# Patient Record
Sex: MALE | Race: BLACK OR AFRICAN AMERICAN | NOT HISPANIC OR LATINO | URBAN - METROPOLITAN AREA
[De-identification: names, ages, dates, MRNs, and addresses within clinical notes are randomized per-mention and may not be internally consistent; named-entity substitution may affect disease eponyms.]

---

## 2019-04-11 ENCOUNTER — INPATIENT (INPATIENT)
Facility: HOSPITAL | Age: 64
LOS: 11 days | Discharge: HOME | End: 2019-04-23
Attending: INTERNAL MEDICINE | Admitting: INTERNAL MEDICINE
Payer: MEDICARE

## 2019-04-11 VITALS
HEIGHT: 76 IN | HEART RATE: 110 BPM | SYSTOLIC BLOOD PRESSURE: 110 MMHG | WEIGHT: 169.98 LBS | TEMPERATURE: 98 F | OXYGEN SATURATION: 99 % | RESPIRATION RATE: 18 BRPM | DIASTOLIC BLOOD PRESSURE: 76 MMHG

## 2019-04-11 DIAGNOSIS — E11.9 TYPE 2 DIABETES MELLITUS WITHOUT COMPLICATIONS: ICD-10-CM

## 2019-04-11 DIAGNOSIS — M86.172 OTHER ACUTE OSTEOMYELITIS, LEFT ANKLE AND FOOT: ICD-10-CM

## 2019-04-11 LAB
ALBUMIN SERPL ELPH-MCNC: 2.8 G/DL — LOW (ref 3.3–5)
ALLERGY+IMMUNOLOGY DIAG STUDY NOTE: SIGNIFICANT CHANGE UP
ALP SERPL-CCNC: 99 U/L — SIGNIFICANT CHANGE UP (ref 40–120)
ALT FLD-CCNC: 12 U/L — SIGNIFICANT CHANGE UP (ref 12–78)
ANION GAP SERPL CALC-SCNC: 10 MMOL/L — SIGNIFICANT CHANGE UP (ref 5–17)
APTT BLD: 28.9 SEC — SIGNIFICANT CHANGE UP (ref 27.5–36.3)
AST SERPL-CCNC: 9 U/L — LOW (ref 15–37)
BASOPHILS # BLD AUTO: 0.02 K/UL — SIGNIFICANT CHANGE UP (ref 0–0.2)
BASOPHILS NFR BLD AUTO: 0.2 % — SIGNIFICANT CHANGE UP (ref 0–2)
BILIRUB SERPL-MCNC: 0.3 MG/DL — SIGNIFICANT CHANGE UP (ref 0.2–1.2)
BLD GP AB SCN SERPL QL: ABNORMAL
BUN SERPL-MCNC: 18 MG/DL — SIGNIFICANT CHANGE UP (ref 7–23)
CALCIUM SERPL-MCNC: 9.7 MG/DL — SIGNIFICANT CHANGE UP (ref 8.5–10.1)
CHLORIDE SERPL-SCNC: 101 MMOL/L — SIGNIFICANT CHANGE UP (ref 96–108)
CO2 SERPL-SCNC: 26 MMOL/L — SIGNIFICANT CHANGE UP (ref 22–31)
CREAT SERPL-MCNC: 1.05 MG/DL — SIGNIFICANT CHANGE UP (ref 0.5–1.3)
DIR ANTIGLOB POLYSPECIFIC INTERPRETATION: SIGNIFICANT CHANGE UP
EOSINOPHIL # BLD AUTO: 0.18 K/UL — SIGNIFICANT CHANGE UP (ref 0–0.5)
EOSINOPHIL NFR BLD AUTO: 1.8 % — SIGNIFICANT CHANGE UP (ref 0–6)
ERYTHROCYTE [SEDIMENTATION RATE] IN BLOOD: 100 MM/HR — HIGH (ref 0–20)
GLUCOSE SERPL-MCNC: 397 MG/DL — HIGH (ref 70–99)
HCT VFR BLD CALC: 34.6 % — LOW (ref 39–50)
HGB BLD-MCNC: 11.5 G/DL — LOW (ref 13–17)
IMM GRANULOCYTES NFR BLD AUTO: 0.3 % — SIGNIFICANT CHANGE UP (ref 0–1.5)
INR BLD: 0.98 RATIO — SIGNIFICANT CHANGE UP (ref 0.88–1.16)
LACTATE SERPL-SCNC: 1.2 MMOL/L — SIGNIFICANT CHANGE UP (ref 0.7–2)
LYMPHOCYTES # BLD AUTO: 1.81 K/UL — SIGNIFICANT CHANGE UP (ref 1–3.3)
LYMPHOCYTES # BLD AUTO: 18.6 % — SIGNIFICANT CHANGE UP (ref 13–44)
MCHC RBC-ENTMCNC: 31.2 PG — SIGNIFICANT CHANGE UP (ref 27–34)
MCHC RBC-ENTMCNC: 33.2 GM/DL — SIGNIFICANT CHANGE UP (ref 32–36)
MCV RBC AUTO: 93.8 FL — SIGNIFICANT CHANGE UP (ref 80–100)
MONOCYTES # BLD AUTO: 1.03 K/UL — HIGH (ref 0–0.9)
MONOCYTES NFR BLD AUTO: 10.6 % — SIGNIFICANT CHANGE UP (ref 2–14)
NEUTROPHILS # BLD AUTO: 6.67 K/UL — SIGNIFICANT CHANGE UP (ref 1.8–7.4)
NEUTROPHILS NFR BLD AUTO: 68.5 % — SIGNIFICANT CHANGE UP (ref 43–77)
NRBC # BLD: 0 /100 WBCS — SIGNIFICANT CHANGE UP (ref 0–0)
PLATELET # BLD AUTO: 220 K/UL — SIGNIFICANT CHANGE UP (ref 150–400)
POTASSIUM SERPL-MCNC: 4.4 MMOL/L — SIGNIFICANT CHANGE UP (ref 3.5–5.3)
POTASSIUM SERPL-SCNC: 4.4 MMOL/L — SIGNIFICANT CHANGE UP (ref 3.5–5.3)
PROT SERPL-MCNC: 8.3 GM/DL — SIGNIFICANT CHANGE UP (ref 6–8.3)
PROTHROM AB SERPL-ACNC: 11 SEC — SIGNIFICANT CHANGE UP (ref 10–12.9)
RBC # BLD: 3.69 M/UL — LOW (ref 4.2–5.8)
RBC # FLD: 13.2 % — SIGNIFICANT CHANGE UP (ref 10.3–14.5)
SODIUM SERPL-SCNC: 137 MMOL/L — SIGNIFICANT CHANGE UP (ref 135–145)
WBC # BLD: 9.74 K/UL — SIGNIFICANT CHANGE UP (ref 3.8–10.5)
WBC # FLD AUTO: 9.74 K/UL — SIGNIFICANT CHANGE UP (ref 3.8–10.5)

## 2019-04-11 PROCEDURE — 86077 PHYS BLOOD BANK SERV XMATCH: CPT

## 2019-04-11 PROCEDURE — 88304 TISSUE EXAM BY PATHOLOGIST: CPT | Mod: 26

## 2019-04-11 PROCEDURE — 99285 EMERGENCY DEPT VISIT HI MDM: CPT

## 2019-04-11 PROCEDURE — 99223 1ST HOSP IP/OBS HIGH 75: CPT

## 2019-04-11 PROCEDURE — 88309 TISSUE EXAM BY PATHOLOGIST: CPT | Mod: 26

## 2019-04-11 PROCEDURE — 73630 X-RAY EXAM OF FOOT: CPT | Mod: 26,LT

## 2019-04-11 PROCEDURE — 88311 DECALCIFY TISSUE: CPT | Mod: 26

## 2019-04-11 RX ORDER — PIPERACILLIN AND TAZOBACTAM 4; .5 G/20ML; G/20ML
3.38 INJECTION, POWDER, LYOPHILIZED, FOR SOLUTION INTRAVENOUS ONCE
Qty: 0 | Refills: 0 | Status: COMPLETED | OUTPATIENT
Start: 2019-04-11 | End: 2019-04-11

## 2019-04-11 RX ORDER — DEXTROSE 50 % IN WATER 50 %
25 SYRINGE (ML) INTRAVENOUS ONCE
Qty: 0 | Refills: 0 | Status: DISCONTINUED | OUTPATIENT
Start: 2019-04-11 | End: 2019-04-16

## 2019-04-11 RX ORDER — GABAPENTIN 400 MG/1
100 CAPSULE ORAL ONCE
Qty: 0 | Refills: 0 | Status: COMPLETED | OUTPATIENT
Start: 2019-04-11 | End: 2019-04-11

## 2019-04-11 RX ORDER — DEXTROSE 50 % IN WATER 50 %
12.5 SYRINGE (ML) INTRAVENOUS ONCE
Qty: 0 | Refills: 0 | Status: DISCONTINUED | OUTPATIENT
Start: 2019-04-11 | End: 2019-04-16

## 2019-04-11 RX ORDER — ACETAMINOPHEN 500 MG
650 TABLET ORAL EVERY 6 HOURS
Qty: 0 | Refills: 0 | Status: DISCONTINUED | OUTPATIENT
Start: 2019-04-11 | End: 2019-04-16

## 2019-04-11 RX ORDER — INSULIN GLARGINE 100 [IU]/ML
8 INJECTION, SOLUTION SUBCUTANEOUS AT BEDTIME
Qty: 0 | Refills: 0 | Status: DISCONTINUED | OUTPATIENT
Start: 2019-04-11 | End: 2019-04-12

## 2019-04-11 RX ORDER — SODIUM CHLORIDE 9 MG/ML
1000 INJECTION, SOLUTION INTRAVENOUS
Qty: 0 | Refills: 0 | Status: DISCONTINUED | OUTPATIENT
Start: 2019-04-11 | End: 2019-04-16

## 2019-04-11 RX ORDER — INSULIN LISPRO 100/ML
VIAL (ML) SUBCUTANEOUS
Qty: 0 | Refills: 0 | Status: DISCONTINUED | OUTPATIENT
Start: 2019-04-11 | End: 2019-04-16

## 2019-04-11 RX ORDER — GLUCAGON INJECTION, SOLUTION 0.5 MG/.1ML
1 INJECTION, SOLUTION SUBCUTANEOUS ONCE
Qty: 0 | Refills: 0 | Status: DISCONTINUED | OUTPATIENT
Start: 2019-04-11 | End: 2019-04-16

## 2019-04-11 RX ORDER — VANCOMYCIN HCL 1 G
1000 VIAL (EA) INTRAVENOUS ONCE
Qty: 0 | Refills: 0 | Status: COMPLETED | OUTPATIENT
Start: 2019-04-11 | End: 2019-04-11

## 2019-04-11 RX ORDER — INSULIN LISPRO 100/ML
VIAL (ML) SUBCUTANEOUS AT BEDTIME
Qty: 0 | Refills: 0 | Status: DISCONTINUED | OUTPATIENT
Start: 2019-04-11 | End: 2019-04-16

## 2019-04-11 RX ORDER — VANCOMYCIN HCL 1 G
1000 VIAL (EA) INTRAVENOUS EVERY 12 HOURS
Qty: 0 | Refills: 0 | Status: DISCONTINUED | OUTPATIENT
Start: 2019-04-12 | End: 2019-04-13

## 2019-04-11 RX ORDER — INSULIN HUMAN 100 [IU]/ML
8 INJECTION, SOLUTION SUBCUTANEOUS ONCE
Qty: 0 | Refills: 0 | Status: COMPLETED | OUTPATIENT
Start: 2019-04-11 | End: 2019-04-11

## 2019-04-11 RX ORDER — PIPERACILLIN AND TAZOBACTAM 4; .5 G/20ML; G/20ML
3.38 INJECTION, POWDER, LYOPHILIZED, FOR SOLUTION INTRAVENOUS EVERY 8 HOURS
Qty: 0 | Refills: 0 | Status: DISCONTINUED | OUTPATIENT
Start: 2019-04-11 | End: 2019-04-16

## 2019-04-11 RX ORDER — DEXTROSE 50 % IN WATER 50 %
15 SYRINGE (ML) INTRAVENOUS ONCE
Qty: 0 | Refills: 0 | Status: DISCONTINUED | OUTPATIENT
Start: 2019-04-11 | End: 2019-04-16

## 2019-04-11 RX ADMIN — PIPERACILLIN AND TAZOBACTAM 200 GRAM(S): 4; .5 INJECTION, POWDER, LYOPHILIZED, FOR SOLUTION INTRAVENOUS at 16:50

## 2019-04-11 RX ADMIN — INSULIN HUMAN 8 UNIT(S): 100 INJECTION, SOLUTION SUBCUTANEOUS at 23:05

## 2019-04-11 RX ADMIN — INSULIN HUMAN 8 UNIT(S): 100 INJECTION, SOLUTION SUBCUTANEOUS at 16:50

## 2019-04-11 RX ADMIN — Medication 8: at 23:04

## 2019-04-11 RX ADMIN — INSULIN GLARGINE 8 UNIT(S): 100 INJECTION, SOLUTION SUBCUTANEOUS at 23:04

## 2019-04-11 RX ADMIN — GABAPENTIN 100 MILLIGRAM(S): 400 CAPSULE ORAL at 23:04

## 2019-04-11 RX ADMIN — Medication 250 MILLIGRAM(S): at 17:48

## 2019-04-11 RX ADMIN — PIPERACILLIN AND TAZOBACTAM 3.38 GRAM(S): 4; .5 INJECTION, POWDER, LYOPHILIZED, FOR SOLUTION INTRAVENOUS at 17:50

## 2019-04-11 NOTE — ED PROVIDER NOTE - OBJECTIVE STATEMENT
64yoM; with pmh signif for DM, HTN, chronic left foot wound; now p/w worsening left foot wound with increasing drainage. sent to ED by podiatry for admission for osteomyelitis.  c/o chills. denies fever. denies n/v/d.   PMH; DM, HTN, chronic foot wound

## 2019-04-11 NOTE — ED PROVIDER NOTE - PHYSICAL EXAMINATION
General:     NAD, well-nourished, well-appearing  Head:     NC/AT, EOMI, oral mucosa moist  Neck:     trachea midline  Lungs:     CTA b/l, no w/r/r  CVS:     S1S2, RRR, no m/g/r  Abd:     +BS, s/nt/nd, no organomegaly  Ext:    2+ radial and pedal pulses, L FOOT:  per podiatry exam:  Wound #1: plantar 5th met head  Size: 2.3 x 3.2 cm   Neuro: AAOx3, no sensory/motor deficits

## 2019-04-11 NOTE — H&P ADULT - ASSESSMENT
64m with foot infection       IMPROVE VTE Individual Risk Assessment    RISK                                                          Points  [] Previous VTE                                           3  [] Thrombophilia                                        2  [] Lower limb paralysis                              2   [] Current Cancer                                       2   [] Immobilization > 24 hrs                        1  [] ICU/CCU stay > 24 hours                       1  [] Age > 60                                                   1    IMPROVE VTE Score:2

## 2019-04-11 NOTE — ED PROVIDER NOTE - CLINICAL SUMMARY MEDICAL DECISION MAKING FREE TEXT BOX
patient arrived c/o left foot wound, worsening, podiatry recommended admission for iv abx and OR tomorrow.

## 2019-04-11 NOTE — H&P ADULT - NSHPREVIEWOFSYSTEMS_GEN_ALL_CORE

## 2019-04-11 NOTE — ED PROVIDER NOTE - NS ED ROS FT
Constitutional: (-) fever  (-)chills  (-)sweats  Eyes/ENT: (-) blurry vision, (-) epistaxis  (-)rhinorrhea   (-) sore throat    Cardiovascular: (-) chest pain, (-) palpitations (-) edema   Respiratory: (-) cough, (-) shortness of breath   Gastrointestinal: (-)nausea  (-)vomiting, (-) diarrhea  (-) abdominal pain   :  (-)dysuria, (-)frequency, (-)urgency, (-)hematuria  Musculoskeletal: (-) neck pain, (-) back pain, (-) joint pain  Integumentary: (+)foot wound  Neurological: (-) headache, (-) altered mental status  (-)LOC

## 2019-04-11 NOTE — ED ADULT NURSE NOTE - OBJECTIVE STATEMENT
pt came in for foot pain. pt states he  came to ED after being seen by his podiatrist  pt came in for foot pain. left foot with diabetic ulcer and some gangrene.  pt states he  came to ED after being seen by his podiatrist

## 2019-04-11 NOTE — ED ADULT TRIAGE NOTE - HEIGHT IN CM
For information on Fall & Injury Prevention, visit [email or web address] [phone]

## 2019-04-11 NOTE — H&P ADULT - HISTORY OF PRESENT ILLNESS
HPI: 63 yo M w/ PMHx  of diabetes presents to the ED for multiple issues.  Describes pain in his left foot and leg which caused him to fall.  Now having pain in his lower back, fell 3 times since yesterday.  Has swelling, blistering and sores of the left foot which he says has been progressing over the last 8 days.  Pt states he saw a podiatrist today who sent him to the hospital.  Pt denies NVFC or SOB. 65 yo M w/ PMHx  of diabetes presents to the ED for multiple issues.  Describes pain in his left foot and leg which caused him to fall.  Now having pain in his lower back, fell 3 times since yesterday.  Has swelling, blistering and sores of the left foot which he says has been progressing over the last 8 days.  Pt states he saw a podiatrist today who sent him to the hospital.  Pt denies NVFC or SOB.

## 2019-04-11 NOTE — H&P ADULT - NSHPPHYSICALEXAM_GEN_ALL_CORE
ICU Vital Signs Last 24 Hrs  T(C): 36.7 (11 Apr 2019 11:30), Max: 36.7 (11 Apr 2019 11:30)  T(F): 98 (11 Apr 2019 11:30), Max: 98 (11 Apr 2019 11:30)  HR: 87 (11 Apr 2019 15:05) (87 - 110)  BP: 116/65 (11 Apr 2019 15:05) (110/76 - 116/65)  BP(mean): --  ABP: --  ABP(mean): --  RR: 18 (11 Apr 2019 15:05) (18 - 18)  SpO2: 100% (11 Apr 2019 15:05) (99% - 100%)  GENERAL: NAD well-developed  HEAD:  Atraumatic, Normocephalic  EYES: EOMI, PERRLA, conjunctiva and sclera clear  ENMT: No tonsillar erythema, exudates, or enlargement; Moist mucous membranes, Good dentition, No lesions  NECK: Supple, No JVD, Normal thyroid  NERVOUS SYSTEM:  Alert & Oriented X3, Good concentration; Motor Strength 5/5 B/L upper and lower extremities; DTRs 2+ intact and symmetric  CHEST/LUNG: Clear to percussion bilaterally; No rales, rhonchi, wheezing, or rubs  HEART: Regular rate and rhythm; No murmurs, rubs, or gallops  ABDOMEN: Soft, Nontender, Nondistended; Bowel sounds present  EXTREMITIES:  2+ Peripheral Pulses, No clubbing, cyanosis, or edema  LYMPH: No lymphadenopathy   SKIN: No rashes or lesions ICU Vital Signs Last 24 Hrs  T(C): 36.7 (11 Apr 2019 11:30), Max: 36.7 (11 Apr 2019 11:30)  T(F): 98 (11 Apr 2019 11:30), Max: 98 (11 Apr 2019 11:30)  HR: 87 (11 Apr 2019 15:05) (87 - 110)  BP: 116/65 (11 Apr 2019 15:05) (110/76 - 116/65)  BP(mean): --  ABP: --  ABP(mean): --  RR: 18 (11 Apr 2019 15:05) (18 - 18)  SpO2: 100% (11 Apr 2019 15:05) (99% - 100%)  GENERAL: NAD well-developed  HEAD:  Atraumatic, Normocephalic  EYES: EOMI, PERRLA, conjunctiva and sclera clear  ENMT: No tonsillar erythema, exudates, or enlargement; Moist mucous membranes, Good dentition, No lesions  NECK: Supple, No JVD, Normal thyroid  NERVOUS SYSTEM:  Alert & Oriented X3, Good concentration; Motor Strength 5/5 B/L upper and lower extremities; DTRs 2+ intact and symmetric  CHEST/LUNG: Clear to percussion bilaterally; No rales, rhonchi, wheezing, or rubs  HEART: Regular rate and rhythm; No murmurs, rubs, or gallops  ABDOMEN: Soft, Nontender, Nondistended; Bowel sounds present  EXTREMITIES:  2+ Peripheral Pulses, No clubbing, cyanosis, or edema POSITIVE left foot baldomero left boot   LYMPH: No lymphadenopathy   SKIN: No rashes or lesions

## 2019-04-12 DIAGNOSIS — E11.628 TYPE 2 DIABETES MELLITUS WITH OTHER SKIN COMPLICATIONS: ICD-10-CM

## 2019-04-12 LAB
ANION GAP SERPL CALC-SCNC: 8 MMOL/L — SIGNIFICANT CHANGE UP (ref 5–17)
BUN SERPL-MCNC: 17 MG/DL — SIGNIFICANT CHANGE UP (ref 7–23)
CALCIUM SERPL-MCNC: 8.9 MG/DL — SIGNIFICANT CHANGE UP (ref 8.5–10.1)
CHLORIDE SERPL-SCNC: 103 MMOL/L — SIGNIFICANT CHANGE UP (ref 96–108)
CO2 SERPL-SCNC: 26 MMOL/L — SIGNIFICANT CHANGE UP (ref 22–31)
CREAT SERPL-MCNC: 0.86 MG/DL — SIGNIFICANT CHANGE UP (ref 0.5–1.3)
CRP SERPL-MCNC: 14.28 MG/DL — HIGH (ref 0–0.4)
GLUCOSE SERPL-MCNC: 308 MG/DL — HIGH (ref 70–99)
HBA1C BLD-MCNC: >15.5 % — HIGH (ref 4–5.6)
HCT VFR BLD CALC: 32.5 % — LOW (ref 39–50)
HCV AB S/CO SERPL IA: 0.07 S/CO — SIGNIFICANT CHANGE UP (ref 0–0.99)
HCV AB SERPL-IMP: SIGNIFICANT CHANGE UP
HGB BLD-MCNC: 10.4 G/DL — LOW (ref 13–17)
MCHC RBC-ENTMCNC: 30.2 PG — SIGNIFICANT CHANGE UP (ref 27–34)
MCHC RBC-ENTMCNC: 32 GM/DL — SIGNIFICANT CHANGE UP (ref 32–36)
MCV RBC AUTO: 94.5 FL — SIGNIFICANT CHANGE UP (ref 80–100)
NRBC # BLD: 0 /100 WBCS — SIGNIFICANT CHANGE UP (ref 0–0)
PLATELET # BLD AUTO: 205 K/UL — SIGNIFICANT CHANGE UP (ref 150–400)
POTASSIUM SERPL-MCNC: 3.8 MMOL/L — SIGNIFICANT CHANGE UP (ref 3.5–5.3)
POTASSIUM SERPL-SCNC: 3.8 MMOL/L — SIGNIFICANT CHANGE UP (ref 3.5–5.3)
RBC # BLD: 3.44 M/UL — LOW (ref 4.2–5.8)
RBC # FLD: 13.2 % — SIGNIFICANT CHANGE UP (ref 10.3–14.5)
SODIUM SERPL-SCNC: 137 MMOL/L — SIGNIFICANT CHANGE UP (ref 135–145)
WBC # BLD: 6.68 K/UL — SIGNIFICANT CHANGE UP (ref 3.8–10.5)
WBC # FLD AUTO: 6.68 K/UL — SIGNIFICANT CHANGE UP (ref 3.8–10.5)

## 2019-04-12 PROCEDURE — 99223 1ST HOSP IP/OBS HIGH 75: CPT

## 2019-04-12 PROCEDURE — 99232 SBSQ HOSP IP/OBS MODERATE 35: CPT

## 2019-04-12 RX ORDER — INSULIN GLARGINE 100 [IU]/ML
10 INJECTION, SOLUTION SUBCUTANEOUS
Qty: 0 | Refills: 0 | Status: DISCONTINUED | OUTPATIENT
Start: 2019-04-12 | End: 2019-04-12

## 2019-04-12 RX ORDER — INSULIN GLARGINE 100 [IU]/ML
20 INJECTION, SOLUTION SUBCUTANEOUS
Qty: 0 | Refills: 0 | Status: DISCONTINUED | OUTPATIENT
Start: 2019-04-12 | End: 2019-04-13

## 2019-04-12 RX ADMIN — Medication 6: at 22:35

## 2019-04-12 RX ADMIN — PIPERACILLIN AND TAZOBACTAM 25 GRAM(S): 4; .5 INJECTION, POWDER, LYOPHILIZED, FOR SOLUTION INTRAVENOUS at 16:18

## 2019-04-12 RX ADMIN — INSULIN GLARGINE 20 UNIT(S): 100 INJECTION, SOLUTION SUBCUTANEOUS at 22:34

## 2019-04-12 RX ADMIN — PIPERACILLIN AND TAZOBACTAM 25 GRAM(S): 4; .5 INJECTION, POWDER, LYOPHILIZED, FOR SOLUTION INTRAVENOUS at 08:38

## 2019-04-12 RX ADMIN — Medication 6: at 16:18

## 2019-04-12 RX ADMIN — Medication 250 MILLIGRAM(S): at 06:05

## 2019-04-12 RX ADMIN — Medication 250 MILLIGRAM(S): at 18:56

## 2019-04-12 RX ADMIN — PIPERACILLIN AND TAZOBACTAM 25 GRAM(S): 4; .5 INJECTION, POWDER, LYOPHILIZED, FOR SOLUTION INTRAVENOUS at 01:14

## 2019-04-12 RX ADMIN — Medication 4: at 13:00

## 2019-04-12 RX ADMIN — Medication 8: at 07:51

## 2019-04-12 NOTE — PROGRESS NOTE ADULT - ASSESSMENT
65 yo M w/ PMHx of diabetes presents to the ED complaint of left foot pain.  Describes pain in his left foot and leg which caused him to fall.  Initially complained of back pain in his lower back, but now feels better.  Has swelling, blistering and sores on the left foot which he says has been progressing over the last few months.  Pt states he saw a podiatrist who sent him to the hospital.  Denies fever or chills.  He does admit to stopping his insulin a few months ago and has not been on any diabetic medications since.    Left foot pain, likely Osteomyelitis  New 4 mm linear radiopaque foreign body in the soft tissue between the   second and third proximal phalanges in the plantar aspect  Podiatry following  ID consult pending  On IV Vanco and Zosyn  follow culture results  local wound care    IDDM  Increase Lantus to 20 units BID (patient reports taking Solostar 30 units BID at home)  SSI coverage  diabetic diet  Check HgbA1C

## 2019-04-12 NOTE — PROGRESS NOTE ADULT - ASSESSMENT
65 yo DM w/ left foot abscess, and high suspicion for OM  - 3 cc of purulent drainage expressed  - Wound flushed and cultured  - Flushed again with copious saline  - Wound plantarly and dorsally w/ aquacel Ag  - MRI ordered to r/o OM  - added on for partial 5th ray amp on monday after 2 pm  - f/u ID recs  - D/w attending

## 2019-04-12 NOTE — CONSULT NOTE ADULT - SUBJECTIVE AND OBJECTIVE BOX
Patient is a 64y old  Male who presents with a chief complaint of left foot infection    HPI: 65 yo M w/ PMHx  of diabetes presents to the ED for multiple issues.  Describes pain in his left foot and leg which caused him to fall.  Now having pain in his lower back, fell 3 times since yesterday.  Has swelling, blistering and sores of the left foot which he says has been progressing over the last 8 days.  Pt states he saw a podiatrist today who sent him to the hospital.  Pt denies NVFC or SOB.      PAST MEDICAL & SURGICAL HISTORY:  Diabetes  No significant past surgical history      MEDICATIONS  (STANDING):  insulin regular  human recombinant. 8 Unit(s) SubCutaneous once  piperacillin/tazobactam IVPB. 3.375 Gram(s) IV Intermittent once  vancomycin  IVPB 1000 milliGRAM(s) IV Intermittent once    MEDICATIONS  (PRN):      Allergies    bananas (Rash)  Cauliflower (Other)  No Known Drug Allergies    Intolerances        VITALS:    Vital Signs Last 24 Hrs  T(C): 36.7 (11 Apr 2019 11:30), Max: 36.7 (11 Apr 2019 11:30)  T(F): 98 (11 Apr 2019 11:30), Max: 98 (11 Apr 2019 11:30)  HR: 87 (11 Apr 2019 15:05) (87 - 110)  BP: 116/65 (11 Apr 2019 15:05) (110/76 - 116/65)  BP(mean): --  RR: 18 (11 Apr 2019 15:05) (18 - 18)  SpO2: 100% (11 Apr 2019 15:05) (99% - 100%)    LABS:                          11.5   9.74  )-----------( 220      ( 11 Apr 2019 15:40 )             34.6       04-11    137  |  101  |  18  ----------------------------<  397<H>  4.4   |  26  |  1.05    Ca    9.7      11 Apr 2019 15:40    TPro  8.3  /  Alb  2.8<L>  /  TBili  0.3  /  DBili  x   /  AST  9<L>  /  ALT  12  /  AlkPhos  99  04-11      CAPILLARY BLOOD GLUCOSE      POCT Blood Glucose.: 371 mg/dL (11 Apr 2019 16:24)  POCT Blood Glucose.: 357 mg/dL (11 Apr 2019 16:22)      PT/INR - ( 11 Apr 2019 15:40 )   PT: 11.0 sec;   INR: 0.98 ratio         PTT - ( 11 Apr 2019 15:40 )  PTT:28.9 sec    LOWER EXTREMITY PHYSICAL EXAM:    Vascular: DP/PT 2/4, B/L, CFT <3 seconds B/L, Temperature gradient warm,, B/L. (L dorsolateral foot warmer)  Neuro: Epicritic sensation diminished to b/l feet  Musculoskeletal/Ortho: no gross deformtiies  Skin:  Wound #1: plantar 5th met head  Size: 2.3 x 3.2 cm   Depth: 1.8 cm (to the dorsal foot) also probes to bone  Wound bed: fibrotic and liquefactive  Drainage: purulent  Odor: yes  Periwound: hyperkeratotic  Etiology: pressure    RADIOLOGY & ADDITIONAL STUDIES:  < from: Xray Foot AP + Lateral + Oblique, Left (04.11.19 @ 14:51) >  EXAM:  FOOT COMPLETE  3 VWS   LT                            PROCEDURE DATE:  04/11/2019          INTERPRETATION:  Left foot x-rays    Indication: Left foot wound.    3 views of the left foot are compared to previous admission dated   7/6/2016.    Impression: No evidence for an acute fracture or dislocation.    The joint spaces are preserved.    The osseous mineralization is within normal limits.    Stable calcaneal spur.    New 4 mm linear radiopaque foreign body in the soft tissue between the   second and third proximal phalanges in the plantar aspect. This finding   is communicated with the emergency department via the PACS communication   system.    < end of copied text >
Infectious Diseases - Attending at Dr. Chan    HPI:  63 yo M w/ PMHx  of diabetes presents to the ED for multiple issues.  Describes pain in his left foot and leg which caused him to fall.  Now having pain in his lower back, fell 3 times since yesterday.  Has swelling, blistering and sores of the left foot which he says has been progressing over the last 8 days.  Pt states he saw a podiatrist today who sent him to the hospital.  Pt denies NVFC or SOB. (11 Apr 2019 17:44)      PAST MEDICAL & SURGICAL HISTORY:  Diabetes  No significant past surgical history      Allergies    bananas (Rash)  Cauliflower (Other)  No Known Drug Allergies    Intolerances        FAMILY HISTORY:  No pertinent family history in first degree relatives      SOCIAL HISTORY: smoker    REVIEW OF SYSTEMS:    Constitutional: No fever, weight loss or fatigue  Eyes: No eye pain, visual disturbances, or discharge  ENT:  No difficulty hearing, tinnitus, vertigo; No sinus or throat pain  Neck: No pain or stiffness  Respiratory: No cough, wheezing, chills or hemoptysis  Cardiovascular: No chest pain, palpitations, shortness of breath, dizziness or leg swelling  Gastrointestinal: No abdominal or epigastric pain. No nausea, vomiting or hematemesis; No diarrhea or constipation. No melena or hematochezia.  Genitourinary: No dysuria, frequency, hematuria or incontinence  Neurological: No headaches, memory loss, loss of strength, numbness or tremors  Skin: No itching, burning, rashes or lesions       MEDICATIONS  (STANDING):  dextrose 5%. 1000 milliLiter(s) (50 mL/Hr) IV Continuous <Continuous>  dextrose 50% Injectable 12.5 Gram(s) IV Push once  dextrose 50% Injectable 25 Gram(s) IV Push once  dextrose 50% Injectable 25 Gram(s) IV Push once  insulin glargine Injectable (LANTUS) 20 Unit(s) SubCutaneous two times a day  insulin lispro (HumaLOG) corrective regimen sliding scale   SubCutaneous three times a day before meals  insulin lispro (HumaLOG) corrective regimen sliding scale   SubCutaneous at bedtime  piperacillin/tazobactam IVPB. 3.375 Gram(s) IV Intermittent every 8 hours  vancomycin  IVPB 1000 milliGRAM(s) IV Intermittent every 12 hours    MEDICATIONS  (PRN):  acetaminophen   Tablet .. 650 milliGRAM(s) Oral every 6 hours PRN Mild Pain (1 - 3)  dextrose 40% Gel 15 Gram(s) Oral once PRN Blood Glucose LESS THAN 70 milliGRAM(s)/deciliter  glucagon  Injectable 1 milliGRAM(s) IntraMuscular once PRN Glucose LESS THAN 70 milligrams/deciliter      Vital Signs Last 24 Hrs  T(C): 36.7 (12 Apr 2019 12:09), Max: 36.7 (12 Apr 2019 00:06)  T(F): 98 (12 Apr 2019 12:09), Max: 98.1 (12 Apr 2019 00:06)  HR: 65 (12 Apr 2019 12:09) (65 - 94)  BP: 101/53 (12 Apr 2019 12:09) (97/52 - 108/64)  BP(mean): --  RR: 16 (12 Apr 2019 12:09) (16 - 17)  SpO2: 100% (12 Apr 2019 12:09) (98% - 100%)    PHYSICAL EXAM:    Constitutional: NAD, thin built  HEENT: PERRLA, EOMI, Normal Hearing, MMM  Neck: No LAD, No JVD  Back: Normal spine flexure, No CVA tenderness  Respiratory: CTAB/L  Cardiovascular: S1 and S2, RRR, no M/G/R  Gastrointestinal: BS+, soft, NT/ND  Extremities: No peripheral edema  Vascular: 2+ peripheral pulses  Neurological: A/O x 3, no focal deficits  Skin: left foot wound near fifth metatarsal   foot swelling      LABS:                        10.4   6.68  )-----------( 205      ( 12 Apr 2019 07:58 )             32.5     04-12    137  |  103  |  17  ----------------------------<  308<H>  3.8   |  26  |  0.86    Ca    8.9      12 Apr 2019 07:58    TPro  8.3  /  Alb  2.8<L>  /  TBili  0.3  /  DBili  x   /  AST  9<L>  /  ALT  12  /  AlkPhos  99  04-11    PT/INR - ( 11 Apr 2019 15:40 )   PT: 11.0 sec;   INR: 0.98 ratio         PTT - ( 11 Apr 2019 15:40 )  PTT:28.9 sec      MICROBIOLOGY:  RECENT CULTURES:        RADIOLOGY & ADDITIONAL STUDIES:    INTERPRETATION:  Left foot x-rays    Indication: Left foot wound.    3 views of the left foot are compared to previous admission dated   7/6/2016.    Impression: No evidence for an acute fracture or dislocation.    The joint spaces are preserved.    The osseous mineralization is within normal limits.    Stable calcaneal spur.    New 4 mm linear radiopaque foreign body in the soft tissue between the   second and third proximal phalanges in the plantar aspect. This finding   is communicated with the emergency department via the PACS communication   system.

## 2019-04-12 NOTE — CONSULT NOTE ADULT - ASSESSMENT
63 yo DM w/ left foot abscess, and high suspicion for OM  - #15 blade used to make a dorsal incision over 5th metatarsal shaft, incision communicates to the plantar wound  - 5 cc of purulent drainage expressed  - Wound flushed and cultured  - Flushed again with copious saline  - Wound packed dorsally  - XR finding of new foreign body does not correlate clinically  - Rec IV vanco, zosyn  - Rec admit for IV abx and workup for osteomyelitis  - ESR and CRP ordered  - D/w attending
64 yr old male seen with

## 2019-04-13 LAB
ANION GAP SERPL CALC-SCNC: 8 MMOL/L — SIGNIFICANT CHANGE UP (ref 5–17)
BUN SERPL-MCNC: 25 MG/DL — HIGH (ref 7–23)
CALCIUM SERPL-MCNC: 9.4 MG/DL — SIGNIFICANT CHANGE UP (ref 8.5–10.1)
CHLORIDE SERPL-SCNC: 104 MMOL/L — SIGNIFICANT CHANGE UP (ref 96–108)
CO2 SERPL-SCNC: 26 MMOL/L — SIGNIFICANT CHANGE UP (ref 22–31)
CREAT SERPL-MCNC: 1.01 MG/DL — SIGNIFICANT CHANGE UP (ref 0.5–1.3)
GLUCOSE BLDC GLUCOMTR-MCNC: 215 MG/DL — HIGH (ref 70–99)
GLUCOSE BLDC GLUCOMTR-MCNC: 245 MG/DL — HIGH (ref 70–99)
GLUCOSE BLDC GLUCOMTR-MCNC: 316 MG/DL — HIGH (ref 70–99)
GLUCOSE SERPL-MCNC: 328 MG/DL — HIGH (ref 70–99)
HCT VFR BLD CALC: 35.4 % — LOW (ref 39–50)
HGB BLD-MCNC: 11.2 G/DL — LOW (ref 13–17)
MCHC RBC-ENTMCNC: 30.2 PG — SIGNIFICANT CHANGE UP (ref 27–34)
MCHC RBC-ENTMCNC: 31.6 GM/DL — LOW (ref 32–36)
MCV RBC AUTO: 95.4 FL — SIGNIFICANT CHANGE UP (ref 80–100)
NRBC # BLD: 0 /100 WBCS — SIGNIFICANT CHANGE UP (ref 0–0)
PLATELET # BLD AUTO: 232 K/UL — SIGNIFICANT CHANGE UP (ref 150–400)
POTASSIUM SERPL-MCNC: 4.2 MMOL/L — SIGNIFICANT CHANGE UP (ref 3.5–5.3)
POTASSIUM SERPL-SCNC: 4.2 MMOL/L — SIGNIFICANT CHANGE UP (ref 3.5–5.3)
RBC # BLD: 3.71 M/UL — LOW (ref 4.2–5.8)
RBC # FLD: 13.2 % — SIGNIFICANT CHANGE UP (ref 10.3–14.5)
SODIUM SERPL-SCNC: 138 MMOL/L — SIGNIFICANT CHANGE UP (ref 135–145)
VANCOMYCIN TROUGH SERPL-MCNC: 8 UG/ML — LOW (ref 10–20)
WBC # BLD: 6.1 K/UL — SIGNIFICANT CHANGE UP (ref 3.8–10.5)
WBC # FLD AUTO: 6.1 K/UL — SIGNIFICANT CHANGE UP (ref 3.8–10.5)

## 2019-04-13 PROCEDURE — 93923 UPR/LXTR ART STDY 3+ LVLS: CPT | Mod: 26

## 2019-04-13 PROCEDURE — 99233 SBSQ HOSP IP/OBS HIGH 50: CPT

## 2019-04-13 RX ORDER — INSULIN GLARGINE 100 [IU]/ML
30 INJECTION, SOLUTION SUBCUTANEOUS
Qty: 0 | Refills: 0 | Status: DISCONTINUED | OUTPATIENT
Start: 2019-04-13 | End: 2019-04-16

## 2019-04-13 RX ORDER — VANCOMYCIN HCL 1 G
1000 VIAL (EA) INTRAVENOUS EVERY 8 HOURS
Qty: 0 | Refills: 0 | Status: DISCONTINUED | OUTPATIENT
Start: 2019-04-13 | End: 2019-04-16

## 2019-04-13 RX ORDER — INSULIN LISPRO 100/ML
5 VIAL (ML) SUBCUTANEOUS
Qty: 0 | Refills: 0 | Status: DISCONTINUED | OUTPATIENT
Start: 2019-04-13 | End: 2019-04-16

## 2019-04-13 RX ADMIN — PIPERACILLIN AND TAZOBACTAM 25 GRAM(S): 4; .5 INJECTION, POWDER, LYOPHILIZED, FOR SOLUTION INTRAVENOUS at 09:00

## 2019-04-13 RX ADMIN — Medication 5 UNIT(S): at 12:25

## 2019-04-13 RX ADMIN — INSULIN GLARGINE 20 UNIT(S): 100 INJECTION, SOLUTION SUBCUTANEOUS at 07:48

## 2019-04-13 RX ADMIN — Medication 4: at 21:37

## 2019-04-13 RX ADMIN — Medication 4: at 16:24

## 2019-04-13 RX ADMIN — Medication 250 MILLIGRAM(S): at 21:36

## 2019-04-13 RX ADMIN — Medication 250 MILLIGRAM(S): at 07:10

## 2019-04-13 RX ADMIN — PIPERACILLIN AND TAZOBACTAM 25 GRAM(S): 4; .5 INJECTION, POWDER, LYOPHILIZED, FOR SOLUTION INTRAVENOUS at 18:04

## 2019-04-13 RX ADMIN — Medication 4: at 12:24

## 2019-04-13 RX ADMIN — Medication 10: at 07:45

## 2019-04-13 RX ADMIN — PIPERACILLIN AND TAZOBACTAM 25 GRAM(S): 4; .5 INJECTION, POWDER, LYOPHILIZED, FOR SOLUTION INTRAVENOUS at 01:43

## 2019-04-13 RX ADMIN — INSULIN GLARGINE 30 UNIT(S): 100 INJECTION, SOLUTION SUBCUTANEOUS at 21:37

## 2019-04-13 RX ADMIN — Medication 5 UNIT(S): at 16:24

## 2019-04-13 NOTE — PROGRESS NOTE ADULT - ASSESSMENT
63 yo DM w/ left foot abscess, and high suspicion for OM  - No purulence drained today  - Flushed again with copious saline  - Wound packed plantarly  - added on for partial 5th ray amp on monday after 2 pm  - Awaiting MRI final read for final pod plan  - f/u ID recs  - seen with attending

## 2019-04-13 NOTE — PROGRESS NOTE ADULT - ASSESSMENT
65 yo M w/ PMHx of diabetes presents to the ED complaint of left foot pain.  Describes pain in his left foot and leg which caused him to fall.  Initially complained of back pain in his lower back, but now feels better.  Has swelling, blistering and sores on the left foot which he says has been progressing over the last few months.  Pt states he saw a podiatrist who sent him to the hospital.  Denies fever or chills.  He does admit to stopping his insulin a few months ago and has not been on any diabetic medications since.    Left foot pain, likely Osteomyelitis  New 4 mm linear radiopaque foreign body in the soft tissue between the   second and third proximal phalanges in the plantar aspect  Podiatry following. plans for partial 5 ray amputation on monday   ID consult appreciated   On IV Vanco and Zosyn  vanco trough low and will change to 1g q8  follow culture results  local wound care    IDDM  Increase Lantus to 30 units BID (patient reports taking Solostar 30 units BID at home) also take 15 units of premeal   add 5 u premeal covergae   SSI coverage  diabetic diet   HgbA1C >15

## 2019-04-14 LAB
ANION GAP SERPL CALC-SCNC: 8 MMOL/L — SIGNIFICANT CHANGE UP (ref 5–17)
BUN SERPL-MCNC: 25 MG/DL — HIGH (ref 7–23)
CALCIUM SERPL-MCNC: 9.2 MG/DL — SIGNIFICANT CHANGE UP (ref 8.5–10.1)
CHLORIDE SERPL-SCNC: 107 MMOL/L — SIGNIFICANT CHANGE UP (ref 96–108)
CO2 SERPL-SCNC: 25 MMOL/L — SIGNIFICANT CHANGE UP (ref 22–31)
CREAT SERPL-MCNC: 0.89 MG/DL — SIGNIFICANT CHANGE UP (ref 0.5–1.3)
GLUCOSE BLDC GLUCOMTR-MCNC: 141 MG/DL — HIGH (ref 70–99)
GLUCOSE BLDC GLUCOMTR-MCNC: 212 MG/DL — HIGH (ref 70–99)
GLUCOSE BLDC GLUCOMTR-MCNC: 263 MG/DL — HIGH (ref 70–99)
GLUCOSE BLDC GLUCOMTR-MCNC: 274 MG/DL — HIGH (ref 70–99)
GLUCOSE SERPL-MCNC: 215 MG/DL — HIGH (ref 70–99)
HCT VFR BLD CALC: 34.3 % — LOW (ref 39–50)
HGB BLD-MCNC: 10.8 G/DL — LOW (ref 13–17)
MCHC RBC-ENTMCNC: 30.1 PG — SIGNIFICANT CHANGE UP (ref 27–34)
MCHC RBC-ENTMCNC: 31.5 GM/DL — LOW (ref 32–36)
MCV RBC AUTO: 95.5 FL — SIGNIFICANT CHANGE UP (ref 80–100)
NRBC # BLD: 0 /100 WBCS — SIGNIFICANT CHANGE UP (ref 0–0)
PLATELET # BLD AUTO: 246 K/UL — SIGNIFICANT CHANGE UP (ref 150–400)
POTASSIUM SERPL-MCNC: 4.1 MMOL/L — SIGNIFICANT CHANGE UP (ref 3.5–5.3)
POTASSIUM SERPL-SCNC: 4.1 MMOL/L — SIGNIFICANT CHANGE UP (ref 3.5–5.3)
RBC # BLD: 3.59 M/UL — LOW (ref 4.2–5.8)
RBC # FLD: 13.2 % — SIGNIFICANT CHANGE UP (ref 10.3–14.5)
SODIUM SERPL-SCNC: 140 MMOL/L — SIGNIFICANT CHANGE UP (ref 135–145)
VANCOMYCIN TROUGH SERPL-MCNC: 15.6 UG/ML — SIGNIFICANT CHANGE UP (ref 10–20)
WBC # BLD: 5.39 K/UL — SIGNIFICANT CHANGE UP (ref 3.8–10.5)
WBC # FLD AUTO: 5.39 K/UL — SIGNIFICANT CHANGE UP (ref 3.8–10.5)

## 2019-04-14 PROCEDURE — 99233 SBSQ HOSP IP/OBS HIGH 50: CPT

## 2019-04-14 RX ADMIN — PIPERACILLIN AND TAZOBACTAM 25 GRAM(S): 4; .5 INJECTION, POWDER, LYOPHILIZED, FOR SOLUTION INTRAVENOUS at 17:28

## 2019-04-14 RX ADMIN — Medication 6: at 07:47

## 2019-04-14 RX ADMIN — PIPERACILLIN AND TAZOBACTAM 25 GRAM(S): 4; .5 INJECTION, POWDER, LYOPHILIZED, FOR SOLUTION INTRAVENOUS at 02:28

## 2019-04-14 RX ADMIN — Medication 250 MILLIGRAM(S): at 22:41

## 2019-04-14 RX ADMIN — Medication 6: at 16:11

## 2019-04-14 RX ADMIN — Medication 5 UNIT(S): at 16:12

## 2019-04-14 RX ADMIN — INSULIN GLARGINE 30 UNIT(S): 100 INJECTION, SOLUTION SUBCUTANEOUS at 23:00

## 2019-04-14 RX ADMIN — Medication 250 MILLIGRAM(S): at 06:22

## 2019-04-14 RX ADMIN — Medication 5 UNIT(S): at 11:14

## 2019-04-14 RX ADMIN — Medication 5 UNIT(S): at 07:47

## 2019-04-14 RX ADMIN — Medication 250 MILLIGRAM(S): at 13:49

## 2019-04-14 RX ADMIN — PIPERACILLIN AND TAZOBACTAM 25 GRAM(S): 4; .5 INJECTION, POWDER, LYOPHILIZED, FOR SOLUTION INTRAVENOUS at 09:59

## 2019-04-14 RX ADMIN — INSULIN GLARGINE 30 UNIT(S): 100 INJECTION, SOLUTION SUBCUTANEOUS at 07:49

## 2019-04-14 NOTE — PROGRESS NOTE ADULT - ASSESSMENT
63 yo M w/ PMHx of diabetes presents to the ED complaint of left foot pain.  Describes pain in his left foot and leg which caused him to fall.  Initially complained of back pain in his lower back, but now feels better.  Has swelling, blistering and sores on the left foot which he says has been progressing over the last few months.  Pt states he saw a podiatrist who sent him to the hospital.  Denies fever or chills.  He does admit to stopping his insulin a few months ago and has not been on any diabetic medications since.    Left foot pain, likely Osteomyelitis  New 4 mm linear radiopaque foreign body in the soft tissue between the   second and third proximal phalanges in the plantar aspect  Could not get MRI d/t foreign body   Podiatry following. plans for partial 5 ray amputation on monday   ID consult appreciated   On IV Vanco and Zosyn  vanco trough low and changed to 1g q8  follow culture results  local wound care    IDDM  Increase Lantus to 30 units BID (patient reports taking Solostar 30 units BID at home) also take 15 units of premeal   add 5 u premeal covergae   slowly improving   SSI coverage  needs follow up with endo on dc   diabetic diet   HgbA1C >15

## 2019-04-14 NOTE — PROGRESS NOTE ADULT - ASSESSMENT
63 yo DM w/ left foot abscess, and high suspicion for OM  - No purulence drained today  - Flushed again with copious saline  - Wound packed plantarly  - Pt unable to get MRI 2/2 foreign body in 2nd toe  - Clinical and lab results all indicative of osteomyelitis of 5th ray  - Pt scheduled for 8:15 am tomorrow morning, partial 5th ray resection  - NPO after midnight

## 2019-04-15 ENCOUNTER — TRANSCRIPTION ENCOUNTER (OUTPATIENT)
Age: 64
End: 2019-04-15

## 2019-04-15 LAB — GLUCOSE BLDC GLUCOMTR-MCNC: 199 MG/DL — HIGH (ref 70–99)

## 2019-04-15 PROCEDURE — 99232 SBSQ HOSP IP/OBS MODERATE 35: CPT

## 2019-04-15 PROCEDURE — 99233 SBSQ HOSP IP/OBS HIGH 50: CPT

## 2019-04-15 RX ORDER — SODIUM CHLORIDE 9 MG/ML
1000 INJECTION, SOLUTION INTRAVENOUS
Qty: 0 | Refills: 0 | Status: DISCONTINUED | OUTPATIENT
Start: 2019-04-15 | End: 2019-04-15

## 2019-04-15 RX ORDER — LACTOBACILLUS ACIDOPHILUS 100MM CELL
1 CAPSULE ORAL THREE TIMES A DAY
Qty: 0 | Refills: 0 | Status: DISCONTINUED | OUTPATIENT
Start: 2019-04-15 | End: 2019-04-16

## 2019-04-15 RX ORDER — SODIUM CHLORIDE 9 MG/ML
1000 INJECTION INTRAMUSCULAR; INTRAVENOUS; SUBCUTANEOUS
Qty: 0 | Refills: 0 | Status: DISCONTINUED | OUTPATIENT
Start: 2019-04-15 | End: 2019-04-16

## 2019-04-15 RX ORDER — INSULIN GLARGINE 100 [IU]/ML
15 INJECTION, SOLUTION SUBCUTANEOUS ONCE
Qty: 0 | Refills: 0 | Status: COMPLETED | OUTPATIENT
Start: 2019-04-15 | End: 2019-04-15

## 2019-04-15 RX ORDER — SODIUM CHLORIDE 9 MG/ML
1000 INJECTION, SOLUTION INTRAVENOUS
Qty: 0 | Refills: 0 | Status: DISCONTINUED | OUTPATIENT
Start: 2019-04-16 | End: 2019-04-16

## 2019-04-15 RX ADMIN — Medication 1 TABLET(S): at 21:50

## 2019-04-15 RX ADMIN — INSULIN GLARGINE 30 UNIT(S): 100 INJECTION, SOLUTION SUBCUTANEOUS at 21:51

## 2019-04-15 RX ADMIN — Medication 2: at 21:49

## 2019-04-15 RX ADMIN — PIPERACILLIN AND TAZOBACTAM 25 GRAM(S): 4; .5 INJECTION, POWDER, LYOPHILIZED, FOR SOLUTION INTRAVENOUS at 08:21

## 2019-04-15 RX ADMIN — Medication 5 UNIT(S): at 15:49

## 2019-04-15 RX ADMIN — Medication 2: at 08:19

## 2019-04-15 RX ADMIN — PIPERACILLIN AND TAZOBACTAM 25 GRAM(S): 4; .5 INJECTION, POWDER, LYOPHILIZED, FOR SOLUTION INTRAVENOUS at 01:38

## 2019-04-15 RX ADMIN — PIPERACILLIN AND TAZOBACTAM 25 GRAM(S): 4; .5 INJECTION, POWDER, LYOPHILIZED, FOR SOLUTION INTRAVENOUS at 18:09

## 2019-04-15 RX ADMIN — Medication 2: at 15:48

## 2019-04-15 RX ADMIN — Medication 250 MILLIGRAM(S): at 05:56

## 2019-04-15 RX ADMIN — Medication 250 MILLIGRAM(S): at 21:50

## 2019-04-15 RX ADMIN — Medication 5 UNIT(S): at 11:05

## 2019-04-15 RX ADMIN — INSULIN GLARGINE 15 UNIT(S): 100 INJECTION, SOLUTION SUBCUTANEOUS at 08:58

## 2019-04-15 RX ADMIN — Medication 250 MILLIGRAM(S): at 14:33

## 2019-04-15 NOTE — PROGRESS NOTE ADULT - ASSESSMENT
63 yo DM w/ left foot abscess, and high suspicion for OM  - Pt seen and evaluated  - No purulence drained today  - Flushed again with copious saline  - Wound packed plantarly  - Pt unable to get MRI 2/2 foreign body in 2nd toe  - Clinical and lab results all indicative of osteomyelitis of 5th ray  - Pt rescheduled for Thursday at 12:15 pm for partial 5th ray resection   - d/w attending

## 2019-04-15 NOTE — DIETITIAN INITIAL EVALUATION ADULT. - NS AS NUTRI INTERV ED CONTENT
Survival information/Recommended modifications/Purpose of the nutrition education/Nutrition relationship to health/disease/Provided pt with DM MNT literature/education

## 2019-04-15 NOTE — DIETITIAN INITIAL EVALUATION ADULT. - PHYSICAL APPEARANCE
BMI: 17.7; Edema 1+ R foot, edema 2+ L ankle and L foot; Nutrition focused physical exam conducted; Subcutaneous fat loss: [severe] Orbital fat pads region, [severe]Buccal fat region, [moderate]Triceps region,  [unable]Ribs region.  Muscle wasting: [severe]Temples region, [severe]Clavicle region, [moderate]Shoulder region, [unable]Scapula region, [severe]Interosseous region,  [moderate]thigh region, [moderate]Calf region/underweight

## 2019-04-15 NOTE — DIETITIAN INITIAL EVALUATION ADULT. - OTHER INFO
Pt seen for BMI < 18 and A1c > 15.5%. Pt reported good appetite, po intake 100% during hospitalization. Pt reported he did not follow nutrition-related recommendations for DM pta but intends to implement dietary changes once he is discharged to prevent amputations in the future. Pt reported he likes rice crispies with skim milk, barbecue chicken with potatoes, asparagus, green beans. Pt reported he and his brother do food shopping/cooking. Pt denied difficulty chewing/swallowing, denied n/v/d/c. Pt reported last BM yesterday (4/14). Pt confirmed allergy to bananas and cauliflower.

## 2019-04-15 NOTE — CHART NOTE - NSCHARTNOTEFT_GEN_A_CORE
Upon Nutritional Assessment by the Registered Dietitian your patient was determined to meet criteria / has evidence of the following diagnosis/diagnoses:          [ ]  Mild Protein Calorie Malnutrition        [ ]  Moderate Protein Calorie Malnutrition        [x] Severe Protein Calorie Malnutrition        [ ] Unspecified Protein Calorie Malnutrition        [x] Underweight / BMI <19        [ ] Morbid Obesity / BMI > 40      Findings as based on:  •  Comprehensive nutrition assessment and consultation  •  Calorie counts (nutrient intake analysis)  •  Food acceptance and intake status from observations by staff  •  Follow up  •  Patient education  •  Intervention secondary to interdisciplinary rounds  •   concerns      Treatment:    The following diet has been recommended:  Consistent Carbohydrate (no snacks) + Glucerna 3x daily (660 kcals & 30 gm protein)    PROVIDER Section:     By signing this assessment you are acknowledging and agree with the diagnosis/diagnoses assigned by the Registered Dietitian    Comments:

## 2019-04-15 NOTE — PROGRESS NOTE ADULT - ASSESSMENT
65 yo M w/ PMHx of diabetes presents to the ED complaint of left foot pain.  Describes pain in his left foot and leg which caused him to fall.  Initially complained of back pain in his lower back, but now feels better.  Has swelling, blistering and sores on the left foot which he says has been progressing over the last few months.  Pt states he saw a podiatrist who sent him to the hospital.  Denies fever or chills.  He does admit to stopping his insulin a few months ago and has not been on any diabetic medications since.    Left foot pain, likely Osteomyelitis  New 4 mm linear radiopaque foreign body in the soft tissue between the   second and third proximal phalanges in the plantar aspect  Could not get MRI d/t foreign body   Podiatry following - plan for partial toe amputation later today  ID following  On IV Vanco and Zosyn  vanco trough low and changed to 1g q8  local wound care    IDDM  Increase Lantus to 30 units BID (patient reports taking Solostar 30 units BID at home) also take 15 units of premeal   add 5 u premeal covergae   slowly improving   SSI coverage  needs follow up with endo on dc   diabetic diet  HgbA1C >15 65 yo M w/ PMHx of diabetes presents to the ED complaint of left foot pain.  Describes pain in his left foot and leg which caused him to fall.  Initially complained of back pain in his lower back, but now feels better.  Has swelling, blistering and sores on the left foot which he says has been progressing over the last few months.  Pt states he saw a podiatrist who sent him to the hospital.  Denies fever or chills.  He does admit to stopping his insulin a few months ago and has not been on any diabetic medications since.    Left foot pain, likely Osteomyelitis  New 4 mm linear radiopaque foreign body in the soft tissue between the   second and third proximal phalanges in the plantar aspect  Could not get MRI d/t foreign body   Podiatry following - plan for partial toe amputation tomorrow, NPO after MN  ID following  On IV Vanco and Zosyn  vanco trough low and changed to 1g q8  local wound care    IDDM  Increase Lantus to 30 units BID (patient reports taking Solostar 30 units BID at home) also take 15 units of premeal   add 5 u premeal covergae   slowly improving   SSI coverage  needs follow up with endo on dc   diabetic diet  HgbA1C >15

## 2019-04-15 NOTE — DIETITIAN INITIAL EVALUATION ADULT. - PERTINENT LABORATORY DATA
04-15 Na141 mmol/L Glu 258 mg/dL<H> K+ 3.9 mmol/L Cr  1.01 mg/dL BUN 32 mg/dL<H> WBC 5.97 K/uL 04-11 Alb 2.8 g/dL<L> 04-12 [id number] >15.5 %<H>

## 2019-04-15 NOTE — PROGRESS NOTE ADULT - ASSESSMENT
Plan:   To OR today for Left foot partial 5th ray resection at 8:15 AM  Medical clearance since 4/14 and documented in chart.  Consent signed and in chart.  Procedure was explained to patient in detail. All alternatives, risks and complications were discussed. All questions answered.

## 2019-04-15 NOTE — DIETITIAN INITIAL EVALUATION ADULT. - PERTINENT MEDS FT
MEDICATIONS  (STANDING):  dextrose 5%. 1000 milliLiter(s) (50 mL/Hr) IV Continuous <Continuous>  dextrose 50% Injectable 12.5 Gram(s) IV Push once  dextrose 50% Injectable 25 Gram(s) IV Push once  dextrose 50% Injectable 25 Gram(s) IV Push once  insulin glargine Injectable (LANTUS) 30 Unit(s) SubCutaneous two times a day  insulin lispro (HumaLOG) corrective regimen sliding scale   SubCutaneous three times a day before meals  insulin lispro (HumaLOG) corrective regimen sliding scale   SubCutaneous at bedtime  insulin lispro Injectable (HumaLOG) 5 Unit(s) SubCutaneous three times a day before meals  piperacillin/tazobactam IVPB. 3.375 Gram(s) IV Intermittent every 8 hours  vancomycin  IVPB 1000 milliGRAM(s) IV Intermittent every 8 hours    MEDICATIONS  (PRN):  acetaminophen   Tablet .. 650 milliGRAM(s) Oral every 6 hours PRN Mild Pain (1 - 3)  dextrose 40% Gel 15 Gram(s) Oral once PRN Blood Glucose LESS THAN 70 milliGRAM(s)/deciliter  glucagon  Injectable 1 milliGRAM(s) IntraMuscular once PRN Glucose LESS THAN 70 milligrams/deciliter

## 2019-04-15 NOTE — PHARMACY COMMUNICATION NOTE - COMMENTS
4/15/19 Teom w nurse. 15units lantus 1x was ordered (in place of the 30unit am dose) Told nurse to document that 30 unit was not being given this Am & 15units were being given in its placeI

## 2019-04-16 ENCOUNTER — RESULT REVIEW (OUTPATIENT)
Age: 64
End: 2019-04-16

## 2019-04-16 LAB
ALBUMIN SERPL ELPH-MCNC: 2.6 G/DL — LOW (ref 3.3–5)
ALP SERPL-CCNC: 74 U/L — SIGNIFICANT CHANGE UP (ref 40–120)
ALT FLD-CCNC: 14 U/L — SIGNIFICANT CHANGE UP (ref 12–78)
ANION GAP SERPL CALC-SCNC: 6 MMOL/L — SIGNIFICANT CHANGE UP (ref 5–17)
APTT BLD: 30 SEC — SIGNIFICANT CHANGE UP (ref 28.5–37)
AST SERPL-CCNC: 12 U/L — LOW (ref 15–37)
BILIRUB SERPL-MCNC: 0.1 MG/DL — LOW (ref 0.2–1.2)
BUN SERPL-MCNC: 25 MG/DL — HIGH (ref 7–23)
CALCIUM SERPL-MCNC: 9.2 MG/DL — SIGNIFICANT CHANGE UP (ref 8.5–10.1)
CHLORIDE SERPL-SCNC: 110 MMOL/L — HIGH (ref 96–108)
CO2 SERPL-SCNC: 27 MMOL/L — SIGNIFICANT CHANGE UP (ref 22–31)
CREAT SERPL-MCNC: 0.8 MG/DL — SIGNIFICANT CHANGE UP (ref 0.5–1.3)
CULTURE RESULTS: SIGNIFICANT CHANGE UP
CULTURE RESULTS: SIGNIFICANT CHANGE UP
GLUCOSE SERPL-MCNC: 124 MG/DL — HIGH (ref 70–99)
HCT VFR BLD CALC: 35.5 % — LOW (ref 39–50)
HGB BLD-MCNC: 11.3 G/DL — LOW (ref 13–17)
INR BLD: 0.98 RATIO — SIGNIFICANT CHANGE UP (ref 0.88–1.16)
MCHC RBC-ENTMCNC: 30.5 PG — SIGNIFICANT CHANGE UP (ref 27–34)
MCHC RBC-ENTMCNC: 31.8 GM/DL — LOW (ref 32–36)
MCV RBC AUTO: 95.9 FL — SIGNIFICANT CHANGE UP (ref 80–100)
NRBC # BLD: 0 /100 WBCS — SIGNIFICANT CHANGE UP (ref 0–0)
PLATELET # BLD AUTO: 278 K/UL — SIGNIFICANT CHANGE UP (ref 150–400)
POTASSIUM SERPL-MCNC: 3.9 MMOL/L — SIGNIFICANT CHANGE UP (ref 3.5–5.3)
POTASSIUM SERPL-SCNC: 3.9 MMOL/L — SIGNIFICANT CHANGE UP (ref 3.5–5.3)
PROT SERPL-MCNC: 7.5 GM/DL — SIGNIFICANT CHANGE UP (ref 6–8.3)
PROTHROM AB SERPL-ACNC: 11 SEC — SIGNIFICANT CHANGE UP (ref 10–12.9)
RBC # BLD: 3.7 M/UL — LOW (ref 4.2–5.8)
RBC # FLD: 13.3 % — SIGNIFICANT CHANGE UP (ref 10.3–14.5)
SODIUM SERPL-SCNC: 143 MMOL/L — SIGNIFICANT CHANGE UP (ref 135–145)
SPECIMEN SOURCE: SIGNIFICANT CHANGE UP
SPECIMEN SOURCE: SIGNIFICANT CHANGE UP
VANCOMYCIN TROUGH SERPL-MCNC: 18.8 UG/ML — SIGNIFICANT CHANGE UP (ref 10–20)
WBC # BLD: 5.67 K/UL — SIGNIFICANT CHANGE UP (ref 3.8–10.5)
WBC # FLD AUTO: 5.67 K/UL — SIGNIFICANT CHANGE UP (ref 3.8–10.5)

## 2019-04-16 PROCEDURE — 99232 SBSQ HOSP IP/OBS MODERATE 35: CPT

## 2019-04-16 PROCEDURE — 73620 X-RAY EXAM OF FOOT: CPT | Mod: 26,LT

## 2019-04-16 RX ORDER — GLUCAGON INJECTION, SOLUTION 0.5 MG/.1ML
1 INJECTION, SOLUTION SUBCUTANEOUS ONCE
Qty: 0 | Refills: 0 | Status: DISCONTINUED | OUTPATIENT
Start: 2019-04-16 | End: 2019-04-23

## 2019-04-16 RX ORDER — SODIUM CHLORIDE 9 MG/ML
1000 INJECTION INTRAMUSCULAR; INTRAVENOUS; SUBCUTANEOUS
Qty: 0 | Refills: 0 | Status: DISCONTINUED | OUTPATIENT
Start: 2019-04-16 | End: 2019-04-21

## 2019-04-16 RX ORDER — SODIUM CHLORIDE 9 MG/ML
1000 INJECTION, SOLUTION INTRAVENOUS
Qty: 0 | Refills: 0 | Status: DISCONTINUED | OUTPATIENT
Start: 2019-04-16 | End: 2019-04-16

## 2019-04-16 RX ORDER — INSULIN GLARGINE 100 [IU]/ML
30 INJECTION, SOLUTION SUBCUTANEOUS
Qty: 0 | Refills: 0 | Status: DISCONTINUED | OUTPATIENT
Start: 2019-04-16 | End: 2019-04-23

## 2019-04-16 RX ORDER — INSULIN GLARGINE 100 [IU]/ML
15 INJECTION, SOLUTION SUBCUTANEOUS ONCE
Qty: 0 | Refills: 0 | Status: COMPLETED | OUTPATIENT
Start: 2019-04-16 | End: 2019-04-16

## 2019-04-16 RX ORDER — INSULIN LISPRO 100/ML
5 VIAL (ML) SUBCUTANEOUS
Qty: 0 | Refills: 0 | Status: DISCONTINUED | OUTPATIENT
Start: 2019-04-16 | End: 2019-04-23

## 2019-04-16 RX ORDER — HYDROMORPHONE HYDROCHLORIDE 2 MG/ML
0.5 INJECTION INTRAMUSCULAR; INTRAVENOUS; SUBCUTANEOUS
Qty: 0 | Refills: 0 | Status: DISCONTINUED | OUTPATIENT
Start: 2019-04-16 | End: 2019-04-16

## 2019-04-16 RX ORDER — ACETAMINOPHEN 500 MG
1000 TABLET ORAL ONCE
Qty: 0 | Refills: 0 | Status: COMPLETED | OUTPATIENT
Start: 2019-04-16 | End: 2019-04-16

## 2019-04-16 RX ORDER — DEXTROSE 50 % IN WATER 50 %
25 SYRINGE (ML) INTRAVENOUS ONCE
Qty: 0 | Refills: 0 | Status: DISCONTINUED | OUTPATIENT
Start: 2019-04-16 | End: 2019-04-23

## 2019-04-16 RX ORDER — ACETAMINOPHEN 500 MG
650 TABLET ORAL EVERY 6 HOURS
Qty: 0 | Refills: 0 | Status: DISCONTINUED | OUTPATIENT
Start: 2019-04-16 | End: 2019-04-23

## 2019-04-16 RX ORDER — LACTOBACILLUS ACIDOPHILUS 100MM CELL
1 CAPSULE ORAL THREE TIMES A DAY
Qty: 0 | Refills: 0 | Status: DISCONTINUED | OUTPATIENT
Start: 2019-04-16 | End: 2019-04-23

## 2019-04-16 RX ORDER — PIPERACILLIN AND TAZOBACTAM 4; .5 G/20ML; G/20ML
3.38 INJECTION, POWDER, LYOPHILIZED, FOR SOLUTION INTRAVENOUS EVERY 8 HOURS
Qty: 0 | Refills: 0 | Status: DISCONTINUED | OUTPATIENT
Start: 2019-04-16 | End: 2019-04-22

## 2019-04-16 RX ORDER — SODIUM CHLORIDE 9 MG/ML
1000 INJECTION, SOLUTION INTRAVENOUS
Qty: 0 | Refills: 0 | Status: DISCONTINUED | OUTPATIENT
Start: 2019-04-16 | End: 2019-04-23

## 2019-04-16 RX ORDER — INSULIN LISPRO 100/ML
VIAL (ML) SUBCUTANEOUS AT BEDTIME
Qty: 0 | Refills: 0 | Status: DISCONTINUED | OUTPATIENT
Start: 2019-04-16 | End: 2019-04-23

## 2019-04-16 RX ORDER — DEXTROSE 50 % IN WATER 50 %
12.5 SYRINGE (ML) INTRAVENOUS ONCE
Qty: 0 | Refills: 0 | Status: DISCONTINUED | OUTPATIENT
Start: 2019-04-16 | End: 2019-04-23

## 2019-04-16 RX ORDER — METOCLOPRAMIDE HCL 10 MG
10 TABLET ORAL ONCE
Qty: 0 | Refills: 0 | Status: DISCONTINUED | OUTPATIENT
Start: 2019-04-16 | End: 2019-04-16

## 2019-04-16 RX ORDER — DEXTROSE 50 % IN WATER 50 %
15 SYRINGE (ML) INTRAVENOUS ONCE
Qty: 0 | Refills: 0 | Status: DISCONTINUED | OUTPATIENT
Start: 2019-04-16 | End: 2019-04-23

## 2019-04-16 RX ORDER — OXYCODONE AND ACETAMINOPHEN 5; 325 MG/1; MG/1
1 TABLET ORAL EVERY 4 HOURS
Qty: 0 | Refills: 0 | Status: DISCONTINUED | OUTPATIENT
Start: 2019-04-16 | End: 2019-04-23

## 2019-04-16 RX ORDER — INSULIN LISPRO 100/ML
VIAL (ML) SUBCUTANEOUS
Qty: 0 | Refills: 0 | Status: DISCONTINUED | OUTPATIENT
Start: 2019-04-16 | End: 2019-04-23

## 2019-04-16 RX ORDER — VANCOMYCIN HCL 1 G
1000 VIAL (EA) INTRAVENOUS EVERY 8 HOURS
Qty: 0 | Refills: 0 | Status: DISCONTINUED | OUTPATIENT
Start: 2019-04-16 | End: 2019-04-22

## 2019-04-16 RX ADMIN — PIPERACILLIN AND TAZOBACTAM 25 GRAM(S): 4; .5 INJECTION, POWDER, LYOPHILIZED, FOR SOLUTION INTRAVENOUS at 21:01

## 2019-04-16 RX ADMIN — INSULIN GLARGINE 15 UNIT(S): 100 INJECTION, SOLUTION SUBCUTANEOUS at 08:49

## 2019-04-16 RX ADMIN — Medication 400 MILLIGRAM(S): at 18:01

## 2019-04-16 RX ADMIN — Medication 1 TABLET(S): at 21:01

## 2019-04-16 RX ADMIN — PIPERACILLIN AND TAZOBACTAM 25 GRAM(S): 4; .5 INJECTION, POWDER, LYOPHILIZED, FOR SOLUTION INTRAVENOUS at 08:23

## 2019-04-16 RX ADMIN — Medication 250 MILLIGRAM(S): at 05:59

## 2019-04-16 RX ADMIN — SODIUM CHLORIDE 50 MILLILITER(S): 9 INJECTION, SOLUTION INTRAVENOUS at 01:05

## 2019-04-16 RX ADMIN — Medication 1 TABLET(S): at 05:12

## 2019-04-16 RX ADMIN — Medication 1 TABLET(S): at 13:15

## 2019-04-16 RX ADMIN — PIPERACILLIN AND TAZOBACTAM 25 GRAM(S): 4; .5 INJECTION, POWDER, LYOPHILIZED, FOR SOLUTION INTRAVENOUS at 00:27

## 2019-04-16 RX ADMIN — Medication 250 MILLIGRAM(S): at 21:01

## 2019-04-16 RX ADMIN — INSULIN GLARGINE 30 UNIT(S): 100 INJECTION, SOLUTION SUBCUTANEOUS at 21:07

## 2019-04-16 RX ADMIN — Medication 1000 MILLIGRAM(S): at 18:14

## 2019-04-16 RX ADMIN — Medication 250 MILLIGRAM(S): at 15:32

## 2019-04-16 NOTE — PROGRESS NOTE ADULT - ASSESSMENT
Plan:   To OR today at 3:30pm with Dr. Barrios for left foot partial 5th ray resection.   CXR on sunrise.  EKG on sunrise.  Medical/Cardiac clearance since 4/14 and documented in chart.  Consent signed and in chart.  Procedure was explained to patient in detail. All alternatives, risks and complications were discussed. All questions answered.

## 2019-04-16 NOTE — PROGRESS NOTE ADULT - ASSESSMENT
63 yo M w/ PMHx of diabetes presents to the ED complaint of left foot pain.  Describes pain in his left foot and leg which caused him to fall.  Initially complained of back pain in his lower back, but now feels better.  Has swelling, blistering and sores on the left foot which he says has been progressing over the last few months.  Pt states he saw a podiatrist who sent him to the hospital.  Denies fever or chills.  He does admit to stopping his insulin a few months ago and has not been on any diabetic medications since.    Left foot pain, likely Osteomyelitis  New 4 mm linear radiopaque foreign body in the soft tissue between the   second and third proximal phalanges in the plantar aspect  Could not get MRI d/t foreign body   Podiatry following - plan for partial toe amputation today, keep NPO with low dose intravenous fluids  ID following  On IV Vanco and Zosyn  local wound care    IDDM  Increase Lantus to 30 units BID (patient reports taking Solostar 30 units BID at home) also take 15 units of premeal   add 5 u premeal coverage   slowly improving   SSI coverage  needs follow up with endo on dc   diabetic diet  HgbA1C >15

## 2019-04-17 LAB
ANION GAP SERPL CALC-SCNC: 6 MMOL/L — SIGNIFICANT CHANGE UP (ref 5–17)
BUN SERPL-MCNC: 22 MG/DL — SIGNIFICANT CHANGE UP (ref 7–23)
CALCIUM SERPL-MCNC: 8.9 MG/DL — SIGNIFICANT CHANGE UP (ref 8.5–10.1)
CHLORIDE SERPL-SCNC: 111 MMOL/L — HIGH (ref 96–108)
CO2 SERPL-SCNC: 27 MMOL/L — SIGNIFICANT CHANGE UP (ref 22–31)
CREAT SERPL-MCNC: 0.85 MG/DL — SIGNIFICANT CHANGE UP (ref 0.5–1.3)
GLUCOSE SERPL-MCNC: 131 MG/DL — HIGH (ref 70–99)
GRAM STN FLD: SIGNIFICANT CHANGE UP
HCT VFR BLD CALC: 34.9 % — LOW (ref 39–50)
HGB BLD-MCNC: 10.8 G/DL — LOW (ref 13–17)
MCHC RBC-ENTMCNC: 29.9 PG — SIGNIFICANT CHANGE UP (ref 27–34)
MCHC RBC-ENTMCNC: 30.9 GM/DL — LOW (ref 32–36)
MCV RBC AUTO: 96.7 FL — SIGNIFICANT CHANGE UP (ref 80–100)
NRBC # BLD: 0 /100 WBCS — SIGNIFICANT CHANGE UP (ref 0–0)
PLATELET # BLD AUTO: 283 K/UL — SIGNIFICANT CHANGE UP (ref 150–400)
POTASSIUM SERPL-MCNC: 4 MMOL/L — SIGNIFICANT CHANGE UP (ref 3.5–5.3)
POTASSIUM SERPL-SCNC: 4 MMOL/L — SIGNIFICANT CHANGE UP (ref 3.5–5.3)
RBC # BLD: 3.61 M/UL — LOW (ref 4.2–5.8)
RBC # FLD: 13.6 % — SIGNIFICANT CHANGE UP (ref 10.3–14.5)
SODIUM SERPL-SCNC: 144 MMOL/L — SIGNIFICANT CHANGE UP (ref 135–145)
SPECIMEN SOURCE: SIGNIFICANT CHANGE UP
VANCOMYCIN TROUGH SERPL-MCNC: 14.7 UG/ML — SIGNIFICANT CHANGE UP (ref 10–20)
WBC # BLD: 5.82 K/UL — SIGNIFICANT CHANGE UP (ref 3.8–10.5)
WBC # FLD AUTO: 5.82 K/UL — SIGNIFICANT CHANGE UP (ref 3.8–10.5)

## 2019-04-17 PROCEDURE — 99232 SBSQ HOSP IP/OBS MODERATE 35: CPT

## 2019-04-17 PROCEDURE — 73630 X-RAY EXAM OF FOOT: CPT | Mod: 26,LT

## 2019-04-17 PROCEDURE — 93971 EXTREMITY STUDY: CPT | Mod: 26,RT

## 2019-04-17 PROCEDURE — 99233 SBSQ HOSP IP/OBS HIGH 50: CPT

## 2019-04-17 RX ADMIN — Medication 1 TABLET(S): at 05:13

## 2019-04-17 RX ADMIN — Medication 1 TABLET(S): at 22:07

## 2019-04-17 RX ADMIN — OXYCODONE AND ACETAMINOPHEN 1 TABLET(S): 5; 325 TABLET ORAL at 04:14

## 2019-04-17 RX ADMIN — PIPERACILLIN AND TAZOBACTAM 25 GRAM(S): 4; .5 INJECTION, POWDER, LYOPHILIZED, FOR SOLUTION INTRAVENOUS at 22:07

## 2019-04-17 RX ADMIN — Medication 1 TABLET(S): at 13:58

## 2019-04-17 RX ADMIN — OXYCODONE AND ACETAMINOPHEN 1 TABLET(S): 5; 325 TABLET ORAL at 05:25

## 2019-04-17 RX ADMIN — PIPERACILLIN AND TAZOBACTAM 25 GRAM(S): 4; .5 INJECTION, POWDER, LYOPHILIZED, FOR SOLUTION INTRAVENOUS at 05:14

## 2019-04-17 RX ADMIN — Medication 5 UNIT(S): at 11:37

## 2019-04-17 RX ADMIN — Medication 250 MILLIGRAM(S): at 05:13

## 2019-04-17 RX ADMIN — OXYCODONE AND ACETAMINOPHEN 1 TABLET(S): 5; 325 TABLET ORAL at 15:55

## 2019-04-17 RX ADMIN — INSULIN GLARGINE 30 UNIT(S): 100 INJECTION, SOLUTION SUBCUTANEOUS at 08:51

## 2019-04-17 RX ADMIN — Medication 5 UNIT(S): at 07:53

## 2019-04-17 RX ADMIN — OXYCODONE AND ACETAMINOPHEN 1 TABLET(S): 5; 325 TABLET ORAL at 15:22

## 2019-04-17 RX ADMIN — PIPERACILLIN AND TAZOBACTAM 25 GRAM(S): 4; .5 INJECTION, POWDER, LYOPHILIZED, FOR SOLUTION INTRAVENOUS at 13:58

## 2019-04-17 RX ADMIN — INSULIN GLARGINE 30 UNIT(S): 100 INJECTION, SOLUTION SUBCUTANEOUS at 22:07

## 2019-04-17 NOTE — PHYSICAL THERAPY INITIAL EVALUATION ADULT - TINETTI GAIT TEST, REHAB EVAL
Indication of gait -1/1,   Step Length and height -2/2,   Foot Clearance -2/2,   Step Symmetry - 1/1,  Step Continuity -1/1,   Path -1/ 2,   Trunk -2/2,   Walking Time -0/1,   Total Score - 10/12

## 2019-04-17 NOTE — PHYSICAL THERAPY INITIAL EVALUATION ADULT - CRITERIA FOR SKILLED THERAPEUTIC INTERVENTIONS
therapy frequency/anticipated discharge recommendation/impairments found/risk reduction/prevention/rehab potential/predicted duration of therapy intervention/anticipated equipment needs at discharge/functional limitations in following categories

## 2019-04-17 NOTE — PHYSICAL THERAPY INITIAL EVALUATION ADULT - GENERAL OBSERVATIONS, REHAB EVAL
Pt was found supine in bed, alert and oriented x4. Pt is very motivated and cooperative during treatment session. Pt denied pain/discomfort prior to PT session.

## 2019-04-17 NOTE — PHYSICAL THERAPY INITIAL EVALUATION ADULT - TRANSFER TRAINING, PT EVAL
Pt will perform sit to stand transfer independently c Rolling Walker,  WBAT LLE and darco shoe by discharge . Pt will perform sit to stand transfer independently c Rolling Walker,  WBAT L heel c darco shoe by discharge .

## 2019-04-17 NOTE — PROGRESS NOTE ADULT - ASSESSMENT
65 yo M s/p partial 5th ray resection (DOS 4/16)  - POD #1  - Dressing left intact there is a stravix graft on, do not change the post-op dressings, reinforce if strikethrough  - Still a high concern for residual osteomyelitis, pt will likely need a PICC  - Awaiting OR data for abx plan   - Will defer to ID for abx chey

## 2019-04-17 NOTE — PHYSICAL THERAPY INITIAL EVALUATION ADULT - GAIT TRAINING, PT EVAL
Pt will ambulate 150ft independently c Rolling Walker, WBAT LLE and darco shoe within 4 weeks. Pt will ambulate 150ft independently c Rolling Walker, WBAT L heel c darco shoe within 4 weeks.

## 2019-04-17 NOTE — PHYSICAL THERAPY INITIAL EVALUATION ADULT - ADDITIONAL COMMENTS
Pt lives in a condo with brother and cousin c 1 step to enter and uses an elevator to get to room. Pt states he was independent c all ADLs prior to admission. Pt uses a straight cane for home and community ambulation.

## 2019-04-17 NOTE — PHYSICAL THERAPY INITIAL EVALUATION ADULT - TINETTI BALANCE TEST, REHAB EVAL
Sitting Balance - 1/1 , Rises From Chair - 1/2, Attempts to rise - 2/2 , Immediate Standing Balance - 2/2,  Standing Balance - 1/2, Nudged -  1/2, Eyes Closed - 1/1,  Turning 360 Deg -  1/2, Sitting down - 1/2, Balance Score - 9/16

## 2019-04-17 NOTE — CHART NOTE - NSCHARTNOTEFT_GEN_A_CORE
Medicine Hospitalist PA    Called about right upper extremity doppler, +for right cephalic thrombus. Notified Dr Paul. Warm compress and monitor.

## 2019-04-17 NOTE — PHYSICAL THERAPY INITIAL EVALUATION ADULT - IMPAIRMENTS FOUND, PT EVAL
ergonomics and body mechanics/muscle strength/aerobic capacity/endurance/gait, locomotion, and balance

## 2019-04-17 NOTE — PROGRESS NOTE ADULT - ASSESSMENT
65 yo M w/ PMHx of diabetes presents to the ED complaint of left foot pain.  Describes pain in his left foot and leg which caused him to fall.  Initially complained of back pain in his lower back, but now feels better.  Has swelling, blistering and sores on the left foot which he says has been progressing over the last few months.  Pt states he saw a podiatrist who sent him to the hospital.  Denies fever or chills.  He does admit to stopping his insulin a few months ago and has not been on any diabetic medications since.    Left foot pain, likely Osteomyelitis  New 4 mm linear radiopaque foreign body in the soft tissue between the second and third proximal phalanges in the plantar aspect  s/p partial 5th ray resection (DOS 4/16) - POD #1  On IV Vanco and Zosyn  ID following  local wound care    IDDM  Increase Lantus to 30 units BID (patient reports taking Solostar 30 units BID at home) also take 15 units of premeal   add 5 u premeal coverage   slowly improving   SSI coverage  needs follow up with endo on dc   diabetic diet  HgbA1C >15

## 2019-04-17 NOTE — CHART NOTE - NSCHARTNOTEFT_GEN_A_CORE
Hospitalist Medicine DNP    CC: right arm swelling    HPI: 63 yo M w/ PMHx  of diabetes a/w left foot OM    Vital Signs Last 24 Hrs  T(C): 36.4 (17 Apr 2019 09:35), Max: 36.6 (17 Apr 2019 01:30)  T(F): 97.6 (17 Apr 2019 09:35), Max: 97.8 (17 Apr 2019 01:30)  HR: 79 (17 Apr 2019 14:07) (59 - 80)  BP: 102/62 (17 Apr 2019 14:07) (102/52 - 129/64)  BP(mean): --  RR: 18 (17 Apr 2019 14:07) (14 - 18)  SpO2: 100% (17 Apr 2019 14:07) (96% - 100%)    REVIEW OF SYSTEMS:  RESPIRATORY: No cough, wheezing, chills or hemoptysis; No shortness of breath  CARDIOVASCULAR: No chest pain, palpitations, dizziness, or leg swelling  Extremities: Right upper arm swelling    PHYSICAL EXAM:  GENERAL: NAD, well-groomed, well-developed  NERVOUS SYSTEM:  Alert & Oriented X3  CHEST/LUNG: Clear to percussion bilaterally; No rales, rhonchi, wheezing, or rubs  HEART: Regular rate and rhythm; No murmurs, rubs, or gallops  EXTREMITIES: right upper arm swollen non pitting edema;  2+ Peripheral Pulses, No clubbing, cyanosis,    Assessment: Patient 64y with PMhx DM a/w OSTEOMYELITIS OF FOOT LEFT ACUTE  seen for right arm swelling infiltration vs dvt    Plan:  - US right arm   - d/c IV heplock  - elevate the arm.   - Continue current treatment  - D/w Dr. Paul aware and agree with the plan  - will continue to follow up  Time Spent: 30 mins

## 2019-04-17 NOTE — PHYSICAL THERAPY INITIAL EVALUATION ADULT - GAIT DEVIATIONS NOTED, PT EVAL
decreased terese/decreased step length/decreased stride length/decreased weight-shifting ability/increased time in double stance

## 2019-04-18 DIAGNOSIS — M79.89 OTHER SPECIFIED SOFT TISSUE DISORDERS: ICD-10-CM

## 2019-04-18 PROCEDURE — 99233 SBSQ HOSP IP/OBS HIGH 50: CPT

## 2019-04-18 PROCEDURE — 99232 SBSQ HOSP IP/OBS MODERATE 35: CPT

## 2019-04-18 RX ADMIN — Medication 1 TABLET(S): at 13:45

## 2019-04-18 RX ADMIN — OXYCODONE AND ACETAMINOPHEN 1 TABLET(S): 5; 325 TABLET ORAL at 23:09

## 2019-04-18 RX ADMIN — PIPERACILLIN AND TAZOBACTAM 25 GRAM(S): 4; .5 INJECTION, POWDER, LYOPHILIZED, FOR SOLUTION INTRAVENOUS at 23:08

## 2019-04-18 RX ADMIN — Medication 250 MILLIGRAM(S): at 18:46

## 2019-04-18 RX ADMIN — Medication 250 MILLIGRAM(S): at 11:50

## 2019-04-18 RX ADMIN — PIPERACILLIN AND TAZOBACTAM 25 GRAM(S): 4; .5 INJECTION, POWDER, LYOPHILIZED, FOR SOLUTION INTRAVENOUS at 06:15

## 2019-04-18 RX ADMIN — Medication 250 MILLIGRAM(S): at 23:09

## 2019-04-18 RX ADMIN — INSULIN GLARGINE 30 UNIT(S): 100 INJECTION, SOLUTION SUBCUTANEOUS at 08:03

## 2019-04-18 RX ADMIN — Medication 5 UNIT(S): at 17:16

## 2019-04-18 RX ADMIN — INSULIN GLARGINE 30 UNIT(S): 100 INJECTION, SOLUTION SUBCUTANEOUS at 23:33

## 2019-04-18 RX ADMIN — Medication 250 MILLIGRAM(S): at 02:39

## 2019-04-18 RX ADMIN — Medication 2: at 08:01

## 2019-04-18 RX ADMIN — Medication 5 UNIT(S): at 08:02

## 2019-04-18 RX ADMIN — Medication 1 TABLET(S): at 23:09

## 2019-04-18 RX ADMIN — Medication 1 TABLET(S): at 06:15

## 2019-04-18 RX ADMIN — Medication 5 UNIT(S): at 12:26

## 2019-04-18 RX ADMIN — Medication 2: at 12:26

## 2019-04-18 RX ADMIN — OXYCODONE AND ACETAMINOPHEN 1 TABLET(S): 5; 325 TABLET ORAL at 06:13

## 2019-04-18 RX ADMIN — OXYCODONE AND ACETAMINOPHEN 1 TABLET(S): 5; 325 TABLET ORAL at 07:15

## 2019-04-18 RX ADMIN — PIPERACILLIN AND TAZOBACTAM 25 GRAM(S): 4; .5 INJECTION, POWDER, LYOPHILIZED, FOR SOLUTION INTRAVENOUS at 13:45

## 2019-04-18 NOTE — PROGRESS NOTE ADULT - ASSESSMENT
65 yo M w/ PMHx of diabetes presents to the ED complaint of left foot pain.  Describes pain in his left foot and leg which caused him to fall.  Initially complained of back pain in his lower back, but now feels better.  Has swelling, blistering and sores on the left foot which he says has been progressing over the last few months.  Pt states he saw a podiatrist who sent him to the hospital.  Denies fever or chills.  He does admit to stopping his insulin a few months ago and has not been on any diabetic medications since.    Left foot pain, likely Osteomyelitis  New 4 mm linear radiopaque foreign body in the soft tissue between the second and third proximal phalanges in the plantar aspect  s/p partial 5th ray resection (DOS 4/16) - POD #2  On IV Vanco and Zosyn  ID following  local wound care, awaiting OR cultured for further management     IDDM  Increase Lantus to 30 units BID (patient reports taking Solostar 30 units BID at home)  15 units of premeal     u premeal coverage   slowly improving   SSI coverage  needs follow up with endo on dc   diabetic diet  HgbA1C >15

## 2019-04-18 NOTE — PROGRESS NOTE ADULT - ASSESSMENT
63 yo M s/p partial 5th ray resection (DOS 4/16)  - POD #2  - skin incision dusky, concern for viability of flap, stravix grafts intact at this time  - Still a high concern for residual osteomyelitis, pt will likely need a PICC, plan discussed with ID  - Awaiting OR data for abx plan   - Will defer to ID for abx plan once pathology and cultures from OR completed  - seen w/ attending

## 2019-04-19 LAB
ANION GAP SERPL CALC-SCNC: 6 MMOL/L — SIGNIFICANT CHANGE UP (ref 5–17)
BUN SERPL-MCNC: 27 MG/DL — HIGH (ref 7–23)
CALCIUM SERPL-MCNC: 8.8 MG/DL — SIGNIFICANT CHANGE UP (ref 8.5–10.1)
CHLORIDE SERPL-SCNC: 110 MMOL/L — HIGH (ref 96–108)
CO2 SERPL-SCNC: 28 MMOL/L — SIGNIFICANT CHANGE UP (ref 22–31)
CREAT SERPL-MCNC: 0.82 MG/DL — SIGNIFICANT CHANGE UP (ref 0.5–1.3)
ERYTHROCYTE [SEDIMENTATION RATE] IN BLOOD: 92 MM/HR — HIGH (ref 0–20)
GLUCOSE SERPL-MCNC: 155 MG/DL — HIGH (ref 70–99)
HCT VFR BLD CALC: 33.4 % — LOW (ref 39–50)
HGB BLD-MCNC: 10.6 G/DL — LOW (ref 13–17)
MCHC RBC-ENTMCNC: 30.5 PG — SIGNIFICANT CHANGE UP (ref 27–34)
MCHC RBC-ENTMCNC: 31.7 GM/DL — LOW (ref 32–36)
MCV RBC AUTO: 96.3 FL — SIGNIFICANT CHANGE UP (ref 80–100)
NRBC # BLD: 0 /100 WBCS — SIGNIFICANT CHANGE UP (ref 0–0)
PLATELET # BLD AUTO: 278 K/UL — SIGNIFICANT CHANGE UP (ref 150–400)
POTASSIUM SERPL-MCNC: 3.9 MMOL/L — SIGNIFICANT CHANGE UP (ref 3.5–5.3)
POTASSIUM SERPL-SCNC: 3.9 MMOL/L — SIGNIFICANT CHANGE UP (ref 3.5–5.3)
RBC # BLD: 3.47 M/UL — LOW (ref 4.2–5.8)
RBC # FLD: 13.6 % — SIGNIFICANT CHANGE UP (ref 10.3–14.5)
SODIUM SERPL-SCNC: 144 MMOL/L — SIGNIFICANT CHANGE UP (ref 135–145)
WBC # BLD: 4.31 K/UL — SIGNIFICANT CHANGE UP (ref 3.8–10.5)
WBC # FLD AUTO: 4.31 K/UL — SIGNIFICANT CHANGE UP (ref 3.8–10.5)

## 2019-04-19 PROCEDURE — 99232 SBSQ HOSP IP/OBS MODERATE 35: CPT

## 2019-04-19 RX ORDER — PETROLATUM,WHITE
1 JELLY (GRAM) TOPICAL
Qty: 0 | Refills: 0 | Status: DISCONTINUED | OUTPATIENT
Start: 2019-04-19 | End: 2019-04-23

## 2019-04-19 RX ADMIN — Medication 1 TABLET(S): at 05:56

## 2019-04-19 RX ADMIN — OXYCODONE AND ACETAMINOPHEN 1 TABLET(S): 5; 325 TABLET ORAL at 05:55

## 2019-04-19 RX ADMIN — Medication 1 TABLET(S): at 22:20

## 2019-04-19 RX ADMIN — INSULIN GLARGINE 30 UNIT(S): 100 INJECTION, SOLUTION SUBCUTANEOUS at 22:20

## 2019-04-19 RX ADMIN — OXYCODONE AND ACETAMINOPHEN 1 TABLET(S): 5; 325 TABLET ORAL at 00:48

## 2019-04-19 RX ADMIN — Medication 2: at 16:29

## 2019-04-19 RX ADMIN — PIPERACILLIN AND TAZOBACTAM 25 GRAM(S): 4; .5 INJECTION, POWDER, LYOPHILIZED, FOR SOLUTION INTRAVENOUS at 22:20

## 2019-04-19 RX ADMIN — Medication 5 UNIT(S): at 11:01

## 2019-04-19 RX ADMIN — Medication 5 UNIT(S): at 07:49

## 2019-04-19 RX ADMIN — Medication 5 UNIT(S): at 16:29

## 2019-04-19 RX ADMIN — INSULIN GLARGINE 30 UNIT(S): 100 INJECTION, SOLUTION SUBCUTANEOUS at 08:42

## 2019-04-19 RX ADMIN — Medication 250 MILLIGRAM(S): at 01:48

## 2019-04-19 RX ADMIN — OXYCODONE AND ACETAMINOPHEN 1 TABLET(S): 5; 325 TABLET ORAL at 19:01

## 2019-04-19 RX ADMIN — PIPERACILLIN AND TAZOBACTAM 25 GRAM(S): 4; .5 INJECTION, POWDER, LYOPHILIZED, FOR SOLUTION INTRAVENOUS at 05:56

## 2019-04-19 RX ADMIN — Medication 250 MILLIGRAM(S): at 17:41

## 2019-04-19 RX ADMIN — Medication 1 APPLICATION(S): at 17:42

## 2019-04-19 RX ADMIN — PIPERACILLIN AND TAZOBACTAM 25 GRAM(S): 4; .5 INJECTION, POWDER, LYOPHILIZED, FOR SOLUTION INTRAVENOUS at 14:50

## 2019-04-19 RX ADMIN — Medication 250 MILLIGRAM(S): at 11:01

## 2019-04-19 RX ADMIN — Medication 2: at 07:48

## 2019-04-19 RX ADMIN — Medication 5 UNIT(S): at 07:53

## 2019-04-19 RX ADMIN — OXYCODONE AND ACETAMINOPHEN 1 TABLET(S): 5; 325 TABLET ORAL at 19:30

## 2019-04-19 RX ADMIN — Medication 1 TABLET(S): at 14:50

## 2019-04-19 NOTE — PROGRESS NOTE ADULT - ASSESSMENT
diabetic foot infection   daily cigar smoker   will get MRI foot my baseline but has still substantial issues on the foot   esr crp   covering dr andrade till next sunday

## 2019-04-19 NOTE — PROGRESS NOTE ADULT - ASSESSMENT
63 yo M w/ PMHx of diabetes presents to the ED complaint of left foot pain.  Describes pain in his left foot and leg which caused him to fall.  Initially complained of back pain in his lower back, but now feels better.  Has swelling, blistering and sores on the left foot which he says has been progressing over the last few months.  Pt states he saw a podiatrist who sent him to the hospital.  Denies fever or chills.  He does admit to stopping his insulin a few months ago and has not been on any diabetic medications since.    Left foot pain, likely Osteomyelitis  New 4 mm linear radiopaque foreign body in the soft tissue between the second and third proximal phalanges in the plantar aspect  s/p partial 5th ray resection (DOS 4/16) - POD #3  On IV Vanco and Zosyn  ID following  local wound care, awaiting OR cultured for further management     IDDM  Increase Lantus to 30 units BID (patient reports taking Solostar 30 units BID at home)  15 units of premeal     u premeal coverage   slowly improving   SSI coverage  needs follow up with endo on dc   diabetic diet  HgbA1C >15

## 2019-04-19 NOTE — PROGRESS NOTE ADULT - ATTENDING COMMENTS
Dr Peñaloza covering service till 4/27.
Patient has been tentatively added on to the OR schedule for Monday, pending MRI results. Will need medical clearance
Dr Peñaloza covering service till 4/27.

## 2019-04-20 ENCOUNTER — TRANSCRIPTION ENCOUNTER (OUTPATIENT)
Age: 64
End: 2019-04-20

## 2019-04-20 LAB
CRP SERPL-MCNC: 0.78 MG/DL — HIGH (ref 0–0.4)
VANCOMYCIN TROUGH SERPL-MCNC: 24 UG/ML — HIGH (ref 10–20)

## 2019-04-20 PROCEDURE — 99232 SBSQ HOSP IP/OBS MODERATE 35: CPT

## 2019-04-20 RX ADMIN — PIPERACILLIN AND TAZOBACTAM 25 GRAM(S): 4; .5 INJECTION, POWDER, LYOPHILIZED, FOR SOLUTION INTRAVENOUS at 05:35

## 2019-04-20 RX ADMIN — Medication 1 TABLET(S): at 05:35

## 2019-04-20 RX ADMIN — PIPERACILLIN AND TAZOBACTAM 25 GRAM(S): 4; .5 INJECTION, POWDER, LYOPHILIZED, FOR SOLUTION INTRAVENOUS at 13:46

## 2019-04-20 RX ADMIN — PIPERACILLIN AND TAZOBACTAM 25 GRAM(S): 4; .5 INJECTION, POWDER, LYOPHILIZED, FOR SOLUTION INTRAVENOUS at 21:25

## 2019-04-20 RX ADMIN — INSULIN GLARGINE 30 UNIT(S): 100 INJECTION, SOLUTION SUBCUTANEOUS at 07:40

## 2019-04-20 RX ADMIN — OXYCODONE AND ACETAMINOPHEN 1 TABLET(S): 5; 325 TABLET ORAL at 06:30

## 2019-04-20 RX ADMIN — Medication 5 UNIT(S): at 11:05

## 2019-04-20 RX ADMIN — Medication 4: at 15:59

## 2019-04-20 RX ADMIN — Medication 250 MILLIGRAM(S): at 02:12

## 2019-04-20 RX ADMIN — Medication 5 UNIT(S): at 07:39

## 2019-04-20 RX ADMIN — OXYCODONE AND ACETAMINOPHEN 1 TABLET(S): 5; 325 TABLET ORAL at 18:29

## 2019-04-20 RX ADMIN — Medication 1 TABLET(S): at 14:21

## 2019-04-20 RX ADMIN — Medication 1 TABLET(S): at 21:25

## 2019-04-20 RX ADMIN — Medication 250 MILLIGRAM(S): at 09:20

## 2019-04-20 RX ADMIN — Medication 5 UNIT(S): at 15:59

## 2019-04-20 RX ADMIN — Medication 2: at 07:39

## 2019-04-20 RX ADMIN — INSULIN GLARGINE 30 UNIT(S): 100 INJECTION, SOLUTION SUBCUTANEOUS at 21:24

## 2019-04-20 RX ADMIN — Medication 1 APPLICATION(S): at 05:35

## 2019-04-20 RX ADMIN — OXYCODONE AND ACETAMINOPHEN 1 TABLET(S): 5; 325 TABLET ORAL at 05:35

## 2019-04-20 RX ADMIN — Medication 1 APPLICATION(S): at 17:17

## 2019-04-20 RX ADMIN — OXYCODONE AND ACETAMINOPHEN 1 TABLET(S): 5; 325 TABLET ORAL at 17:57

## 2019-04-20 NOTE — DISCHARGE NOTE PROVIDER - HOSPITAL COURSE
65 yo M w/ PMHx  of diabetes presents to the ED for multiple issues.  Describes pain in his left foot and leg which caused him to fall.  Now having pain in his lower back, fell 3 times since yesterday.  Has swelling, blistering and sores of the left foot which he says has been progressing over the last 8 days.  Pt states he saw a podiatrist today who sent him to the hospital.      Pt. admitted with hyperosmolar non ketonic hyperglycemia and foot infection/acute osteomyelitis.  s/p 5th ray toe amputation, margins clear for acute OM, unable to obtain mri due to staple in foot , presumably left in a previous procedure many years ago, pt. is unsure.    wound culture came back coag janet arevalo. As per ID pt. will be sent home on po abx, bactrim DS and cipro for two weeks, but needs close follow-up. Pt. is to follow-up with podiatry in one wek for next dressing change.    Pt. acknowledges that he was non-compliant with insulin. Hyperglycemia in control at discharge and pt. will be sent home with levemir rx.    Pt. to follow-up with his PMD

## 2019-04-20 NOTE — PROGRESS NOTE ADULT - ASSESSMENT
65 yo M w/ PMHx of diabetes presents to the ED complaint of left foot pain.  Describes pain in his left foot and leg which caused him to fall.  Initially complained of back pain in his lower back, but now feels better.  Has swelling, blistering and sores on the left foot which he says has been progressing over the last few months.  Pt states he saw a podiatrist who sent him to the hospital.  Denies fever or chills.  He does admit to stopping his insulin a few months ago and has not been on any diabetic medications since.    Left foot pain, likely Osteomyelitis  New 4 mm linear radiopaque foreign body in the soft tissue between the second and third proximal phalanges in the plantar aspect  s/p partial 5th ray resection (DOS 4/16) - POD #4  On IV Vanco and Zosyn  ID following, REPEAT MRI ORDEREDm   local wound care, awaiting OR cultured for further management     IDDM  Increase Lantus to 30 units BID (patient reports taking Solostar 30 units BID at home)  15 units of premeal     u premeal coverage   slowly improving   SSI coverage  needs follow up with endo on dc   diabetic diet  HgbA1C >15

## 2019-04-20 NOTE — DISCHARGE NOTE PROVIDER - NSDCFUADDAPPT_GEN_ALL_CORE_FT
Podiatry Discharge Instructions:  Follow up: Please follow up with Dr. Barrios within 1 week of discharge from the hospital, please call [phone] for appointment and discuss that you recently were seen in the hospital.  Wound Care: Please leave your dressing clean dry intact until your follow up appointment   Weight bearing: Please weight bear as tolerated in a surgical shoe.  Antibiotics: Please continue as instructed. Podiatry Discharge Instructions:  Follow up: Please follow up with Dr. Barrios within 1 week of discharge from the hospital, please call [phone] for appointment and discuss that you recently were seen in the hospital.  Wound Care: Please leave your dressing clean dry intact until your follow up appointment   Weight bearing: Please weight bear as tolerated in a surgical shoe.  Antibiotics: Please continue as instructed.    follow-up with your primary care doctor within one week

## 2019-04-20 NOTE — DISCHARGE NOTE PROVIDER - CARE PROVIDER_API CALL
Vandana Barrios ([email or web address])  Foot and Ankle Surgery; Podiatric Medicine  [address] [facility] [address], [address]  [place] [address]  Phone: [phone]  Fax: [phone]  Follow Up Time:

## 2019-04-20 NOTE — DISCHARGE NOTE PROVIDER - NSDCCPCAREPLAN_GEN_ALL_CORE_FT
PRINCIPAL DISCHARGE DIAGNOSIS  Diagnosis: Osteomyelitis of foot, left, acute  Assessment and Plan of Treatment: complete course of antibioitcs and follow-up with podiatry      SECONDARY DISCHARGE DIAGNOSES  Diagnosis: Type 2 diabetes mellitus with hyperglycemia, with long-term current use of insulin  Assessment and Plan of Treatment: Type 2 diabetes mellitus with hyperglycemia, with long-term current use of insulin

## 2019-04-21 LAB
ANION GAP SERPL CALC-SCNC: 6 MMOL/L — SIGNIFICANT CHANGE UP (ref 5–17)
ANION GAP SERPL CALC-SCNC: 6 MMOL/L — SIGNIFICANT CHANGE UP (ref 5–17)
BUN SERPL-MCNC: 26 MG/DL — HIGH (ref 7–23)
BUN SERPL-MCNC: 33 MG/DL — HIGH (ref 7–23)
CALCIUM SERPL-MCNC: 9.3 MG/DL — SIGNIFICANT CHANGE UP (ref 8.5–10.1)
CALCIUM SERPL-MCNC: 9.5 MG/DL — SIGNIFICANT CHANGE UP (ref 8.5–10.1)
CHLORIDE SERPL-SCNC: 106 MMOL/L — SIGNIFICANT CHANGE UP (ref 96–108)
CHLORIDE SERPL-SCNC: 107 MMOL/L — SIGNIFICANT CHANGE UP (ref 96–108)
CO2 SERPL-SCNC: 29 MMOL/L — SIGNIFICANT CHANGE UP (ref 22–31)
CO2 SERPL-SCNC: 29 MMOL/L — SIGNIFICANT CHANGE UP (ref 22–31)
CREAT SERPL-MCNC: 0.88 MG/DL — SIGNIFICANT CHANGE UP (ref 0.5–1.3)
CREAT SERPL-MCNC: 0.95 MG/DL — SIGNIFICANT CHANGE UP (ref 0.5–1.3)
GLUCOSE BLDC GLUCOMTR-MCNC: 135 MG/DL — HIGH (ref 70–99)
GLUCOSE BLDC GLUCOMTR-MCNC: 145 MG/DL — HIGH (ref 70–99)
GLUCOSE SERPL-MCNC: 140 MG/DL — HIGH (ref 70–99)
GLUCOSE SERPL-MCNC: 80 MG/DL — SIGNIFICANT CHANGE UP (ref 70–99)
HCT VFR BLD CALC: 34.2 % — LOW (ref 39–50)
HGB BLD-MCNC: 10.8 G/DL — LOW (ref 13–17)
MAGNESIUM SERPL-MCNC: 2.2 MG/DL — SIGNIFICANT CHANGE UP (ref 1.6–2.6)
MCHC RBC-ENTMCNC: 30.7 PG — SIGNIFICANT CHANGE UP (ref 27–34)
MCHC RBC-ENTMCNC: 31.6 GM/DL — LOW (ref 32–36)
MCV RBC AUTO: 97.2 FL — SIGNIFICANT CHANGE UP (ref 80–100)
NRBC # BLD: 0 /100 WBCS — SIGNIFICANT CHANGE UP (ref 0–0)
PHOSPHATE SERPL-MCNC: 3.4 MG/DL — SIGNIFICANT CHANGE UP (ref 2.5–4.5)
PLATELET # BLD AUTO: 314 K/UL — SIGNIFICANT CHANGE UP (ref 150–400)
POTASSIUM SERPL-MCNC: 3.9 MMOL/L — SIGNIFICANT CHANGE UP (ref 3.5–5.3)
POTASSIUM SERPL-MCNC: 4.1 MMOL/L — SIGNIFICANT CHANGE UP (ref 3.5–5.3)
POTASSIUM SERPL-SCNC: 3.9 MMOL/L — SIGNIFICANT CHANGE UP (ref 3.5–5.3)
POTASSIUM SERPL-SCNC: 4.1 MMOL/L — SIGNIFICANT CHANGE UP (ref 3.5–5.3)
RBC # BLD: 3.52 M/UL — LOW (ref 4.2–5.8)
RBC # FLD: 13.8 % — SIGNIFICANT CHANGE UP (ref 10.3–14.5)
SODIUM SERPL-SCNC: 141 MMOL/L — SIGNIFICANT CHANGE UP (ref 135–145)
SODIUM SERPL-SCNC: 142 MMOL/L — SIGNIFICANT CHANGE UP (ref 135–145)
VANCOMYCIN TROUGH SERPL-MCNC: 8.5 UG/ML — LOW (ref 10–20)
WBC # BLD: 4.87 K/UL — SIGNIFICANT CHANGE UP (ref 3.8–10.5)
WBC # FLD AUTO: 4.87 K/UL — SIGNIFICANT CHANGE UP (ref 3.8–10.5)

## 2019-04-21 PROCEDURE — 99233 SBSQ HOSP IP/OBS HIGH 50: CPT

## 2019-04-21 RX ADMIN — Medication 5 UNIT(S): at 08:43

## 2019-04-21 RX ADMIN — Medication 5 UNIT(S): at 17:45

## 2019-04-21 RX ADMIN — Medication 250 MILLIGRAM(S): at 21:17

## 2019-04-21 RX ADMIN — OXYCODONE AND ACETAMINOPHEN 1 TABLET(S): 5; 325 TABLET ORAL at 06:25

## 2019-04-21 RX ADMIN — OXYCODONE AND ACETAMINOPHEN 1 TABLET(S): 5; 325 TABLET ORAL at 05:23

## 2019-04-21 RX ADMIN — Medication 1 APPLICATION(S): at 17:46

## 2019-04-21 RX ADMIN — OXYCODONE AND ACETAMINOPHEN 1 TABLET(S): 5; 325 TABLET ORAL at 14:02

## 2019-04-21 RX ADMIN — Medication 1 TABLET(S): at 13:13

## 2019-04-21 RX ADMIN — PIPERACILLIN AND TAZOBACTAM 25 GRAM(S): 4; .5 INJECTION, POWDER, LYOPHILIZED, FOR SOLUTION INTRAVENOUS at 21:17

## 2019-04-21 RX ADMIN — INSULIN GLARGINE 30 UNIT(S): 100 INJECTION, SOLUTION SUBCUTANEOUS at 08:47

## 2019-04-21 RX ADMIN — INSULIN GLARGINE 30 UNIT(S): 100 INJECTION, SOLUTION SUBCUTANEOUS at 21:16

## 2019-04-21 RX ADMIN — OXYCODONE AND ACETAMINOPHEN 1 TABLET(S): 5; 325 TABLET ORAL at 22:22

## 2019-04-21 RX ADMIN — Medication 2: at 08:43

## 2019-04-21 RX ADMIN — Medication 1 APPLICATION(S): at 05:24

## 2019-04-21 RX ADMIN — Medication 5 UNIT(S): at 12:36

## 2019-04-21 RX ADMIN — OXYCODONE AND ACETAMINOPHEN 1 TABLET(S): 5; 325 TABLET ORAL at 21:22

## 2019-04-21 RX ADMIN — Medication 1 TABLET(S): at 05:23

## 2019-04-21 RX ADMIN — OXYCODONE AND ACETAMINOPHEN 1 TABLET(S): 5; 325 TABLET ORAL at 13:13

## 2019-04-21 RX ADMIN — Medication 250 MILLIGRAM(S): at 12:36

## 2019-04-21 RX ADMIN — Medication 1 TABLET(S): at 21:17

## 2019-04-21 RX ADMIN — PIPERACILLIN AND TAZOBACTAM 25 GRAM(S): 4; .5 INJECTION, POWDER, LYOPHILIZED, FOR SOLUTION INTRAVENOUS at 13:13

## 2019-04-21 RX ADMIN — PIPERACILLIN AND TAZOBACTAM 25 GRAM(S): 4; .5 INJECTION, POWDER, LYOPHILIZED, FOR SOLUTION INTRAVENOUS at 05:23

## 2019-04-21 NOTE — PROGRESS NOTE ADULT - ASSESSMENT
63 yo M w/ PMHx of diabetes presents to the ED complaint of left foot pain.  Describes pain in his left foot and leg which caused him to fall.  Initially complained of back pain in his lower back, but now feels better.  Has swelling, blistering and sores on the left foot which he says has been progressing over the last few months.  Pt states he saw a podiatrist who sent him to the hospital.  Denies fever or chills.  He does admit to stopping his insulin a few months ago and has not been on any diabetic medications since.    Left foot pain, likely Osteomyelitis  New 4 mm linear radiopaque foreign body in the soft tissue between the second and third proximal phalanges in the plantar aspect  s/p partial 5th ray resection (DOS 4/16) - POD #5  On IV Vanco and Zosyn  ID following, REPEAT MRI ORDERED   local wound care, awaiting OR cultured for further management   Vanco level low  Contineu Vanco and repeat trough in AM    IDDM  Contineu Lantus  30 units BID (patient reports taking Solostar 30 units BID at home)  15 units of premeal     u premeal coverage   slowly improving   SSI coverage  needs follow up with endo on dc   diabetic diet  HgbA1C >15

## 2019-04-22 DIAGNOSIS — E55.9 VITAMIN D DEFICIENCY, UNSPECIFIED: ICD-10-CM

## 2019-04-22 DIAGNOSIS — D64.9 ANEMIA, UNSPECIFIED: ICD-10-CM

## 2019-04-22 DIAGNOSIS — E11.65 TYPE 2 DIABETES MELLITUS WITH HYPERGLYCEMIA: ICD-10-CM

## 2019-04-22 LAB
ANION GAP SERPL CALC-SCNC: 5 MMOL/L — SIGNIFICANT CHANGE UP (ref 5–17)
BUN SERPL-MCNC: 30 MG/DL — HIGH (ref 7–23)
CALCIUM SERPL-MCNC: 9.5 MG/DL — SIGNIFICANT CHANGE UP (ref 8.5–10.1)
CHLORIDE SERPL-SCNC: 110 MMOL/L — HIGH (ref 96–108)
CO2 SERPL-SCNC: 27 MMOL/L — SIGNIFICANT CHANGE UP (ref 22–31)
CREAT SERPL-MCNC: 0.75 MG/DL — SIGNIFICANT CHANGE UP (ref 0.5–1.3)
GLUCOSE BLDC GLUCOMTR-MCNC: 116 MG/DL — HIGH (ref 70–99)
GLUCOSE BLDC GLUCOMTR-MCNC: 131 MG/DL — HIGH (ref 70–99)
GLUCOSE BLDC GLUCOMTR-MCNC: 146 MG/DL — HIGH (ref 70–99)
GLUCOSE BLDC GLUCOMTR-MCNC: 216 MG/DL — HIGH (ref 70–99)
GLUCOSE SERPL-MCNC: 70 MG/DL — SIGNIFICANT CHANGE UP (ref 70–99)
HCT VFR BLD CALC: 34.8 % — LOW (ref 39–50)
HGB BLD-MCNC: 10.8 G/DL — LOW (ref 13–17)
MCHC RBC-ENTMCNC: 29.8 PG — SIGNIFICANT CHANGE UP (ref 27–34)
MCHC RBC-ENTMCNC: 31 GM/DL — LOW (ref 32–36)
MCV RBC AUTO: 96.1 FL — SIGNIFICANT CHANGE UP (ref 80–100)
NRBC # BLD: 0 /100 WBCS — SIGNIFICANT CHANGE UP (ref 0–0)
PLATELET # BLD AUTO: 343 K/UL — SIGNIFICANT CHANGE UP (ref 150–400)
POTASSIUM SERPL-MCNC: 3.8 MMOL/L — SIGNIFICANT CHANGE UP (ref 3.5–5.3)
POTASSIUM SERPL-SCNC: 3.8 MMOL/L — SIGNIFICANT CHANGE UP (ref 3.5–5.3)
RBC # BLD: 3.62 M/UL — LOW (ref 4.2–5.8)
RBC # FLD: 13.6 % — SIGNIFICANT CHANGE UP (ref 10.3–14.5)
SODIUM SERPL-SCNC: 142 MMOL/L — SIGNIFICANT CHANGE UP (ref 135–145)
VANCOMYCIN TROUGH SERPL-MCNC: 14.5 UG/ML — SIGNIFICANT CHANGE UP (ref 10–20)
WBC # BLD: 5.56 K/UL — SIGNIFICANT CHANGE UP (ref 3.8–10.5)
WBC # FLD AUTO: 5.56 K/UL — SIGNIFICANT CHANGE UP (ref 3.8–10.5)

## 2019-04-22 PROCEDURE — 99233 SBSQ HOSP IP/OBS HIGH 50: CPT

## 2019-04-22 RX ORDER — CIPROFLOXACIN LACTATE 400MG/40ML
500 VIAL (ML) INTRAVENOUS EVERY 12 HOURS
Qty: 0 | Refills: 0 | Status: DISCONTINUED | OUTPATIENT
Start: 2019-04-22 | End: 2019-04-22

## 2019-04-22 RX ORDER — PIPERACILLIN AND TAZOBACTAM 4; .5 G/20ML; G/20ML
3.38 INJECTION, POWDER, LYOPHILIZED, FOR SOLUTION INTRAVENOUS EVERY 8 HOURS
Qty: 0 | Refills: 0 | Status: DISCONTINUED | OUTPATIENT
Start: 2019-04-22 | End: 2019-04-23

## 2019-04-22 RX ORDER — VANCOMYCIN HCL 1 G
1000 VIAL (EA) INTRAVENOUS EVERY 8 HOURS
Qty: 0 | Refills: 0 | Status: DISCONTINUED | OUTPATIENT
Start: 2019-04-22 | End: 2019-04-23

## 2019-04-22 RX ORDER — CHOLECALCIFEROL (VITAMIN D3) 125 MCG
1000 CAPSULE ORAL DAILY
Qty: 0 | Refills: 0 | Status: DISCONTINUED | OUTPATIENT
Start: 2019-04-22 | End: 2019-04-23

## 2019-04-22 RX ADMIN — PIPERACILLIN AND TAZOBACTAM 25 GRAM(S): 4; .5 INJECTION, POWDER, LYOPHILIZED, FOR SOLUTION INTRAVENOUS at 05:43

## 2019-04-22 RX ADMIN — OXYCODONE AND ACETAMINOPHEN 1 TABLET(S): 5; 325 TABLET ORAL at 23:45

## 2019-04-22 RX ADMIN — Medication 1 APPLICATION(S): at 16:59

## 2019-04-22 RX ADMIN — Medication 250 MILLIGRAM(S): at 22:36

## 2019-04-22 RX ADMIN — PIPERACILLIN AND TAZOBACTAM 25 GRAM(S): 4; .5 INJECTION, POWDER, LYOPHILIZED, FOR SOLUTION INTRAVENOUS at 22:35

## 2019-04-22 RX ADMIN — Medication 1 TABLET(S): at 22:35

## 2019-04-22 RX ADMIN — PIPERACILLIN AND TAZOBACTAM 25 GRAM(S): 4; .5 INJECTION, POWDER, LYOPHILIZED, FOR SOLUTION INTRAVENOUS at 13:28

## 2019-04-22 RX ADMIN — Medication 250 MILLIGRAM(S): at 05:43

## 2019-04-22 RX ADMIN — Medication 5 UNIT(S): at 12:02

## 2019-04-22 RX ADMIN — OXYCODONE AND ACETAMINOPHEN 1 TABLET(S): 5; 325 TABLET ORAL at 14:00

## 2019-04-22 RX ADMIN — Medication 1 APPLICATION(S): at 05:43

## 2019-04-22 RX ADMIN — INSULIN GLARGINE 30 UNIT(S): 100 INJECTION, SOLUTION SUBCUTANEOUS at 22:36

## 2019-04-22 RX ADMIN — Medication 250 MILLIGRAM(S): at 12:07

## 2019-04-22 RX ADMIN — OXYCODONE AND ACETAMINOPHEN 1 TABLET(S): 5; 325 TABLET ORAL at 22:46

## 2019-04-22 RX ADMIN — INSULIN GLARGINE 30 UNIT(S): 100 INJECTION, SOLUTION SUBCUTANEOUS at 09:12

## 2019-04-22 RX ADMIN — OXYCODONE AND ACETAMINOPHEN 1 TABLET(S): 5; 325 TABLET ORAL at 13:34

## 2019-04-22 RX ADMIN — Medication 1 TABLET(S): at 13:28

## 2019-04-22 RX ADMIN — Medication 5 UNIT(S): at 09:12

## 2019-04-22 RX ADMIN — Medication 1 TABLET(S): at 05:43

## 2019-04-22 RX ADMIN — Medication 1000 UNIT(S): at 17:26

## 2019-04-22 RX ADMIN — Medication 5 UNIT(S): at 16:59

## 2019-04-22 NOTE — PROGRESS NOTE ADULT - REASON FOR ADMISSION
left foot pain
foot wound
left foot infection
left foot wound
foot infevtion
left foot pain
left foot pain
foot infection
left foot pain
foot osteomyelitis
left foot infection
diabetic foot infection

## 2019-04-22 NOTE — PROGRESS NOTE ADULT - PROBLEM SELECTOR PROBLEM 2
Diabetes
Type 2 diabetes mellitus with other skin complication
Type 2 diabetes mellitus with hyperglycemia, with long-term current use of insulin

## 2019-04-22 NOTE — PROGRESS NOTE ADULT - ASSESSMENT
65 yo M w/ PMHx of diabetes presents to the ED complaint of left foot pain.  Describes pain in his left foot and leg which caused him to fall.  Initially complained of back pain in his lower back, but now feels better.  Has swelling, blistering and sores on the left foot which he says has been progressing over the last few months.  Pt states he saw a podiatrist who sent him to the hospital.  Denies fever or chills.  He does admit to stopping his insulin a few months ago and has not been on any diabetic medications since.

## 2019-04-22 NOTE — PROGRESS NOTE ADULT - PROVIDER SPECIALTY LIST ADULT
Hospitalist
Infectious Disease
Podiatry
Hospitalist

## 2019-04-22 NOTE — PROGRESS NOTE ADULT - SUBJECTIVE AND OBJECTIVE BOX
Patient is a 64y old  Male who presents with a chief complaint of left foot pain    INTERVAL HPI/OVERNIGHT EVENTS: Reports no complaints.    MEDICATIONS  (STANDING):  dextrose 5%. 1000 milliLiter(s) (50 mL/Hr) IV Continuous <Continuous>  dextrose 50% Injectable 12.5 Gram(s) IV Push once  dextrose 50% Injectable 25 Gram(s) IV Push once  dextrose 50% Injectable 25 Gram(s) IV Push once  insulin glargine Injectable (LANTUS) 10 Unit(s) SubCutaneous two times a day  insulin lispro (HumaLOG) corrective regimen sliding scale   SubCutaneous three times a day before meals  insulin lispro (HumaLOG) corrective regimen sliding scale   SubCutaneous at bedtime  piperacillin/tazobactam IVPB. 3.375 Gram(s) IV Intermittent every 8 hours  vancomycin  IVPB 1000 milliGRAM(s) IV Intermittent every 12 hours    MEDICATIONS  (PRN):  acetaminophen   Tablet .. 650 milliGRAM(s) Oral every 6 hours PRN Mild Pain (1 - 3)  dextrose 40% Gel 15 Gram(s) Oral once PRN Blood Glucose LESS THAN 70 milliGRAM(s)/deciliter  glucagon  Injectable 1 milliGRAM(s) IntraMuscular once PRN Glucose LESS THAN 70 milligrams/deciliter    Allergies:  bananas (Rash)  Cauliflower (Other)  No Known Drug Allergies    REVIEW OF SYSTEMS:  All other systems reviewed and are negative    Vital Signs Last 24 Hrs  T(C): 36.2 (2019 04:33), Max: 36.7 (2019 00:06)  T(F): 97.2 (2019 04:33), Max: 98.1 (2019 00:06)  HR: 74 (2019 04:33) (74 - 94)  BP: 97/52 (2019 04:33) (97/52 - 116/65)  BP(mean): --  RR: 16 (2019 04:33) (16 - 18)  SpO2: 98% (2019 04:33) (98% - 100%)  Daily Height in cm: 193.04 (2019 20:15)    Daily Weight in k.8 (2019 04:33)  I&O's Summary    CAPILLARY BLOOD GLUCOSE      POCT Blood Glucose.: 303 mg/dL (2019 07:33)  POCT Blood Glucose.: 311 mg/dL (2019 02:14)  POCT Blood Glucose.: 401 mg/dL (2019 22:58)  POCT Blood Glucose.: 401 mg/dL (2019 21:35)  POCT Blood Glucose.: 371 mg/dL (2019 16:24)  POCT Blood Glucose.: 357 mg/dL (2019 16:22)    PHYSICAL EXAM:  GENERAL: NAD, well-groomed, well-developed  HEAD:  Atraumatic, Normocephalic  EYES: EOMI, PERRLA, conjunctiva and sclera clear  ENMT: No tonsillar erythema, exudates, or enlargement; Moist mucous membranes, Good dentition, No lesions  NECK: Supple, No JVD, Normal thyroid  NERVOUS SYSTEM:  Alert & Oriented X3, Good concentration; Motor Strength 5/5 B/L upper and lower extremities; DTRs 2+ intact and symmetric  CHEST/LUNG: Clear to percussion bilaterally; No rales, rhonchi, wheezing, or rubs  HEART: Regular rate and rhythm; No murmurs, rubs, or gallops  ABDOMEN: Soft, Nontender, Nondistended; Bowel sounds present  EXTREMITIES:  2+ Peripheral Pulses, No clubbing, cyanosis, or edema, left plantar 5th met head with 2.3 x 3.2 cm wound, Depth: 1.8 cm (to the dorsal foot) also probes to bone  LYMPH: No lymphadenopathy noted  SKIN: No rashes or lesions    Labs                      10.4   6.68  )-----------( 205      ( 2019 07:58 )             32.5     04-12    137  |  103  |  17  ----------------------------<  308<H>  3.8   |  26  |  0.86    Ca    8.9      2019 07:58    TPro  8.3  /  Alb  2.8<L>  /  TBili  0.3  /  DBili  x   /  AST  9<L>  /  ALT  12  /  AlkPhos  99  04-11    PT/INR - ( 2019 15:40 )   PT: 11.0 sec;   INR: 0.98 ratio    PTT - ( 2019 15:40 )  PTT:28.9 sec    < from: Xray Foot AP + Lateral + Oblique, Left (19 @ 14:51) >  INTERPRETATION:  Left foot x-rays    Indication: Left foot wound.    3 views of the left foot are compared to previous admission dated   2016.    Impression: No evidence for an acute fracture or dislocation.    The joint spaces are preserved.    The osseous mineralization is within normal limits.    Stable calcaneal spur.    New 4 mm linear radiopaque foreign body in the soft tissue between the   second and third proximal phalanges in the plantar aspect. This finding   is communicated with the emergency department via the PACS communication   system.      DVT prophylaxis:  low risk
Patient is a 64y old  Male who presents with a chief complaint of left foot wound (12 Apr 2019 16:54)      INTERVAL HPI/OVERNIGHT EVENTS:  none. pain controlled     MEDICATIONS  (STANDING):  dextrose 5%. 1000 milliLiter(s) (50 mL/Hr) IV Continuous <Continuous>  dextrose 50% Injectable 12.5 Gram(s) IV Push once  dextrose 50% Injectable 25 Gram(s) IV Push once  dextrose 50% Injectable 25 Gram(s) IV Push once  insulin glargine Injectable (LANTUS) 30 Unit(s) SubCutaneous two times a day  insulin lispro (HumaLOG) corrective regimen sliding scale   SubCutaneous three times a day before meals  insulin lispro (HumaLOG) corrective regimen sliding scale   SubCutaneous at bedtime  insulin lispro Injectable (HumaLOG) 5 Unit(s) SubCutaneous three times a day before meals  piperacillin/tazobactam IVPB. 3.375 Gram(s) IV Intermittent every 8 hours  vancomycin  IVPB 1000 milliGRAM(s) IV Intermittent every 12 hours    MEDICATIONS  (PRN):  acetaminophen   Tablet .. 650 milliGRAM(s) Oral every 6 hours PRN Mild Pain (1 - 3)  dextrose 40% Gel 15 Gram(s) Oral once PRN Blood Glucose LESS THAN 70 milliGRAM(s)/deciliter  glucagon  Injectable 1 milliGRAM(s) IntraMuscular once PRN Glucose LESS THAN 70 milligrams/deciliter      Allergies    bananas (Rash)  Cauliflower (Other)  No Known Drug Allergies    Intolerances      Vital Signs Last 24 Hrs  T(C): 36.3 (13 Apr 2019 05:34), Max: 36.9 (12 Apr 2019 18:15)  T(F): 97.3 (13 Apr 2019 05:34), Max: 98.4 (12 Apr 2019 18:15)  HR: 70 (13 Apr 2019 05:34) (56 - 85)  BP: 103/49 (13 Apr 2019 05:34) (103/49 - 119/63)  BP(mean): --  RR: 16 (13 Apr 2019 05:34) (16 - 17)  SpO2: 97% (13 Apr 2019 05:34) (97% - 99%)    PHYSICAL EXAM:  GENERAL: NAD, well-groomed, well-developed  HEAD:  Atraumatic, Normocephalic  EYES: EOMI, PERRLA, conjunctiva and sclera clear  ENMT: No tonsillar erythema, exudates, or enlargement; Moist mucous membranes, Good dentition, No lesions  NECK: Supple, No JVD, Normal thyroid  NERVOUS SYSTEM:  Alert & Oriented X3, Good concentration; Motor Strength 5/5 B/L upper and lower extremities; DTRs 2+ intact and symmetric  CHEST/LUNG: Clear to percussion bilaterally; No rales, rhonchi, wheezing, or rubs  HEART: Regular rate and rhythm; No murmurs, rubs, or gallops  ABDOMEN: Soft, Nontender, Nondistended; Bowel sounds present  EXTREMITIES:  2+ Peripheral Pulses, No clubbing, cyanosis, or edema. foot bandaged- no surrounding cellulites   LYMPH: No lymphadenopathy noted  SKIN: No rashes or lesions    LABS:                        11.2   6.10  )-----------( 232      ( 13 Apr 2019 07:04 )             35.4     04-13    138  |  104  |  25<H>  ----------------------------<  328<H>  4.2   |  26  |  1.01    Ca    9.4      13 Apr 2019 07:04    TPro  8.3  /  Alb  2.8<L>  /  TBili  0.3  /  DBili  x   /  AST  9<L>  /  ALT  12  /  AlkPhos  99  04-11    PT/INR - ( 11 Apr 2019 15:40 )   PT: 11.0 sec;   INR: 0.98 ratio         PTT - ( 11 Apr 2019 15:40 )  PTT:28.9 sec    CAPILLARY BLOOD GLUCOSE      POCT Blood Glucose.: 215 mg/dL (13 Apr 2019 12:23)  POCT Blood Glucose.: 363 mg/dL (13 Apr 2019 07:24)  POCT Blood Glucose.: 372 mg/dL (12 Apr 2019 22:07)  POCT Blood Glucose.: 288 mg/dL (12 Apr 2019 15:44)      RADIOLOGY & ADDITIONAL TESTS:    Imaging Personally Reviewed:  [ ] YES  [ ] NO    Consultant(s) Notes Reviewed:  [ ] YES  [ ] NO    Care Discussed with Consultants/Other Providers [ ] YES  [ ] NO
CHIEF COMPLAINT/INTERVAL HISTORY:    Patient is a 64y old  Male who presents with a chief complaint of foot infevtion (19 Apr 2019 15:57)      HPI:  63 yo M w/ PMHx  of diabetes presents to the ED for multiple issues.  Describes pain in his left foot and leg which caused him to fall.  Now having pain in his lower back, fell 3 times since yesterday.  Has swelling, blistering and sores of the left foot which he says has been progressing over the last 8 days.  Pt states he saw a podiatrist today who sent him to the hospital.  Pt denies NVFC or SOB. (11 Apr 2019 17:44)    Overnight issues  No fever Chills  no chest pain, SOB   No headache   No dizziness        SUBJECTIVE & OBJECTIVE: Pt seen and examined at bedside.   ROS:  CONSTITUTIONAL: No fever, weight loss, or fatigue  EYES: No eye pain, visual disturbances, or discharge  ENMT:  No difficulty hearing, tinnitus, vertigo; No sinus or throat pain  NECK: No pain or stiffness  RESPIRATORY: No cough, wheezing, chills or hemoptysis; No shortness of breath  CARDIOVASCULAR: No chest pain, palpitations, dizziness, or leg swelling  GASTROINTESTINAL: No abdominal or epigastric pain. No nausea, vomiting, or hematemesis; No diarrhea or constipation. No melena or hematochezia.  GENITOURINARY: No dysuria, frequency, hematuria, or incontinence  NEUROLOGICAL: No headaches, memory loss, loss of strength, numbness, or tremors  SKIN: No itching, burning, rashes, or lesions   LYMPH NODES: No enlarged glands  ENDOCRINE: No heat or cold intolerance; No hair loss  MUSCULOSKELETAL: No joint pain or swelling; No muscle, back, or extremity pain  PSYCHIATRIC: No depression, anxiety, mood swings, or difficulty sleeping  HEME/LYMPH: No easy bruising, or bleeding gums  ALLERGY AND IMMUNOLOGIC: No hives or eczema  ICU Vital Signs Last 24 Hrs  T(C): 36.4 (21 Apr 2019 11:41), Max: 36.6 (20 Apr 2019 16:55)  T(F): 97.6 (21 Apr 2019 11:41), Max: 97.9 (20 Apr 2019 16:55)  HR: 69 (21 Apr 2019 11:41) (69 - 74)  BP: 90/40 (21 Apr 2019 11:41) (90/40 - 114/61)  BP(mean): --  ABP: --  ABP(mean): --  RR: 16 (21 Apr 2019 11:41) (16 - 19)  SpO2: 99% (21 Apr 2019 11:41) (98% - 99%)        MEDICATIONS  (STANDING):  AQUAPHOR (petrolatum Ointment) 1 Application(s) Topical two times a day  dextrose 5% + sodium chloride 0.9%. 1000 milliLiter(s) (50 mL/Hr) IV Continuous <Continuous>  dextrose 5%. 1000 milliLiter(s) (50 mL/Hr) IV Continuous <Continuous>  dextrose 50% Injectable 12.5 Gram(s) IV Push once  dextrose 50% Injectable 25 Gram(s) IV Push once  dextrose 50% Injectable 25 Gram(s) IV Push once  insulin glargine Injectable (LANTUS) 30 Unit(s) SubCutaneous two times a day  insulin lispro (HumaLOG) corrective regimen sliding scale   SubCutaneous three times a day before meals  insulin lispro (HumaLOG) corrective regimen sliding scale   SubCutaneous at bedtime  insulin lispro Injectable (HumaLOG) 5 Unit(s) SubCutaneous three times a day before meals  lactobacillus acidophilus 1 Tablet(s) Oral three times a day  piperacillin/tazobactam IVPB. 3.375 Gram(s) IV Intermittent every 8 hours  sodium chloride 0.9%. 1000 milliLiter(s) (40 mL/Hr) IV Continuous <Continuous>  vancomycin  IVPB 1000 milliGRAM(s) IV Intermittent every 8 hours    MEDICATIONS  (PRN):  acetaminophen   Tablet .. 650 milliGRAM(s) Oral every 6 hours PRN Mild Pain (1 - 3)  dextrose 40% Gel 15 Gram(s) Oral once PRN Blood Glucose LESS THAN 70 milliGRAM(s)/deciliter  glucagon  Injectable 1 milliGRAM(s) IntraMuscular once PRN Glucose LESS THAN 70 milligrams/deciliter  oxyCODONE    5 mG/acetaminophen 325 mG 1 Tablet(s) Oral every 4 hours PRN Moderate Pain (4 - 6)        PHYSICAL EXAM:    GENERAL: NAD, well-groomed, well-developed  HEAD:  Atraumatic, Normocephalic  EYES: EOMI, PERRLA, conjunctiva and sclera clear  ENMT: Moist mucous membranes  NECK: Supple, No JVD  NERVOUS SYSTEM:  Alert & Oriented X3, Moving all 4 limbs  CHEST/LUNG: Clear to auscultation bilaterally; No rales, rhonchi, wheezing, or rubs  HEART: Regular rate and rhythm; No murmurs, rubs, or gallops  ABDOMEN: Soft, Nontender, Nondistended; Bowel sounds present  EXTREMITIES:  2+ Peripheral Pulses, No clubbing, cyanosis, or edema, left LE covered with dressing    LABS:                        10.8   4.87  )-----------( 314      ( 21 Apr 2019 10:32 )             34.2     04-21    142  |  107  |  26<H>  ----------------------------<  80  3.9   |  29  |  0.88    Ca    9.5      21 Apr 2019 10:32  Phos  3.4     04-21  Mg     2.2     04-21            CAPILLARY BLOOD GLUCOSE      POCT Blood Glucose.: 130 mg/dL (21 Apr 2019 11:40)  POCT Blood Glucose.: 196 mg/dL (21 Apr 2019 08:14)  POCT Blood Glucose.: 157 mg/dL (20 Apr 2019 21:23)  POCT Blood Glucose.: 204 mg/dL (20 Apr 2019 15:41)      RECENT CULTURES:      RADIOLOGY & ADDITIONAL TESTS:  Imaging Personally Reviewed:  [ ] YES      Consultant(s) Notes Reviewed:  [ ] YES     Care Discussed with [ ] Consultants [X ] Patient [ ] Family  [x ]    [x ]  Other; RN  HEALTH ISSUES - PROBLEM Dx:  Arm swelling: Arm swelling  Type 2 diabetes mellitus with other skin complication: Type 2 diabetes mellitus with other skin complication  Diabetes: Diabetes  Osteomyelitis of foot, left, acute: Osteomyelitis of foot, left, acute        DVT/GI ppx  Discussed with pt @ bedside
HPI:  63 yo M w/ PMHx  of diabetes presents to the ED for multiple issues.  Describes pain in his left foot and leg which caused him to fall.  Now having pain in his lower back, fell 3 times since yesterday.  Has swelling, blistering and sores of the left foot which he says has been progressing over the last 8 days.  Pt states he saw a podiatrist april 11  who sent him to the hospital.   all events noted     Allergies    bananas (Rash)  Cauliflower (Other)  No Known Drug Allergies    Intolerances        MEDICATIONS  (STANDING):  dextrose 5% + sodium chloride 0.9%. 1000 milliLiter(s) (50 mL/Hr) IV Continuous <Continuous>  dextrose 5%. 1000 milliLiter(s) (50 mL/Hr) IV Continuous <Continuous>  dextrose 50% Injectable 12.5 Gram(s) IV Push once  dextrose 50% Injectable 25 Gram(s) IV Push once  dextrose 50% Injectable 25 Gram(s) IV Push once  insulin glargine Injectable (LANTUS) 30 Unit(s) SubCutaneous two times a day  insulin lispro (HumaLOG) corrective regimen sliding scale   SubCutaneous three times a day before meals  insulin lispro (HumaLOG) corrective regimen sliding scale   SubCutaneous at bedtime  insulin lispro Injectable (HumaLOG) 5 Unit(s) SubCutaneous three times a day before meals  lactobacillus acidophilus 1 Tablet(s) Oral three times a day  piperacillin/tazobactam IVPB. 3.375 Gram(s) IV Intermittent every 8 hours  sodium chloride 0.9%. 1000 milliLiter(s) (40 mL/Hr) IV Continuous <Continuous>  vancomycin  IVPB 1000 milliGRAM(s) IV Intermittent every 8 hours    MEDICATIONS  (PRN):  acetaminophen   Tablet .. 650 milliGRAM(s) Oral every 6 hours PRN Mild Pain (1 - 3)  dextrose 40% Gel 15 Gram(s) Oral once PRN Blood Glucose LESS THAN 70 milliGRAM(s)/deciliter  glucagon  Injectable 1 milliGRAM(s) IntraMuscular once PRN Glucose LESS THAN 70 milligrams/deciliter  oxyCODONE    5 mG/acetaminophen 325 mG 1 Tablet(s) Oral every 4 hours PRN Moderate Pain (4 - 6)      REVIEW OF SYSTEMS:    CONSTITUTIONAL: No fever, chills, weight loss, or fatigue  HEENT: No sore throat, runny nose, ear ache  RESPIRATORY: No cough, wheezing, No shortness of breath  CARDIOVASCULAR: No chest pain, palpitations, dizziness  GASTROINTESTINAL: No abdominal pain. No nausea, vomiting, diarrhea  GENITOURINARY: No dysuria, increase frequency, hematuria, or incontinence  NEUROLOGICAL: No headaches, memory loss, loss of strength, numbness, or tremors, no weakness  EXTREMITY: No pedal edema BLE  SKIN: No itching, burning, rashes, or lesions     VITAL SIGNS:  T(C): 36.5 (04-19-19 @ 11:00), Max: 36.5 (04-19-19 @ 11:00)  T(F): 97.7 (04-19-19 @ 11:00), Max: 97.7 (04-19-19 @ 11:00)  HR: 70 (04-19-19 @ 11:00) (64 - 91)  BP: 99/55 (04-19-19 @ 11:00) (99/55 - 113/63)  RR: 16 (04-19-19 @ 11:00) (16 - 18)  SpO2: 99% (04-19-19 @ 11:00) (96% - 100%)  Wt(kg): --    PHYSICAL EXAM:    GENERAL: not in any distress  HEENT: Neck is supple, normocephalic, atraumatic   CHEST/LUNG: Clear to auscultation bilaterally; No rales, rhonchi, wheezing  HEART: Regular rate and rhythm; No murmurs, rubs, or gallops  ABDOMEN: Soft, Nontender, Nondistended; Bowel sounds present, no rebound   EXTREMITIES:  2+ Peripheral Pulses, No clubbing, cyanosis, or edema  foot as below   BACK: no pressor sore   NERVOUS SYSTEM:  Alert & Oriented X3, Good concentration  PSYCH: normal affect   Vascular: DP/PT 2/4, B/L, CFT <3 seconds B/L, Temperature gradient warm,, B/L. (L dorsolateral foot warmer)  Neuro:  sensation diminished to b/l feet  Musculoskeletal/Ortho: no gross deformtiies  Skin:  Wound #1: plantar 5th met head  Size: 2.3 x 3.2 cm   Depth: 1.8 cm (to the dorsal foot) also probes to bone  Wound bed: fibrotic and liquefactive  Drainage: purulent  Odor: no  Periwound: hyperkeratotic  Etiology: pressure    LABS:                         10.6   4.31  )-----------( 278      ( 19 Apr 2019 07:17 )             33.4     04-19    144  |  110<H>  |  27<H>  ----------------------------<  155<H>  3.9   |  28  |  0.82    Ca    8.8      19 Apr 2019 07:17                          Vancomycin Level, Trough: 14.7 ug/mL (04-17 @ 15:43)        Culture Results:   No growth (04-17 @ 12:13)      admission culture   providencia   group B strep ;;cns ;; b fragilis           Radiology:  < from: Xray Foot AP + Lateral + Oblique, Left (04.17.19 @ 10:45) >  FINDINGS:  There has been partial resection of the 5th toe beyond the neck of the   fifth metatarsal. There is a sharp resected bony margin. The amputation   bed is well-maintained.  No new osteomyelitic erosions.  Overlying bandagingin situ.  No tracking of soft tissue air.    IMPRESSION:  S/P partial resection of the left th toe, as above.                BERTA PURCELL M.D., ATTENDING RADIOLOGIST  This document has been electronically signed. Apr 17 2019  1:23PM                < end of copied text >
HPI:  63 yo M w/ PMHx  of diabetes presents to the ED for multiple issues.  Describes pain in his left foot and leg which caused him to fall.  Now having pain in his lower back, fell 3 times since yesterday.  Has swelling, blistering and sores of the left foot which he says has been progressing over the last 8 days.  Pt states he saw a podiatrist today who sent him to the hospital.  Pt denies NVFC or SOB. (11 Apr 2019 17:44)  sp sx   couldnt do mri? metal    Allergies    bananas (Rash)  Cauliflower (Other)  No Known Drug Allergies    Intolerances        MEDICATIONS  (STANDING):  AQUAPHOR (petrolatum Ointment) 1 Application(s) Topical two times a day  cholecalciferol 1000 Unit(s) Oral daily  dextrose 5% + sodium chloride 0.9%. 1000 milliLiter(s) (50 mL/Hr) IV Continuous <Continuous>  dextrose 5%. 1000 milliLiter(s) (50 mL/Hr) IV Continuous <Continuous>  dextrose 50% Injectable 12.5 Gram(s) IV Push once  dextrose 50% Injectable 25 Gram(s) IV Push once  dextrose 50% Injectable 25 Gram(s) IV Push once  insulin glargine Injectable (LANTUS) 30 Unit(s) SubCutaneous two times a day  insulin lispro (HumaLOG) corrective regimen sliding scale   SubCutaneous three times a day before meals  insulin lispro (HumaLOG) corrective regimen sliding scale   SubCutaneous at bedtime  insulin lispro Injectable (HumaLOG) 5 Unit(s) SubCutaneous three times a day before meals  lactobacillus acidophilus 1 Tablet(s) Oral three times a day  piperacillin/tazobactam IVPB. 3.375 Gram(s) IV Intermittent every 8 hours  vancomycin  IVPB 1000 milliGRAM(s) IV Intermittent every 8 hours    MEDICATIONS  (PRN):  acetaminophen   Tablet .. 650 milliGRAM(s) Oral every 6 hours PRN Mild Pain (1 - 3)  dextrose 40% Gel 15 Gram(s) Oral once PRN Blood Glucose LESS THAN 70 milliGRAM(s)/deciliter  glucagon  Injectable 1 milliGRAM(s) IntraMuscular once PRN Glucose LESS THAN 70 milligrams/deciliter  oxyCODONE    5 mG/acetaminophen 325 mG 1 Tablet(s) Oral every 4 hours PRN Moderate Pain (4 - 6)      REVIEW OF SYSTEMS:  feels fine   wants to go home   his primary care physician will take care of this  feels fine walking around     VITAL SIGNS:  T(C): 36.5 (04-22-19 @ 11:31), Max: 36.5 (04-22-19 @ 11:31)  T(F): 97.7 (04-22-19 @ 11:31), Max: 97.7 (04-22-19 @ 11:31)  HR: 84 (04-22-19 @ 14:40) (52 - 94)  BP: 120/71 (04-22-19 @ 14:40) (99/66 - 120/71)  RR: 18 (04-22-19 @ 14:40) (16 - 18)  SpO2: 100% (04-22-19 @ 14:40) (97% - 100%)  Wt(kg): --    PHYSICAL EXAM:    GENERAL: not in any distress  HEENT: Neck is supple, normocephalic, atraumatic   CHEST/LUNG: Clear to auscultation bilaterally; No rales, rhonchi, wheezing  HEART: Regular rate and rhythm; No murmurs, rubs, or gallops  ABDOMEN: Soft, Nontender, Nondistended; Bowel sounds present, no rebound   EXTREMITIES:  2+ Peripheral Pulses, No clubbing, cyanosis,   foot appears better some maceration at the site  SKIN: No rashes or lesions  BACK: no pressor sore   NERVOUS SYSTEM:  Alert & Oriented X3,  LABS:                         10.8   5.56  )-----------( 343      ( 22 Apr 2019 06:26 )             34.8     04-22    142  |  110<H>  |  30<H>  ----------------------------<  70  3.8   |  27  |  0.75    Ca    9.5      22 Apr 2019 06:26  Phos  3.4     04-21  Mg     2.2     04-21                      Sedimentation Rate, Erythrocyte: 92 mm/hr (04-19 @ 18:38)      Vancomycin Level, Trough: 14.5 ug/mL (04-22 @ 06:26)        Culture Results:   Growth in fluid media only Staphylococcus species Identification and  susceptibility to follow. (04-17 @ 12:13)                Radiology:      < from: Xray Foot AP + Lateral + Oblique, Left (04.17.19 @ 10:45) >  EXAM:  FOOT COMPLETE  3 VWS   LT                            PROCEDURE DATE:  04/17/2019          INTERPRETATION:  DATE OF STUDY: 4/17/19.    COMPARISON: 4/16/19.    CLINICAL HISTORY:  Status post partial 5th ray resection.    Technique: 3 left footfilms taken.    FINDINGS:  There has been partial resection of the 5th toe beyond the neck of the   fifth metatarsal. There is a sharp resected bony margin. The amputation   bed is well-maintained.  No new osteomyelitic erosions.  Overlying bandagingin situ.  No tracking of soft tissue air.    IMPRESSION:  S/P partial resection of the left th toe, as above.                BERTA PURCELL M.D., ATTENDING RADIOLOGIST  This document has been electronically signed. Apr 17 2019  1:23PM                < end of copied text >  MRI not done   had a mri 2016
Patient is a 64y old  Male who presents with a chief complaint of diabetic foot infection (18 Apr 2019 12:45)      INTERVAL HPI/OVERNIGHT EVENTS: no events     MEDICATIONS  (STANDING):  dextrose 5% + sodium chloride 0.9%. 1000 milliLiter(s) (50 mL/Hr) IV Continuous <Continuous>  dextrose 5%. 1000 milliLiter(s) (50 mL/Hr) IV Continuous <Continuous>  dextrose 50% Injectable 12.5 Gram(s) IV Push once  dextrose 50% Injectable 25 Gram(s) IV Push once  dextrose 50% Injectable 25 Gram(s) IV Push once  insulin glargine Injectable (LANTUS) 30 Unit(s) SubCutaneous two times a day  insulin lispro (HumaLOG) corrective regimen sliding scale   SubCutaneous three times a day before meals  insulin lispro (HumaLOG) corrective regimen sliding scale   SubCutaneous at bedtime  insulin lispro Injectable (HumaLOG) 5 Unit(s) SubCutaneous three times a day before meals  lactobacillus acidophilus 1 Tablet(s) Oral three times a day  piperacillin/tazobactam IVPB. 3.375 Gram(s) IV Intermittent every 8 hours  sodium chloride 0.9%. 1000 milliLiter(s) (40 mL/Hr) IV Continuous <Continuous>  vancomycin  IVPB 1000 milliGRAM(s) IV Intermittent every 8 hours    MEDICATIONS  (PRN):  acetaminophen   Tablet .. 650 milliGRAM(s) Oral every 6 hours PRN Mild Pain (1 - 3)  dextrose 40% Gel 15 Gram(s) Oral once PRN Blood Glucose LESS THAN 70 milliGRAM(s)/deciliter  glucagon  Injectable 1 milliGRAM(s) IntraMuscular once PRN Glucose LESS THAN 70 milligrams/deciliter  oxyCODONE    5 mG/acetaminophen 325 mG 1 Tablet(s) Oral every 4 hours PRN Moderate Pain (4 - 6)      Allergies    bananas (Rash)  Cauliflower (Other)  No Known Drug Allergies    Intolerances         Vital Signs Last 24 Hrs  T(C): 36.5 (19 Apr 2019 11:00), Max: 36.5 (19 Apr 2019 11:00)  T(F): 97.7 (19 Apr 2019 11:00), Max: 97.7 (19 Apr 2019 11:00)  HR: 70 (19 Apr 2019 11:00) (64 - 91)  BP: 99/55 (19 Apr 2019 11:00) (99/55 - 113/63)  BP(mean): --  RR: 16 (19 Apr 2019 11:00) (16 - 18)  SpO2: 99% (19 Apr 2019 11:00) (96% - 100%)    PHYSICAL EXAM:  GENERAL: NAD, well-groomed, well-developed  HEAD:  Atraumatic, Normocephalic  EYES: EOMI, PERRLA, conjunctiva and sclera clear  ENMT: No tonsillar erythema, exudates, or enlargement; Moist mucous membranes, Good dentition, No lesions  NECK: Supple, No JVD, Normal thyroid  NERVOUS SYSTEM:  Alert & Oriented X3, Good concentration; Motor Strength 5/5 B/L upper and lower extremities; DTRs 2+ intact and symmetric  CHEST/LUNG: Clear to percussion bilaterally; No rales, rhonchi, wheezing, or rubs  HEART: Regular rate and rhythm; No murmurs, rubs, or gallops  ABDOMEN: Soft, Nontender, Nondistended; Bowel sounds present  EXTREMITIES:  2+ Peripheral Pulses, No clubbing, cyanosis, or edema. foot bandaged- no surrounding cellulitis   LYMPH: No lymphadenopathy noted  SKIN: No rashes or lesions    LABS:                        10.6   4.31  )-----------( 278      ( 19 Apr 2019 07:17 )             33.4     04-19    144  |  110<H>  |  27<H>  ----------------------------<  155<H>  3.9   |  28  |  0.82    Ca    8.8      19 Apr 2019 07:17          CAPILLARY BLOOD GLUCOSE      POCT Blood Glucose.: 122 mg/dL (19 Apr 2019 11:00)  POCT Blood Glucose.: 169 mg/dL (19 Apr 2019 07:47)  POCT Blood Glucose.: 241 mg/dL (18 Apr 2019 23:32)  POCT Blood Glucose.: 142 mg/dL (18 Apr 2019 16:53)      RADIOLOGY & ADDITIONAL TESTS:    Imaging Personally Reviewed:  [ X] YES  [ ] NO    Consultant(s) Notes Reviewed:  [ X] YES  [ ] NO    Care Discussed with Consultants/Other Providers [X ] YES  [ ] NO
Patient is a 64y old  Male who presents with a chief complaint of foot infection (12 Apr 2019 15:51)       INTERVAL HPI/OVERNIGHT EVENTS:  Patient seen and evaluated at bedside.  Pt is resting comfortable in NAD. Denies N/V/F/C. Mild pain with manipulation of wound.    Allergies    bananas (Rash)  Cauliflower (Other)  No Known Drug Allergies    Intolerances        Vital Signs Last 24 Hrs  T(C): 36.7 (12 Apr 2019 12:09), Max: 36.7 (12 Apr 2019 00:06)  T(F): 98 (12 Apr 2019 12:09), Max: 98.1 (12 Apr 2019 00:06)  HR: 65 (12 Apr 2019 12:09) (65 - 94)  BP: 101/53 (12 Apr 2019 12:09) (97/52 - 108/64)  BP(mean): --  RR: 16 (12 Apr 2019 12:09) (16 - 17)  SpO2: 100% (12 Apr 2019 12:09) (98% - 100%)    LABS:                        10.4   6.68  )-----------( 205      ( 12 Apr 2019 07:58 )             32.5     04-12    137  |  103  |  17  ----------------------------<  308<H>  3.8   |  26  |  0.86    Ca    8.9      12 Apr 2019 07:58    TPro  8.3  /  Alb  2.8<L>  /  TBili  0.3  /  DBili  x   /  AST  9<L>  /  ALT  12  /  AlkPhos  99  04-11    PT/INR - ( 11 Apr 2019 15:40 )   PT: 11.0 sec;   INR: 0.98 ratio         PTT - ( 11 Apr 2019 15:40 )  PTT:28.9 sec    CAPILLARY BLOOD GLUCOSE      POCT Blood Glucose.: 288 mg/dL (12 Apr 2019 15:44)  POCT Blood Glucose.: 250 mg/dL (12 Apr 2019 12:28)  POCT Blood Glucose.: 303 mg/dL (12 Apr 2019 07:33)  POCT Blood Glucose.: 311 mg/dL (12 Apr 2019 02:14)  POCT Blood Glucose.: 401 mg/dL (11 Apr 2019 22:58)  POCT Blood Glucose.: 401 mg/dL (11 Apr 2019 21:35)      Lower Extremity Physical Exam:  Vascular: DP/PT 2/4, B/L, CFT <3 seconds B/L, Temperature gradient warm,, B/L. (L dorsolateral foot warmer)  Neuro: Epicritic sensation diminished to b/l feet  Musculoskeletal/Ortho: no gross deformtiies  Skin:  Wound #1: plantar 5th met head  Size: 2.3 x 3.2 cm   Depth: 1.8 cm (to the dorsal foot) also probes to bone  Wound bed: fibrotic and liquefactive  Drainage: purulent  Odor: no  Periwound: hyperkeratotic  Etiology: pressure    RADIOLOGY & ADDITIONAL TESTS:
Patient is a 64y old  Male who presents with a chief complaint of foot infevtion (19 Apr 2019 15:57)       INTERVAL HPI/OVERNIGHT EVENTS:  Patient seen and evaluated at bedside.  Pt is resting comfortable in NAD. Denies N/V/F/C.    Allergies    bananas (Rash)  Cauliflower (Other)  No Known Drug Allergies    Intolerances        Vital Signs Last 24 Hrs  T(C): 36.5 (22 Apr 2019 11:31), Max: 36.5 (22 Apr 2019 11:31)  T(F): 97.7 (22 Apr 2019 11:31), Max: 97.7 (22 Apr 2019 11:31)  HR: 94 (22 Apr 2019 11:31) (52 - 94)  BP: 99/66 (22 Apr 2019 11:31) (99/66 - 118/64)  BP(mean): --  RR: 16 (22 Apr 2019 11:31) (16 - 18)  SpO2: 100% (22 Apr 2019 11:31) (97% - 100%)    LABS:                        10.8   5.56  )-----------( 343      ( 22 Apr 2019 06:26 )             34.8     04-22    142  |  110<H>  |  30<H>  ----------------------------<  70  3.8   |  27  |  0.75    Ca    9.5      22 Apr 2019 06:26  Phos  3.4     04-21  Mg     2.2     04-21          CAPILLARY BLOOD GLUCOSE      POCT Blood Glucose.: 131 mg/dL (22 Apr 2019 11:40)  POCT Blood Glucose.: 116 mg/dL (22 Apr 2019 08:31)  POCT Blood Glucose.: 135 mg/dL (21 Apr 2019 21:05)  POCT Blood Glucose.: 145 mg/dL (21 Apr 2019 16:37)      Lower Extremity Physical Exam:  incisions site closed with sutures intact and skin well coapted, flap is viable however guarded prognosis, stravix grafts intact with sutures, no signs of dehiscence, no drainge
Patient is a 64y old  Male who presents with a chief complaint of foot infevtion (19 Apr 2019 15:57)      INTERVAL HPI/OVERNIGHT EVENTS: no events     MEDICATIONS  (STANDING):  AQUAPHOR (petrolatum Ointment) 1 Application(s) Topical two times a day  dextrose 5% + sodium chloride 0.9%. 1000 milliLiter(s) (50 mL/Hr) IV Continuous <Continuous>  dextrose 5%. 1000 milliLiter(s) (50 mL/Hr) IV Continuous <Continuous>  dextrose 50% Injectable 12.5 Gram(s) IV Push once  dextrose 50% Injectable 25 Gram(s) IV Push once  dextrose 50% Injectable 25 Gram(s) IV Push once  insulin glargine Injectable (LANTUS) 30 Unit(s) SubCutaneous two times a day  insulin lispro (HumaLOG) corrective regimen sliding scale   SubCutaneous three times a day before meals  insulin lispro (HumaLOG) corrective regimen sliding scale   SubCutaneous at bedtime  insulin lispro Injectable (HumaLOG) 5 Unit(s) SubCutaneous three times a day before meals  lactobacillus acidophilus 1 Tablet(s) Oral three times a day  piperacillin/tazobactam IVPB. 3.375 Gram(s) IV Intermittent every 8 hours  sodium chloride 0.9%. 1000 milliLiter(s) (40 mL/Hr) IV Continuous <Continuous>  vancomycin  IVPB 1000 milliGRAM(s) IV Intermittent every 8 hours    MEDICATIONS  (PRN):  acetaminophen   Tablet .. 650 milliGRAM(s) Oral every 6 hours PRN Mild Pain (1 - 3)  dextrose 40% Gel 15 Gram(s) Oral once PRN Blood Glucose LESS THAN 70 milliGRAM(s)/deciliter  glucagon  Injectable 1 milliGRAM(s) IntraMuscular once PRN Glucose LESS THAN 70 milligrams/deciliter  oxyCODONE    5 mG/acetaminophen 325 mG 1 Tablet(s) Oral every 4 hours PRN Moderate Pain (4 - 6)      Allergies    bananas (Rash)  Cauliflower (Other)  No Known Drug Allergies    Intolerances         Vital Signs Last 24 Hrs  T(C): 36.4 (20 Apr 2019 11:25), Max: 36.4 (20 Apr 2019 11:25)  T(F): 97.6 (20 Apr 2019 11:25), Max: 97.6 (20 Apr 2019 11:25)  HR: 65 (20 Apr 2019 11:25) (65 - 90)  BP: 92/56 (20 Apr 2019 11:25) (92/56 - 127/68)  BP(mean): --  RR: 16 (20 Apr 2019 11:25) (16 - 19)  SpO2: 98% (20 Apr 2019 11:25) (97% - 98%)    PHYSICAL EXAM:  GENERAL: NAD, well-groomed, well-developed  HEAD:  Atraumatic, Normocephalic  EYES: EOMI, PERRLA, conjunctiva and sclera clear  ENMT: No tonsillar erythema, exudates, or enlargement; Moist mucous membranes, Good dentition, No lesions  NECK: Supple, No JVD, Normal thyroid  NERVOUS SYSTEM:  Alert & Oriented X3, Good concentration; Motor Strength 5/5 B/L upper and lower extremities; DTRs 2+ intact and symmetric  CHEST/LUNG: Clear to percussion bilaterally; No rales, rhonchi, wheezing, or rubs  HEART: Regular rate and rhythm; No murmurs, rubs, or gallops  ABDOMEN: Soft, Nontender, Nondistended; Bowel sounds present  EXTREMITIES:  2+ Peripheral Pulses, No clubbing, cyanosis, or edema. foot bandaged- no surrounding cellulitis   LYMPH: No lymphadenopathy noted  SKIN: No rashes or lesions  LABS:                        10.6   4.31  )-----------( 278      ( 19 Apr 2019 07:17 )             33.4     04-19    144  |  110<H>  |  27<H>  ----------------------------<  155<H>  3.9   |  28  |  0.82    Ca    8.8      19 Apr 2019 07:17          CAPILLARY BLOOD GLUCOSE      POCT Blood Glucose.: 115 mg/dL (20 Apr 2019 10:46)  POCT Blood Glucose.: 194 mg/dL (20 Apr 2019 07:13)  POCT Blood Glucose.: 234 mg/dL (19 Apr 2019 22:19)  POCT Blood Glucose.: 182 mg/dL (19 Apr 2019 16:28)      RADIOLOGY & ADDITIONAL TESTS:    Imaging Personally Reviewed:  [ X] YES  [ ] NO    Consultant(s) Notes Reviewed:  [ X] YES  [ ] NO    Care Discussed with Consultants/Other Providers [X ] YES  [ ] NO
Patient is a 64y old  Male who presents with a chief complaint of foot wound (16 Apr 2019 11:15)       INTERVAL HPI/OVERNIGHT EVENTS:  Patient seen and evaluated at bedside.  Pt is resting comfortable in NAD. Denies N/V/F/C.     Allergies    bananas (Rash)  Cauliflower (Other)  No Known Drug Allergies    Intolerances        Vital Signs Last 24 Hrs  T(C): 36.4 (17 Apr 2019 09:35), Max: 37 (16 Apr 2019 15:41)  T(F): 97.6 (17 Apr 2019 09:35), Max: 98.6 (16 Apr 2019 15:41)  HR: 63 (17 Apr 2019 09:35) (59 - 85)  BP: 108/55 (17 Apr 2019 09:35) (102/52 - 129/64)  BP(mean): --  RR: 16 (17 Apr 2019 09:35) (14 - 18)  SpO2: 99% (17 Apr 2019 09:35) (96% - 100%)    LABS:                        10.8   5.82  )-----------( 283      ( 17 Apr 2019 07:57 )             34.9     04-17    144  |  111<H>  |  22  ----------------------------<  131<H>  4.0   |  27  |  0.85    Ca    8.9      17 Apr 2019 07:57    TPro  7.5  /  Alb  2.6<L>  /  TBili  0.1<L>  /  DBili  x   /  AST  12<L>  /  ALT  14  /  AlkPhos  74  04-16    PT/INR - ( 16 Apr 2019 08:37 )   PT: 11.0 sec;   INR: 0.98 ratio         PTT - ( 16 Apr 2019 08:37 )  PTT:30.0 sec    CAPILLARY BLOOD GLUCOSE      POCT Blood Glucose.: 85 mg/dL (17 Apr 2019 11:11)  POCT Blood Glucose.: 124 mg/dL (17 Apr 2019 07:33)  POCT Blood Glucose.: 219 mg/dL (16 Apr 2019 21:04)  POCT Blood Glucose.: 108 mg/dL (16 Apr 2019 19:12)  POCT Blood Glucose.: 84 mg/dL (16 Apr 2019 15:53)  POCT Blood Glucose.: 57 mg/dL (16 Apr 2019 15:34)      Lower Extremity Physical Exam:  clean, dry, and intact
Patient is a 64y old  Male who presents with a chief complaint of left foot infection (18 Apr 2019 11:05)      INTERVAL HPI / OVERNIGHT EVENTS: doing ok     MEDICATIONS  (STANDING):  dextrose 5% + sodium chloride 0.9%. 1000 milliLiter(s) (50 mL/Hr) IV Continuous <Continuous>  dextrose 5%. 1000 milliLiter(s) (50 mL/Hr) IV Continuous <Continuous>  dextrose 50% Injectable 12.5 Gram(s) IV Push once  dextrose 50% Injectable 25 Gram(s) IV Push once  dextrose 50% Injectable 25 Gram(s) IV Push once  insulin glargine Injectable (LANTUS) 30 Unit(s) SubCutaneous two times a day  insulin lispro (HumaLOG) corrective regimen sliding scale   SubCutaneous three times a day before meals  insulin lispro (HumaLOG) corrective regimen sliding scale   SubCutaneous at bedtime  insulin lispro Injectable (HumaLOG) 5 Unit(s) SubCutaneous three times a day before meals  lactobacillus acidophilus 1 Tablet(s) Oral three times a day  piperacillin/tazobactam IVPB. 3.375 Gram(s) IV Intermittent every 8 hours  sodium chloride 0.9%. 1000 milliLiter(s) (40 mL/Hr) IV Continuous <Continuous>  vancomycin  IVPB 1000 milliGRAM(s) IV Intermittent every 8 hours    MEDICATIONS  (PRN):  acetaminophen   Tablet .. 650 milliGRAM(s) Oral every 6 hours PRN Mild Pain (1 - 3)  dextrose 40% Gel 15 Gram(s) Oral once PRN Blood Glucose LESS THAN 70 milliGRAM(s)/deciliter  glucagon  Injectable 1 milliGRAM(s) IntraMuscular once PRN Glucose LESS THAN 70 milligrams/deciliter  oxyCODONE    5 mG/acetaminophen 325 mG 1 Tablet(s) Oral every 4 hours PRN Moderate Pain (4 - 6)      Vital Signs Last 24 Hrs  T(C): 36.2 (18 Apr 2019 06:13), Max: 36.6 (17 Apr 2019 23:59)  T(F): 97.2 (18 Apr 2019 06:13), Max: 97.8 (17 Apr 2019 23:59)  HR: 77 (18 Apr 2019 09:35) (66 - 89)  BP: 110/57 (18 Apr 2019 09:35) (102/62 - 158/71)  BP(mean): --  RR: 18 (18 Apr 2019 09:35) (16 - 18)  SpO2: 100% (18 Apr 2019 09:35) (96% - 100%)    Review of systems:  General : no fever /chills,fatigue  CVS : no chest pain, palpitations  Lungs : no shortness of breath, cough  GI : no abdominal pain, vomiting, diarrhea   : no dysuria, hematuria        PHYSICAL EXAM:  General :NAD  Constitutional:  well-groomed, well-developed  Respiratory: CTAB/L  Cardiovascular: S1 and S2, RRR, no M/G/R  Gastrointestinal: BS+, soft, NT/ND  Extremities: No peripheral edema  Vascular: 2+ peripheral pulses  Skin: right arm swelling resolving,left foot wound s/p fifth toe resection    LABS:                        10.8   5.82  )-----------( 283      ( 17 Apr 2019 07:57 )             34.9     04-17    144  |  111<H>  |  22  ----------------------------<  131<H>  4.0   |  27  |  0.85    Ca    8.9      17 Apr 2019 07:57            MICROBIOLOGY:  RECENT CULTURES:  04-17 .Tissue left fifth metatarsal c and s XXXX   No polymorphonuclear leukocytes seen per low power field  No organisms seen per oil power field XXXX    04-12 .Abscess Providencia rettgeri XXXX   Few Providencia rettgeri  Few Coag Negative Staphylococcus "Susceptibilities not performed"  Numerous Bacteroides fragilis "Susceptibilities not performed"  Numerous Streptococcus agalactiae (Group B) isolated  Group B streptococci are susceptible to ampicillin,  penicillin and cefazolin, but may be resistant to  erythromycin and clindamycin.  Recommendations for intrapartum prophylaxis for Group B  streptococci are penicillin or ampicillin.    04-11 .Blood XXXX XXXX   No growth at 5 days.          RADIOLOGY & ADDITIONAL STUDIES:
Patient is a 64y old  Male who presents with a chief complaint of left foot infection (18 Apr 2019 11:05)      INTERVAL HPI/OVERNIGHT EVENTS:    MEDICATIONS  (STANDING):  dextrose 5% + sodium chloride 0.9%. 1000 milliLiter(s) (50 mL/Hr) IV Continuous <Continuous>  dextrose 5%. 1000 milliLiter(s) (50 mL/Hr) IV Continuous <Continuous>  dextrose 50% Injectable 12.5 Gram(s) IV Push once  dextrose 50% Injectable 25 Gram(s) IV Push once  dextrose 50% Injectable 25 Gram(s) IV Push once  insulin glargine Injectable (LANTUS) 30 Unit(s) SubCutaneous two times a day  insulin lispro (HumaLOG) corrective regimen sliding scale   SubCutaneous three times a day before meals  insulin lispro (HumaLOG) corrective regimen sliding scale   SubCutaneous at bedtime  insulin lispro Injectable (HumaLOG) 5 Unit(s) SubCutaneous three times a day before meals  lactobacillus acidophilus 1 Tablet(s) Oral three times a day  piperacillin/tazobactam IVPB. 3.375 Gram(s) IV Intermittent every 8 hours  sodium chloride 0.9%. 1000 milliLiter(s) (40 mL/Hr) IV Continuous <Continuous>  vancomycin  IVPB 1000 milliGRAM(s) IV Intermittent every 8 hours    MEDICATIONS  (PRN):  acetaminophen   Tablet .. 650 milliGRAM(s) Oral every 6 hours PRN Mild Pain (1 - 3)  dextrose 40% Gel 15 Gram(s) Oral once PRN Blood Glucose LESS THAN 70 milliGRAM(s)/deciliter  glucagon  Injectable 1 milliGRAM(s) IntraMuscular once PRN Glucose LESS THAN 70 milligrams/deciliter  oxyCODONE    5 mG/acetaminophen 325 mG 1 Tablet(s) Oral every 4 hours PRN Moderate Pain (4 - 6)      Allergies    bananas (Rash)  Cauliflower (Other)  No Known Drug Allergies    Intolerances           Vital Signs Last 24 Hrs  T(C): 36.2 (18 Apr 2019 06:13), Max: 36.6 (17 Apr 2019 23:59)  T(F): 97.2 (18 Apr 2019 06:13), Max: 97.8 (17 Apr 2019 23:59)  HR: 77 (18 Apr 2019 09:35) (66 - 89)  BP: 110/57 (18 Apr 2019 09:35) (102/62 - 158/71)  BP(mean): --  RR: 18 (18 Apr 2019 09:35) (16 - 18)  SpO2: 100% (18 Apr 2019 09:35) (96% - 100%)    PHYSICAL EXAM:  GENERAL: NAD, well-groomed, well-developed  HEAD:  Atraumatic, Normocephalic  EYES: EOMI, PERRLA, conjunctiva and sclera clear  ENMT: No tonsillar erythema, exudates, or enlargement; Moist mucous membranes, Good dentition, No lesions  NECK: Supple, No JVD, Normal thyroid  NERVOUS SYSTEM:  Alert & Oriented X3, Good concentration; Motor Strength 5/5 B/L upper and lower extremities; DTRs 2+ intact and symmetric  CHEST/LUNG: Clear to percussion bilaterally; No rales, rhonchi, wheezing, or rubs  HEART: Regular rate and rhythm; No murmurs, rubs, or gallops  ABDOMEN: Soft, Nontender, Nondistended; Bowel sounds present  EXTREMITIES:  2+ Peripheral Pulses, No clubbing, cyanosis, or edema. foot bandaged- no surrounding cellulitis   LYMPH: No lymphadenopathy noted  SKIN: No rashes or lesions    LABS:                        10.8   5.82  )-----------( 283      ( 17 Apr 2019 07:57 )             34.9     04-17    144  |  111<H>  |  22  ----------------------------<  131<H>  4.0   |  27  |  0.85    Ca    8.9      17 Apr 2019 07:57          CAPILLARY BLOOD GLUCOSE      POCT Blood Glucose.: 170 mg/dL (18 Apr 2019 07:37)  POCT Blood Glucose.: 189 mg/dL (17 Apr 2019 22:04)  POCT Blood Glucose.: 266 mg/dL (17 Apr 2019 16:35)      RADIOLOGY & ADDITIONAL TESTS:    Imaging Personally Reviewed:  [ X] YES  [ ] NO    Consultant(s) Notes Reviewed:  [ X] YES  [ ] NO    Care Discussed with Consultants/Other Providers [X ] YES  [ ] NO
Patient is a 64y old  Male who presents with a chief complaint of left foot pain (15 Apr 2019 12:39)      INTERVAL HPI / OVERNIGHT EVENTS: doing ok ,no new c/o     MEDICATIONS  (STANDING):  dextrose 5%. 1000 milliLiter(s) (50 mL/Hr) IV Continuous <Continuous>  dextrose 50% Injectable 12.5 Gram(s) IV Push once  dextrose 50% Injectable 25 Gram(s) IV Push once  dextrose 50% Injectable 25 Gram(s) IV Push once  insulin glargine Injectable (LANTUS) 30 Unit(s) SubCutaneous two times a day  insulin lispro (HumaLOG) corrective regimen sliding scale   SubCutaneous three times a day before meals  insulin lispro (HumaLOG) corrective regimen sliding scale   SubCutaneous at bedtime  insulin lispro Injectable (HumaLOG) 5 Unit(s) SubCutaneous three times a day before meals  piperacillin/tazobactam IVPB. 3.375 Gram(s) IV Intermittent every 8 hours  vancomycin  IVPB 1000 milliGRAM(s) IV Intermittent every 8 hours    MEDICATIONS  (PRN):  acetaminophen   Tablet .. 650 milliGRAM(s) Oral every 6 hours PRN Mild Pain (1 - 3)  dextrose 40% Gel 15 Gram(s) Oral once PRN Blood Glucose LESS THAN 70 milliGRAM(s)/deciliter  glucagon  Injectable 1 milliGRAM(s) IntraMuscular once PRN Glucose LESS THAN 70 milligrams/deciliter      Vital Signs Last 24 Hrs  T(C): 36.6 (15 Apr 2019 11:58), Max: 36.7 (14 Apr 2019 17:15)  T(F): 97.9 (15 Apr 2019 11:58), Max: 98 (14 Apr 2019 17:15)  HR: 81 (15 Apr 2019 11:58) (67 - 81)  BP: 92/58 (15 Apr 2019 11:58) (92/58 - 129/75)  BP(mean): --  RR: 16 (15 Apr 2019 11:58) (16 - 16)  SpO2: 98% (15 Apr 2019 11:58) (98% - 99%)    Review of systems:  General : no fever /chills, fatigue  CVS : no chest pain, palpitations  Lungs : no shortness of breath, cough  GI : no abdominal pain, vomiting, diarrhea   : no dysuria, hematuria        PHYSICAL EXAM:  General :NAD  Constitutional:  well-groomed, well-developed  Respiratory: CTAB/L  Cardiovascular: S1 and S2, RRR, no M/G/R  Gastrointestinal: BS+, soft, NT/ND  Extremities: No peripheral edema  Vascular: 2+ peripheral pulses  Skin: left foot wound (under base of fifth toe)      LABS:                        10.9   5.97  )-----------( 251      ( 15 Apr 2019 04:11 )             33.5     04-15    141  |  108  |  32<H>  ----------------------------<  258<H>  3.9   |  24  |  1.01    Ca    9.0      15 Apr 2019 04:11            MICROBIOLOGY:  RECENT CULTURES:  04-12 .Abscess Providencia rettgeri XXXX   Few Providencia rettgeri  Few Coag Negative Staphylococcus "Susceptibilities not performed"  Numerous Streptococcus agalactiae (Group B) isolated  Group B streptococci are susceptible to ampicillin,  penicillin and cefazolin, but may be resistant to  erythromycin and clindamycin.  Recommendations for intrapartum prophylaxis for Group B  streptococci are penicillin or ampicillin.    04-11 .Blood XXXX XXXX   No growth to date.          RADIOLOGY & ADDITIONAL STUDIES:
Patient is a 64y old  Male who presents with a chief complaint of left foot pain (17 Apr 2019 16:17)       INTERVAL HPI/OVERNIGHT EVENTS:  Patient seen and evaluated at bedside.  Pt is resting comfortable in NAD. Denies N/V/F/C.     Allergies    bananas (Rash)  Cauliflower (Other)  No Known Drug Allergies    Intolerances        Vital Signs Last 24 Hrs  T(C): 36.2 (18 Apr 2019 06:13), Max: 36.6 (17 Apr 2019 23:59)  T(F): 97.2 (18 Apr 2019 06:13), Max: 97.8 (17 Apr 2019 23:59)  HR: 77 (18 Apr 2019 09:35) (66 - 89)  BP: 110/57 (18 Apr 2019 09:35) (102/62 - 158/71)  BP(mean): --  RR: 18 (18 Apr 2019 09:35) (16 - 18)  SpO2: 100% (18 Apr 2019 09:35) (96% - 100%)    LABS:                        10.8   5.82  )-----------( 283      ( 17 Apr 2019 07:57 )             34.9     04-17    144  |  111<H>  |  22  ----------------------------<  131<H>  4.0   |  27  |  0.85    Ca    8.9      17 Apr 2019 07:57          CAPILLARY BLOOD GLUCOSE      POCT Blood Glucose.: 170 mg/dL (18 Apr 2019 07:37)  POCT Blood Glucose.: 189 mg/dL (17 Apr 2019 22:04)  POCT Blood Glucose.: 266 mg/dL (17 Apr 2019 16:35)  POCT Blood Glucose.: 85 mg/dL (17 Apr 2019 11:11)      Lower Extremity Physical Exam:  incisions site closed with sutures intact and skin well coapted, dusky appearance to incision site, guarded prognosis, stravix grafts intact with sutures, no signs of dehiscence, no drainge    RADIOLOGY & ADDITIONAL TESTS:  Culture - Tissue with Gram Stain (04.17.19 @ 12:13)    Gram Stain:   No polymorphonuclear leukocytes seen per low power field  No organisms seen per oil power field    Specimen Source: .Tissue left fifth metatarsal c and s
Patient is a 64y old  Male who presents with a chief complaint of left foot pain (17 Apr 2019 16:17)      INTERVAL HPI / OVERNIGHT EVENTS: doing ok     MEDICATIONS  (STANDING):  dextrose 5% + sodium chloride 0.9%. 1000 milliLiter(s) (50 mL/Hr) IV Continuous <Continuous>  dextrose 5%. 1000 milliLiter(s) (50 mL/Hr) IV Continuous <Continuous>  dextrose 50% Injectable 12.5 Gram(s) IV Push once  dextrose 50% Injectable 25 Gram(s) IV Push once  dextrose 50% Injectable 25 Gram(s) IV Push once  insulin glargine Injectable (LANTUS) 30 Unit(s) SubCutaneous two times a day  insulin lispro (HumaLOG) corrective regimen sliding scale   SubCutaneous three times a day before meals  insulin lispro (HumaLOG) corrective regimen sliding scale   SubCutaneous at bedtime  insulin lispro Injectable (HumaLOG) 5 Unit(s) SubCutaneous three times a day before meals  lactobacillus acidophilus 1 Tablet(s) Oral three times a day  piperacillin/tazobactam IVPB. 3.375 Gram(s) IV Intermittent every 8 hours  sodium chloride 0.9%. 1000 milliLiter(s) (40 mL/Hr) IV Continuous <Continuous>  vancomycin  IVPB 1000 milliGRAM(s) IV Intermittent every 8 hours    MEDICATIONS  (PRN):  acetaminophen   Tablet .. 650 milliGRAM(s) Oral every 6 hours PRN Mild Pain (1 - 3)  dextrose 40% Gel 15 Gram(s) Oral once PRN Blood Glucose LESS THAN 70 milliGRAM(s)/deciliter  glucagon  Injectable 1 milliGRAM(s) IntraMuscular once PRN Glucose LESS THAN 70 milligrams/deciliter  oxyCODONE    5 mG/acetaminophen 325 mG 1 Tablet(s) Oral every 4 hours PRN Moderate Pain (4 - 6)      Vital Signs Last 24 Hrs  Tmax: afebrile    Review of systems:  General : no fever /chills,fatigue  CVS : no chest pain, palpitations  Lungs : no shortness of breath, cough  GI : no abdominal pain,vomiting, diarrhea   : no dysuria,hematuria        PHYSICAL EXAM:  General :NAD  Constitutional:  well-groomed, well-developed  Respiratory: CTAB/L  Cardiovascular: S1 and S2, RRR, no M/G/R  Gastrointestinal: BS+, soft, NT/ND  Extremities/skin : 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  right arm swelling ,no redness or pain    LABS:                        10.8   5.82  )-----------( 283      ( 17 Apr 2019 07:57 )             34.9     04-17    144  |  111<H>  |  22  ----------------------------<  131<H>  4.0   |  27  |  0.85    Ca    8.9      17 Apr 2019 07:57            MICROBIOLOGY:  RECENT CULTURES:  04-17 .Tissue left fifth metatarsal c and s XXXX   No polymorphonuclear leukocytes seen per low power field  No organisms seen per oil power field XXXX    04-12 .Abscess Providencia rettgeri XXXX   Few Providencia rettgeri  Few Coag Negative Staphylococcus "Susceptibilities not performed"  Numerous Bacteroides fragilis "Susceptibilities not performed"  Numerous Streptococcus agalactiae (Group B) isolated  Group B streptococci are susceptible to ampicillin,  penicillin and cefazolin, but may be resistant to  erythromycin and clindamycin.  Recommendations for intrapartum prophylaxis for Group B  streptococci are penicillin or ampicillin.    04-11 .Blood XXXX XXXX   No growth at 5 days.          RADIOLOGY & ADDITIONAL STUDIES:
Patient is a 64y old  Male who presents with a chief complaint of left foot wound    INTERVAL HPI/OVERNIGHT EVENTS: no complaints - pain controlled     MEDICATIONS  (STANDING):  dextrose 5% + sodium chloride 0.9%. 1000 milliLiter(s) (50 mL/Hr) IV Continuous <Continuous>  dextrose 5%. 1000 milliLiter(s) (50 mL/Hr) IV Continuous <Continuous>  dextrose 50% Injectable 12.5 Gram(s) IV Push once  dextrose 50% Injectable 25 Gram(s) IV Push once  dextrose 50% Injectable 25 Gram(s) IV Push once  insulin glargine Injectable (LANTUS) 30 Unit(s) SubCutaneous two times a day  insulin lispro (HumaLOG) corrective regimen sliding scale   SubCutaneous three times a day before meals  insulin lispro (HumaLOG) corrective regimen sliding scale   SubCutaneous at bedtime  insulin lispro Injectable (HumaLOG) 5 Unit(s) SubCutaneous three times a day before meals  lactobacillus acidophilus 1 Tablet(s) Oral three times a day  piperacillin/tazobactam IVPB. 3.375 Gram(s) IV Intermittent every 8 hours  sodium chloride 0.9%. 1000 milliLiter(s) (40 mL/Hr) IV Continuous <Continuous>  vancomycin  IVPB 1000 milliGRAM(s) IV Intermittent every 8 hours    MEDICATIONS  (PRN):  acetaminophen   Tablet .. 650 milliGRAM(s) Oral every 6 hours PRN Mild Pain (1 - 3)  dextrose 40% Gel 15 Gram(s) Oral once PRN Blood Glucose LESS THAN 70 milliGRAM(s)/deciliter  glucagon  Injectable 1 milliGRAM(s) IntraMuscular once PRN Glucose LESS THAN 70 milligrams/deciliter  oxyCODONE    5 mG/acetaminophen 325 mG 1 Tablet(s) Oral every 4 hours PRN Moderate Pain (4 - 6)    Allergies:  bananas (Rash)  Cauliflower (Other)  No Known Drug Allergies    Vital Signs Last 24 Hrs  T(C): 36.4 (17 Apr 2019 09:35), Max: 36.6 (17 Apr 2019 01:30)  T(F): 97.6 (17 Apr 2019 09:35), Max: 97.8 (17 Apr 2019 01:30)  HR: 79 (17 Apr 2019 14:07) (59 - 80)  BP: 102/62 (17 Apr 2019 14:07) (102/52 - 129/64)  BP(mean): --  RR: 18 (17 Apr 2019 14:07) (14 - 18)  SpO2: 100% (17 Apr 2019 14:07) (96% - 100%)    CAPILLARY BLOOD GLUCOSE    POCT Blood Glucose.: 85 mg/dL (17 Apr 2019 11:11)  POCT Blood Glucose.: 124 mg/dL (17 Apr 2019 07:33)  POCT Blood Glucose.: 219 mg/dL (16 Apr 2019 21:04)  POCT Blood Glucose.: 108 mg/dL (16 Apr 2019 19:12)    PHYSICAL EXAM:  GENERAL: NAD, well-groomed, well-developed  HEAD:  Atraumatic, Normocephalic  EYES: EOMI, PERRLA, conjunctiva and sclera clear  ENMT: No tonsillar erythema, exudates, or enlargement; Moist mucous membranes, Good dentition, No lesions  NECK: Supple, No JVD, Normal thyroid  NERVOUS SYSTEM:  Alert & Oriented X3, Good concentration; Motor Strength 5/5 B/L upper and lower extremities; DTRs 2+ intact and symmetric  CHEST/LUNG: Clear to percussion bilaterally; No rales, rhonchi, wheezing, or rubs  HEART: Regular rate and rhythm; No murmurs, rubs, or gallops  ABDOMEN: Soft, Nontender, Nondistended; Bowel sounds present  EXTREMITIES:  2+ Peripheral Pulses, No clubbing, cyanosis, or edema. foot bandaged- no surrounding cellulitis   LYMPH: No lymphadenopathy noted  SKIN: No rashes or lesions    Labs                      10.8   5.82  )-----------( 283      ( 17 Apr 2019 07:57 )             34.9     04-17    144  |  111<H>  |  22  ----------------------------<  131<H>  4.0   |  27  |  0.85    Ca    8.9      17 Apr 2019 07:57    TPro  7.5  /  Alb  2.6<L>  /  TBili  0.1<L>  /  DBili  x   /  AST  12<L>  /  ALT  14  /  AlkPhos  74  04-16    PT/INR - ( 16 Apr 2019 08:37 )   PT: 11.0 sec;   INR: 0.98 ratio    PTT - ( 16 Apr 2019 08:37 )  PTT:30.0 sec      Culture - Tissue with Gram Stain (collected 17 Apr 2019 12:13)  Source: .Tissue left fifth metatarsal c and s  Gram Stain (17 Apr 2019 14:13):    No polymorphonuclear leukocytes seen per low power field    No organisms seen per oil power field
Patient is a 64y old  Male who presents with a chief complaint of left foot wound    INTERVAL HPI/OVERNIGHT EVENTS: no complaints - pain controlled     MEDICATIONS  (STANDING):  dextrose 5%. 1000 milliLiter(s) (50 mL/Hr) IV Continuous <Continuous>  dextrose 50% Injectable 12.5 Gram(s) IV Push once  dextrose 50% Injectable 25 Gram(s) IV Push once  dextrose 50% Injectable 25 Gram(s) IV Push once  insulin glargine Injectable (LANTUS) 30 Unit(s) SubCutaneous two times a day  insulin lispro (HumaLOG) corrective regimen sliding scale   SubCutaneous three times a day before meals  insulin lispro (HumaLOG) corrective regimen sliding scale   SubCutaneous at bedtime  insulin lispro Injectable (HumaLOG) 5 Unit(s) SubCutaneous three times a day before meals  piperacillin/tazobactam IVPB. 3.375 Gram(s) IV Intermittent every 8 hours  vancomycin  IVPB 1000 milliGRAM(s) IV Intermittent every 8 hours    MEDICATIONS  (PRN):  acetaminophen   Tablet .. 650 milliGRAM(s) Oral every 6 hours PRN Mild Pain (1 - 3)  dextrose 40% Gel 15 Gram(s) Oral once PRN Blood Glucose LESS THAN 70 milliGRAM(s)/deciliter  glucagon  Injectable 1 milliGRAM(s) IntraMuscular once PRN Glucose LESS THAN 70 milligrams/deciliter    Allergies:  bananas (Rash)  Cauliflower (Other)  No Known Drug Allergies    Vital Signs Last 24 Hrs  T(C): 36.6 (15 Apr 2019 11:58), Max: 36.7 (14 Apr 2019 17:15)  T(F): 97.9 (15 Apr 2019 11:58), Max: 98 (14 Apr 2019 17:15)  HR: 81 (15 Apr 2019 11:58) (67 - 81)  BP: 92/58 (15 Apr 2019 11:58) (92/58 - 129/75)  BP(mean): --  RR: 16 (15 Apr 2019 11:58) (16 - 16)  SpO2: 98% (15 Apr 2019 11:58) (98% - 99%)    CAPILLARY BLOOD GLUCOSE    POCT Blood Glucose.: 104 mg/dL (15 Apr 2019 10:53)  POCT Blood Glucose.: 199 mg/dL (15 Apr 2019 07:32)  POCT Blood Glucose.: 212 mg/dL (14 Apr 2019 22:40)  POCT Blood Glucose.: 274 mg/dL (14 Apr 2019 16:11)    PHYSICAL EXAM:  GENERAL: NAD, well-groomed, well-developed  HEAD:  Atraumatic, Normocephalic  EYES: EOMI, PERRLA, conjunctiva and sclera clear  ENMT: No tonsillar erythema, exudates, or enlargement; Moist mucous membranes, Good dentition, No lesions  NECK: Supple, No JVD, Normal thyroid  NERVOUS SYSTEM:  Alert & Oriented X3, Good concentration; Motor Strength 5/5 B/L upper and lower extremities; DTRs 2+ intact and symmetric  CHEST/LUNG: Clear to percussion bilaterally; No rales, rhonchi, wheezing, or rubs  HEART: Regular rate and rhythm; No murmurs, rubs, or gallops  ABDOMEN: Soft, Nontender, Nondistended; Bowel sounds present  EXTREMITIES:  2+ Peripheral Pulses, No clubbing, cyanosis, or edema. foot bandaged- no surrounding cellulitis   LYMPH: No lymphadenopathy noted  SKIN: No rashes or lesions    Labs                      10.9   5.97  )-----------( 251      ( 15 Apr 2019 04:11 )             33.5     04-15    141  |  108  |  32<H>  ----------------------------<  258<H>  3.9   |  24  |  1.01    Ca    9.0      15 Apr 2019 04:11    PT/INR - ( 15 Apr 2019 04:11 )   PT: 10.5 sec;   INR: 0.94 ratio    PTT - ( 15 Apr 2019 04:11 )  PTT:30.4 sec
Patient is a 64y old  Male who presents with a chief complaint of left foot wound (12 Apr 2019 16:54)       INTERVAL HPI/OVERNIGHT EVENTS:  Patient seen and evaluated at bedside.  Pt is resting comfortable in NAD. Denies N/V/F/C.      Allergies    bananas (Rash)  Cauliflower (Other)  No Known Drug Allergies    Intolerances        Vital Signs Last 24 Hrs  T(C): 36.2 (14 Apr 2019 11:25), Max: 36.8 (13 Apr 2019 18:13)  T(F): 97.2 (14 Apr 2019 11:25), Max: 98.2 (13 Apr 2019 18:13)  HR: 82 (14 Apr 2019 11:25) (63 - 85)  BP: 105/64 (14 Apr 2019 11:25) (95/66 - 117/62)  BP(mean): --  RR: 17 (14 Apr 2019 11:25) (16 - 17)  SpO2: 97% (14 Apr 2019 11:25) (97% - 98%)    LABS:                        10.8   5.39  )-----------( 246      ( 14 Apr 2019 07:08 )             34.3     04-14    140  |  107  |  25<H>  ----------------------------<  215<H>  4.1   |  25  |  0.89    Ca    9.2      14 Apr 2019 07:08          CAPILLARY BLOOD GLUCOSE      POCT Blood Glucose.: 141 mg/dL (14 Apr 2019 10:37)  POCT Blood Glucose.: 263 mg/dL (14 Apr 2019 07:33)  POCT Blood Glucose.: 316 mg/dL (13 Apr 2019 21:35)  POCT Blood Glucose.: 245 mg/dL (13 Apr 2019 16:05)  POCT Blood Glucose.: 215 mg/dL (13 Apr 2019 12:23)      Lower Extremity Physical Exam:  Vascular: DP/PT 2/4, B/L, CFT <3 seconds B/L, Temperature gradient warm,, B/L. (L dorsolateral foot warmer)  Neuro: Epicritic sensation diminished to b/l feet  Musculoskeletal/Ortho: no gross deformtiies  Skin:  Wound #1: plantar 5th met head  Size: 2.3 x 3.2 cm   Depth: 1.8 cm (to the dorsal foot) also probes to bone  Wound bed: fibrotic and liquefactive  Drainage: purulent  Odor: no  Periwound: hyperkeratotic  Etiology: pressure
Patient is a 64y old  Male who presents with a chief complaint of left foot wound (12 Apr 2019 16:54)       INTERVAL HPI/OVERNIGHT EVENTS:  Patient seen and evaluated at bedside.  Pt is resting comfortable in NAD. Denies N/V/F/C.     Allergies    bananas (Rash)  Cauliflower (Other)  No Known Drug Allergies    Intolerances        Vital Signs Last 24 Hrs  T(C): 36.3 (13 Apr 2019 05:34), Max: 36.9 (12 Apr 2019 18:15)  T(F): 97.3 (13 Apr 2019 05:34), Max: 98.4 (12 Apr 2019 18:15)  HR: 70 (13 Apr 2019 05:34) (56 - 85)  BP: 103/49 (13 Apr 2019 05:34) (101/53 - 119/63)  BP(mean): --  RR: 16 (13 Apr 2019 05:34) (16 - 17)  SpO2: 97% (13 Apr 2019 05:34) (97% - 100%)    LABS:                        11.2   6.10  )-----------( 232      ( 13 Apr 2019 07:04 )             35.4     04-13    138  |  104  |  25<H>  ----------------------------<  328<H>  4.2   |  26  |  1.01    Ca    9.4      13 Apr 2019 07:04    TPro  8.3  /  Alb  2.8<L>  /  TBili  0.3  /  DBili  x   /  AST  9<L>  /  ALT  12  /  AlkPhos  99  04-11    PT/INR - ( 11 Apr 2019 15:40 )   PT: 11.0 sec;   INR: 0.98 ratio         PTT - ( 11 Apr 2019 15:40 )  PTT:28.9 sec    CAPILLARY BLOOD GLUCOSE      POCT Blood Glucose.: 363 mg/dL (13 Apr 2019 07:24)  POCT Blood Glucose.: 372 mg/dL (12 Apr 2019 22:07)  POCT Blood Glucose.: 288 mg/dL (12 Apr 2019 15:44)  POCT Blood Glucose.: 250 mg/dL (12 Apr 2019 12:28)      Lower Extremity Physical Exam:  Vascular: DP/PT 2/4, B/L, CFT <3 seconds B/L, Temperature gradient warm,, B/L. (L dorsolateral foot warmer)  Neuro: Epicritic sensation diminished to b/l feet  Musculoskeletal/Ortho: no gross deformtiies  Skin:  Wound #1: plantar 5th met head  Size: 2.3 x 3.2 cm   Depth: 1.8 cm (to the dorsal foot) also probes to bone  Wound bed: fibrotic and liquefactive  Drainage: purulent  Odor: no  Periwound: hyperkeratotic  Etiology: pressure
Patient is a 64y old  Male who presents with a chief complaint of left foot wound (12 Apr 2019 16:54)      INTERVAL HPI/OVERNIGHT EVENTS:  none. pain controlled     MEDICATIONS  (STANDING):  dextrose 5%. 1000 milliLiter(s) (50 mL/Hr) IV Continuous <Continuous>  dextrose 50% Injectable 12.5 Gram(s) IV Push once  dextrose 50% Injectable 25 Gram(s) IV Push once  dextrose 50% Injectable 25 Gram(s) IV Push once  insulin glargine Injectable (LANTUS) 30 Unit(s) SubCutaneous two times a day  insulin lispro (HumaLOG) corrective regimen sliding scale   SubCutaneous three times a day before meals  insulin lispro (HumaLOG) corrective regimen sliding scale   SubCutaneous at bedtime  insulin lispro Injectable (HumaLOG) 5 Unit(s) SubCutaneous three times a day before meals  piperacillin/tazobactam IVPB. 3.375 Gram(s) IV Intermittent every 8 hours  vancomycin  IVPB 1000 milliGRAM(s) IV Intermittent every 8 hours    MEDICATIONS  (PRN):  acetaminophen   Tablet .. 650 milliGRAM(s) Oral every 6 hours PRN Mild Pain (1 - 3)  dextrose 40% Gel 15 Gram(s) Oral once PRN Blood Glucose LESS THAN 70 milliGRAM(s)/deciliter  glucagon  Injectable 1 milliGRAM(s) IntraMuscular once PRN Glucose LESS THAN 70 milligrams/deciliter      Allergies    bananas (Rash)  Cauliflower (Other)  No Known Drug Allergies    Intolerances    Vital Signs Last 24 Hrs  T(C): 36.2 (14 Apr 2019 11:25), Max: 36.8 (13 Apr 2019 18:13)  T(F): 97.2 (14 Apr 2019 11:25), Max: 98.2 (13 Apr 2019 18:13)  HR: 82 (14 Apr 2019 11:25) (63 - 85)  BP: 105/64 (14 Apr 2019 11:25) (95/66 - 117/62)  BP(mean): --  RR: 17 (14 Apr 2019 11:25) (16 - 17)  SpO2: 97% (14 Apr 2019 11:25) (97% - 98%)    PHYSICAL EXAM:  GENERAL: NAD, well-groomed, well-developed  HEAD:  Atraumatic, Normocephalic  EYES: EOMI, PERRLA, conjunctiva and sclera clear  ENMT: No tonsillar erythema, exudates, or enlargement; Moist mucous membranes, Good dentition, No lesions  NECK: Supple, No JVD, Normal thyroid  NERVOUS SYSTEM:  Alert & Oriented X3, Good concentration; Motor Strength 5/5 B/L upper and lower extremities; DTRs 2+ intact and symmetric  CHEST/LUNG: Clear to percussion bilaterally; No rales, rhonchi, wheezing, or rubs  HEART: Regular rate and rhythm; No murmurs, rubs, or gallops  ABDOMEN: Soft, Nontender, Nondistended; Bowel sounds present  EXTREMITIES:  2+ Peripheral Pulses, No clubbing, cyanosis, or edema. foot bandaged- no surrounding cellulites   LYMPH: No lymphadenopathy noted  SKIN: No rashes or lesions    LABS:                                   10.8   5.39  )-----------( 246      ( 14 Apr 2019 07:08 )             34.3     04-14    140  |  107  |  25<H>  ----------------------------<  215<H>  4.1   |  25  |  0.89    Ca    9.2      14 Apr 2019 07:08                POCT Blood Glucose.: 215 mg/dL (13 Apr 2019 12:23)  POCT Blood Glucose.: 363 mg/dL (13 Apr 2019 07:24)  POCT Blood Glucose.: 372 mg/dL (12 Apr 2019 22:07)  POCT Blood Glucose.: 288 mg/dL (12 Apr 2019 15:44)      RADIOLOGY & ADDITIONAL TESTS:    Imaging Personally Reviewed:  [ ] YES  [ ] NO    Consultant(s) Notes Reviewed:  [ ] YES  [ ] NO    Care Discussed with Consultants/Other Providers [ ] YES  [ ] NO
Podiatry Pager #: [phone]    Patient is a 64y old  Male who presents with a chief complaint of left foot wound (12 Apr 2019 16:54)      INTERVAL HPI/OVERNIGHT EVENTS:   Pt is scheduled for Left foot partial 5th ray resection at 8:15 AM Patient is aware of procedure and is NPO since midnight.    MEDICATIONS  (STANDING):  dextrose 5%. 1000 milliLiter(s) (50 mL/Hr) IV Continuous <Continuous>  dextrose 50% Injectable 12.5 Gram(s) IV Push once  dextrose 50% Injectable 25 Gram(s) IV Push once  dextrose 50% Injectable 25 Gram(s) IV Push once  insulin glargine Injectable (LANTUS) 30 Unit(s) SubCutaneous two times a day  insulin lispro (HumaLOG) corrective regimen sliding scale   SubCutaneous three times a day before meals  insulin lispro (HumaLOG) corrective regimen sliding scale   SubCutaneous at bedtime  insulin lispro Injectable (HumaLOG) 5 Unit(s) SubCutaneous three times a day before meals  piperacillin/tazobactam IVPB. 3.375 Gram(s) IV Intermittent every 8 hours  vancomycin  IVPB 1000 milliGRAM(s) IV Intermittent every 8 hours    MEDICATIONS  (PRN):  acetaminophen   Tablet .. 650 milliGRAM(s) Oral every 6 hours PRN Mild Pain (1 - 3)  dextrose 40% Gel 15 Gram(s) Oral once PRN Blood Glucose LESS THAN 70 milliGRAM(s)/deciliter  glucagon  Injectable 1 milliGRAM(s) IntraMuscular once PRN Glucose LESS THAN 70 milligrams/deciliter      Allergies    bananas (Rash)  Cauliflower (Other)  No Known Drug Allergies    Intolerances        Vital Signs Last 24 Hrs  T(C): 36.2 (15 Apr 2019 06:17), Max: 36.7 (14 Apr 2019 17:15)  T(F): 97.2 (15 Apr 2019 06:17), Max: 98 (14 Apr 2019 17:15)  HR: 70 (15 Apr 2019 06:17) (67 - 82)  BP: 118/70 (15 Apr 2019 06:17) (105/64 - 129/75)  BP(mean): --  RR: 16 (15 Apr 2019 06:17) (16 - 17)  SpO2: 98% (15 Apr 2019 06:17) (97% - 99%)    LABS:                        10.9   5.97  )-----------( 251      ( 15 Apr 2019 04:11 )             33.5     04-15    141  |  108  |  32<H>  ----------------------------<  258<H>  3.9   |  24  |  1.01    Ca    9.0      15 Apr 2019 04:11      PT/INR - ( 15 Apr 2019 04:11 )   PT: 10.5 sec;   INR: 0.94 ratio         PTT - ( 15 Apr 2019 04:11 )  PTT:30.4 sec    CAPILLARY BLOOD GLUCOSE      POCT Blood Glucose.: 212 mg/dL (14 Apr 2019 22:40)  POCT Blood Glucose.: 274 mg/dL (14 Apr 2019 16:11)  POCT Blood Glucose.: 141 mg/dL (14 Apr 2019 10:37)  POCT Blood Glucose.: 263 mg/dL (14 Apr 2019 07:33)      RADIOLOGY & ADDITIONAL TESTS:
Podiatry Pager #: [phone]    Patient is a 64y old  Male who presents with a chief complaint of left foot pain (16 Apr 2019 08:59)      INTERVAL HPI/OVERNIGHT EVENTS:   Pt is scheduled for left foot partial 5th ray resection with Dr. Barrios at 3:30 pm. Patient is aware of procedure and is NPO since midnight.    MEDICATIONS  (STANDING):  dextrose 5% + sodium chloride 0.9%. 1000 milliLiter(s) (50 mL/Hr) IV Continuous <Continuous>  dextrose 5%. 1000 milliLiter(s) (50 mL/Hr) IV Continuous <Continuous>  dextrose 50% Injectable 12.5 Gram(s) IV Push once  dextrose 50% Injectable 25 Gram(s) IV Push once  dextrose 50% Injectable 25 Gram(s) IV Push once  insulin glargine Injectable (LANTUS) 30 Unit(s) SubCutaneous two times a day  insulin lispro (HumaLOG) corrective regimen sliding scale   SubCutaneous three times a day before meals  insulin lispro (HumaLOG) corrective regimen sliding scale   SubCutaneous at bedtime  insulin lispro Injectable (HumaLOG) 5 Unit(s) SubCutaneous three times a day before meals  lactobacillus acidophilus 1 Tablet(s) Oral three times a day  piperacillin/tazobactam IVPB. 3.375 Gram(s) IV Intermittent every 8 hours  sodium chloride 0.9%. 1000 milliLiter(s) (40 mL/Hr) IV Continuous <Continuous>  vancomycin  IVPB 1000 milliGRAM(s) IV Intermittent every 8 hours    MEDICATIONS  (PRN):  acetaminophen   Tablet .. 650 milliGRAM(s) Oral every 6 hours PRN Mild Pain (1 - 3)  dextrose 40% Gel 15 Gram(s) Oral once PRN Blood Glucose LESS THAN 70 milliGRAM(s)/deciliter  glucagon  Injectable 1 milliGRAM(s) IntraMuscular once PRN Glucose LESS THAN 70 milligrams/deciliter      Allergies    bananas (Rash)  Cauliflower (Other)  No Known Drug Allergies    Intolerances        Vital Signs Last 24 Hrs  T(C): 36.5 (16 Apr 2019 05:01), Max: 36.7 (15 Apr 2019 17:02)  T(F): 97.7 (16 Apr 2019 05:01), Max: 98 (15 Apr 2019 17:02)  HR: 60 (16 Apr 2019 05:01) (60 - 81)  BP: 118/69 (16 Apr 2019 05:01) (92/58 - 118/69)  BP(mean): --  RR: 16 (16 Apr 2019 05:01) (16 - 18)  SpO2: 100% (16 Apr 2019 05:01) (98% - 100%)    LABS:                        11.3   5.67  )-----------( 278      ( 16 Apr 2019 08:37 )             35.5     04-16    143  |  110<H>  |  25<H>  ----------------------------<  124<H>  3.9   |  27  |  0.80    Ca    9.2      16 Apr 2019 08:37    TPro  7.5  /  Alb  2.6<L>  /  TBili  0.1<L>  /  DBili  x   /  AST  12<L>  /  ALT  14  /  AlkPhos  74  04-16    PT/INR - ( 16 Apr 2019 08:37 )   PT: 11.0 sec;   INR: 0.98 ratio         PTT - ( 16 Apr 2019 08:37 )  PTT:30.0 sec    CAPILLARY BLOOD GLUCOSE      POCT Blood Glucose.: 96 mg/dL (16 Apr 2019 10:52)  POCT Blood Glucose.: 125 mg/dL (16 Apr 2019 08:48)  POCT Blood Glucose.: 136 mg/dL (16 Apr 2019 07:25)  POCT Blood Glucose.: 255 mg/dL (15 Apr 2019 21:48)  POCT Blood Glucose.: 157 mg/dL (15 Apr 2019 15:27)      RADIOLOGY & ADDITIONAL TESTS:
Podiatry pager #: [phone]    Patient is a 64y old  Male who presents with a chief complaint of diabetic foot infection (18 Apr 2019 12:45)       INTERVAL HPI/OVERNIGHT EVENTS:  Patient seen and evaluated at bedside.  Pt is resting comfortable in NAD. Denies N/V/F/C.      Allergies    bananas (Rash)  Cauliflower (Other)  No Known Drug Allergies    Intolerances        Vital Signs Last 24 Hrs  T(C): 36.5 (19 Apr 2019 11:00), Max: 36.5 (19 Apr 2019 11:00)  T(F): 97.7 (19 Apr 2019 11:00), Max: 97.7 (19 Apr 2019 11:00)  HR: 70 (19 Apr 2019 11:00) (64 - 91)  BP: 99/55 (19 Apr 2019 11:00) (99/55 - 113/63)  BP(mean): --  RR: 16 (19 Apr 2019 11:00) (16 - 18)  SpO2: 99% (19 Apr 2019 11:00) (96% - 100%)    LABS:                        10.6   4.31  )-----------( 278      ( 19 Apr 2019 07:17 )             33.4     04-19    144  |  110<H>  |  27<H>  ----------------------------<  155<H>  3.9   |  28  |  0.82    Ca    8.8      19 Apr 2019 07:17          CAPILLARY BLOOD GLUCOSE      POCT Blood Glucose.: 122 mg/dL (19 Apr 2019 11:00)  POCT Blood Glucose.: 169 mg/dL (19 Apr 2019 07:47)  POCT Blood Glucose.: 241 mg/dL (18 Apr 2019 23:32)  POCT Blood Glucose.: 142 mg/dL (18 Apr 2019 16:53)      Lower Extremity Physical Exam:  incisions site closed with sutures intact and skin well coapted, flap is viable however guarded prognosis, stravix grafts intact with sutures, no signs of dehiscence, no drainge    RADIOLOGY & ADDITIONAL TESTS:
Podiatry pager #: [phone]    Patient is a 64y old  Male who presents with a chief complaint of left foot wound (12 Apr 2019 16:54)       INTERVAL HPI/OVERNIGHT EVENTS:  Patient seen and evaluated at bedside.  Pt is resting comfortable in NAD. Denies N/V/F/C.  Pain rated at 0/10    Allergies    bananas (Rash)  Cauliflower (Other)  No Known Drug Allergies    Intolerances        Vital Signs Last 24 Hrs  T(C): 36.2 (15 Apr 2019 06:17), Max: 36.7 (14 Apr 2019 17:15)  T(F): 97.2 (15 Apr 2019 06:17), Max: 98 (14 Apr 2019 17:15)  HR: 70 (15 Apr 2019 06:17) (67 - 82)  BP: 118/70 (15 Apr 2019 06:17) (105/64 - 129/75)  BP(mean): --  RR: 16 (15 Apr 2019 06:17) (16 - 17)  SpO2: 98% (15 Apr 2019 06:17) (97% - 99%)    LABS:                        10.9   5.97  )-----------( 251      ( 15 Apr 2019 04:11 )             33.5     04-15    141  |  108  |  32<H>  ----------------------------<  258<H>  3.9   |  24  |  1.01    Ca    9.0      15 Apr 2019 04:11      PT/INR - ( 15 Apr 2019 04:11 )   PT: 10.5 sec;   INR: 0.94 ratio         PTT - ( 15 Apr 2019 04:11 )  PTT:30.4 sec    CAPILLARY BLOOD GLUCOSE      POCT Blood Glucose.: 199 mg/dL (15 Apr 2019 07:32)  POCT Blood Glucose.: 212 mg/dL (14 Apr 2019 22:40)  POCT Blood Glucose.: 274 mg/dL (14 Apr 2019 16:11)  POCT Blood Glucose.: 141 mg/dL (14 Apr 2019 10:37)      Lower Extremity Physical Exam:  Vascular: DP/PT 2/4, B/L, CFT <3 seconds B/L, Temperature gradient warm, B/L. (L dorsolateral foot warmer)  Neuro: Epicritic sensation diminished to b/l feet  Musculoskeletal/Ortho: no gross deformtiies  Skin:  Wound #1: plantar 5th met head  Size: 2.3 x 3.2 cm   Depth: 1.8 cm (to the dorsal foot) also probes to bone  Wound bed: fibrotic and liquefactive  Drainage: purulent  Odor: no  Periwound: hyperkeratotic  Etiology: pressure
Patient is a 64y old  Male who presents with a chief complaint of left foot wound    INTERVAL HPI/OVERNIGHT EVENTS: no complaints - pain controlled     MEDICATIONS  (STANDING):  dextrose 5% + sodium chloride 0.9%. 1000 milliLiter(s) (50 mL/Hr) IV Continuous <Continuous>  dextrose 5%. 1000 milliLiter(s) (50 mL/Hr) IV Continuous <Continuous>  dextrose 50% Injectable 12.5 Gram(s) IV Push once  dextrose 50% Injectable 25 Gram(s) IV Push once  dextrose 50% Injectable 25 Gram(s) IV Push once  insulin glargine Injectable (LANTUS) 30 Unit(s) SubCutaneous two times a day  insulin lispro (HumaLOG) corrective regimen sliding scale   SubCutaneous three times a day before meals  insulin lispro (HumaLOG) corrective regimen sliding scale   SubCutaneous at bedtime  insulin lispro Injectable (HumaLOG) 5 Unit(s) SubCutaneous three times a day before meals  lactobacillus acidophilus 1 Tablet(s) Oral three times a day  piperacillin/tazobactam IVPB. 3.375 Gram(s) IV Intermittent every 8 hours  sodium chloride 0.9%. 1000 milliLiter(s) (40 mL/Hr) IV Continuous <Continuous>  vancomycin  IVPB 1000 milliGRAM(s) IV Intermittent every 8 hours    MEDICATIONS  (PRN):  acetaminophen   Tablet .. 650 milliGRAM(s) Oral every 6 hours PRN Mild Pain (1 - 3)  dextrose 40% Gel 15 Gram(s) Oral once PRN Blood Glucose LESS THAN 70 milliGRAM(s)/deciliter  glucagon  Injectable 1 milliGRAM(s) IntraMuscular once PRN Glucose LESS THAN 70 milligrams/deciliter    Allergies:  bananas (Rash)  Cauliflower (Other)  No Known Drug Allergies    Vital Signs Last 24 Hrs  T(C): 36.5 (16 Apr 2019 05:01), Max: 36.7 (15 Apr 2019 17:02)  T(F): 97.7 (16 Apr 2019 05:01), Max: 98 (15 Apr 2019 17:02)  HR: 60 (16 Apr 2019 05:01) (60 - 81)  BP: 118/69 (16 Apr 2019 05:01) (92/58 - 118/69)  BP(mean): --  RR: 16 (16 Apr 2019 05:01) (16 - 18)  SpO2: 100% (16 Apr 2019 05:01) (98% - 100%)    CAPILLARY BLOOD GLUCOSE    POCT Blood Glucose.: 125 mg/dL (16 Apr 2019 08:48)  POCT Blood Glucose.: 136 mg/dL (16 Apr 2019 07:25)  POCT Blood Glucose.: 255 mg/dL (15 Apr 2019 21:48)  POCT Blood Glucose.: 157 mg/dL (15 Apr 2019 15:27)  POCT Blood Glucose.: 104 mg/dL (15 Apr 2019 10:53)    PHYSICAL EXAM:  GENERAL: NAD, well-groomed, well-developed  HEAD:  Atraumatic, Normocephalic  EYES: EOMI, PERRLA, conjunctiva and sclera clear  ENMT: No tonsillar erythema, exudates, or enlargement; Moist mucous membranes, Good dentition, No lesions  NECK: Supple, No JVD, Normal thyroid  NERVOUS SYSTEM:  Alert & Oriented X3, Good concentration; Motor Strength 5/5 B/L upper and lower extremities; DTRs 2+ intact and symmetric  CHEST/LUNG: Clear to percussion bilaterally; No rales, rhonchi, wheezing, or rubs  HEART: Regular rate and rhythm; No murmurs, rubs, or gallops  ABDOMEN: Soft, Nontender, Nondistended; Bowel sounds present  EXTREMITIES:  2+ Peripheral Pulses, No clubbing, cyanosis, or edema. foot bandaged- no surrounding cellulitis   LYMPH: No lymphadenopathy noted  SKIN: No rashes or lesions    Labs                        11.3   5.67  )-----------( 278      ( 16 Apr 2019 08:37 )             35.5     04-15    141  |  108  |  32<H>  ----------------------------<  258<H>  3.9   |  24  |  1.01    Ca    9.0      15 Apr 2019 04:11    PT/INR - ( 16 Apr 2019 08:37 )   PT: 11.0 sec;   INR: 0.98 ratio    PTT - ( 16 Apr 2019 08:37 )  PTT:30.0 sec
Patient is a 64y old  Male who presents with a chief complaint of foot infection (19 Apr 2019 15:57)      INTERVAL HPI/OVERNIGHT EVENTS:  pt. has no new complaints, wants to go home. no pain, no fevers or chills, appetite good, getting worried about bills at home      MEDICATIONS  (STANDING):  AQUAPHOR (petrolatum Ointment) 1 Application(s) Topical two times a day  dextrose 5% + sodium chloride 0.9%. 1000 milliLiter(s) (50 mL/Hr) IV Continuous <Continuous>  dextrose 5%. 1000 milliLiter(s) (50 mL/Hr) IV Continuous <Continuous>  dextrose 50% Injectable 12.5 Gram(s) IV Push once  dextrose 50% Injectable 25 Gram(s) IV Push once  dextrose 50% Injectable 25 Gram(s) IV Push once  insulin glargine Injectable (LANTUS) 30 Unit(s) SubCutaneous two times a day  insulin lispro (HumaLOG) corrective regimen sliding scale   SubCutaneous three times a day before meals  insulin lispro (HumaLOG) corrective regimen sliding scale   SubCutaneous at bedtime  insulin lispro Injectable (HumaLOG) 5 Unit(s) SubCutaneous three times a day before meals  lactobacillus acidophilus 1 Tablet(s) Oral three times a day  piperacillin/tazobactam IVPB. 3.375 Gram(s) IV Intermittent every 8 hours  vancomycin  IVPB 1000 milliGRAM(s) IV Intermittent every 8 hours    MEDICATIONS  (PRN):  acetaminophen   Tablet .. 650 milliGRAM(s) Oral every 6 hours PRN Mild Pain (1 - 3)  dextrose 40% Gel 15 Gram(s) Oral once PRN Blood Glucose LESS THAN 70 milliGRAM(s)/deciliter  glucagon  Injectable 1 milliGRAM(s) IntraMuscular once PRN Glucose LESS THAN 70 milligrams/deciliter  oxyCODONE    5 mG/acetaminophen 325 mG 1 Tablet(s) Oral every 4 hours PRN Moderate Pain (4 - 6)      Allergies    bananas (Rash)  Cauliflower (Other)  No Known Drug Allergies    Intolerances        REVIEW OF SYSTEMS:  CONSTITUTIONAL: No fever, weight loss, or fatigue  EYES: No eye pain, visual disturbances, or discharge  ENMT:  No difficulty hearing, tinnitus, vertigo; No sinus or throat pain  NECK: No pain or stiffness  RESPIRATORY: No cough, wheezing, chills or hemoptysis; No shortness of breath  CARDIOVASCULAR: No chest pain, palpitations, dizziness, or leg swelling  GASTROINTESTINAL: No abdominal or epigastric pain. No nausea, vomiting, or hematemesis; No diarrhea or constipation. No melena or hematochezia.  GENITOURINARY: No dysuria, frequency, hematuria, or incontinence  NEUROLOGICAL: No headaches, memory loss, loss of strength, numbness, or tremors  SKIN: No itching, burning, rashes, or lesions   MUSCULOSKELETAL: No joint pain or swelling; No muscle, back, or extremity pain  PSYCHIATRIC: No depression, anxiety, mood swings, or difficulty sleeping        Vital Signs Last 24 Hrs  T(C): 36.5 (22 Apr 2019 11:31), Max: 36.5 (22 Apr 2019 11:31)  T(F): 97.7 (22 Apr 2019 11:31), Max: 97.7 (22 Apr 2019 11:31)  HR: 84 (22 Apr 2019 14:40) (52 - 94)  BP: 120/71 (22 Apr 2019 14:40) (99/66 - 120/71)  BP(mean): --  RR: 18 (22 Apr 2019 14:40) (16 - 18)  SpO2: 100% (22 Apr 2019 14:40) (97% - 100%)    PHYSICAL EXAM:  GENERAL: NAD, thin  HEAD:  Atraumatic, Normocephalic  EYES: EOMI, PERRLA, conjunctiva and sclera clear  ENMT: No tonsillar erythema, exudates, or enlargement; Moist mucous membranes, Good dentition, No lesions  NERVOUS SYSTEM:  Alert & Oriented X3, Good concentration; Motor Strength 5/5 B/L upper and lower extremities; DTRs 2+ intact and symmetric  CHEST/LUNG: Clear to percussion bilaterally; No rales, rhonchi, wheezing, or rubs  HEART: Regular rate and rhythm; No murmurs, rubs, or gallops  ABDOMEN: Soft, Nontender, Nondistended; Bowel sounds present  EXTREMITIES:  2+ Peripheral Pulses, No clubbing, cyanosis, or edema      LABS:                        10.8   5.56  )-----------( 343      ( 22 Apr 2019 06:26 )             34.8     04-22    142  |  110<H>  |  30<H>  ----------------------------<  70  3.8   |  27  |  0.75    Ca    9.5      22 Apr 2019 06:26  Phos  3.4     04-21  Mg     2.2     04-21          CAPILLARY BLOOD GLUCOSE      POCT Blood Glucose.: 131 mg/dL (22 Apr 2019 11:40)  POCT Blood Glucose.: 116 mg/dL (22 Apr 2019 08:31)  POCT Blood Glucose.: 135 mg/dL (21 Apr 2019 21:05)      RADIOLOGY & ADDITIONAL TESTS:    Imaging Personally Reviewed:  [x ] YES  [ ] NO    Consultant(s) Notes Reviewed:  x[ ] YES  [ ] NO    Care Discussed with Consultants/Other Providers [ x] YES  [ ] NO

## 2019-04-22 NOTE — PROGRESS NOTE ADULT - PROBLEM SELECTOR PLAN 3
likely of chronic disease , check anemia labs
r/o DVT   order dopper
swelling resolving   US showed right cephalic vein thrombus

## 2019-04-22 NOTE — PROGRESS NOTE ADULT - PROBLEM SELECTOR PLAN 1
planned for fifth ray amputation in am   cont iv antibiotics for now  c/s reviewed (polymicrobial )  vanco level wnl
s/p fifth ray amputation  vanco level wnl  per Podiatry OR note: high suspicion for residual infection   though surgical path is pending and OR c/s neg so far  If path positive then do PICC and send on vanco /meropenem for remaining days of 6 weeks   awaiting to speak with Podiatry attending /resident : left message   if not d/c on oral antibiotics for few days
s/p fifth ray amputation  vanco level wnl  per Podiatry OR note: high suspicion for residual infection ,surgical path is pending and OR c/s neg so far  If path positive then do PICC and send on vanco /meropenem for remaining days of 6 weeks   spoke with Podiatry resident ,pt is a high rish for DVT had aright cephalic vein thrombus   will do PICC with remaining five weeks of antibiotics only if margins come positive   so far tissue c/s from OR neg  cont vanco and zosyn for now
s/p 5th ray resection  - POD #6  - flap is viable however the prognosis is guarded, grafts intact at this time  - according to OR findings high concern for residual osteomyelitis, OR path: bone margin no acute OM  await ID recs and ?need for midline vs PICC  - Pt is podiatrically stable for d/c once ID makes final abx recs  vanco trough good today  pt. unable to tolerate mri

## 2019-04-22 NOTE — PROGRESS NOTE ADULT - ASSESSMENT
· Assessment		  63 yo M s/p partial 5th ray resection (DOS 4/16)  - POD #6  - flap is viable however the prognosis is guarded, grafts intact at this time  - according to OR findings high concern for residual osteomyelitis, OR path: bone margin no acute OM  - ID recommending PICC line  - Pt is podiatrically stable for d/c once ID makes final abx recs  - Pt is to keep left foot dressing clean, dry, and intact at all times, first dressing change at first follow up appt  - Follow up with [facility] within 1 week of discharge call [phone] to schedule an appt  - Could also follow up at [facility] call [phone] to schedule an appt

## 2019-04-22 NOTE — PROGRESS NOTE ADULT - PROBLEM SELECTOR PLAN 2
cont meds
FS in better control , pt. will need fu with endocrine and PMD  Hgb A1C >15, uncontrolled DM,

## 2019-04-22 NOTE — PROGRESS NOTE ADULT - PROBLEM SELECTOR PROBLEM 1
Osteomyelitis of foot, left, acute
Type 2 diabetes mellitus with hyperglycemia, with long-term current use of insulin
Osteomyelitis of foot, left, acute

## 2019-04-22 NOTE — PROGRESS NOTE ADULT - ASSESSMENT
diabetic foot infection   cant assess for residual infection   refuses to stay   discharge on po antibiotics   or culture not back   discussed with dr castro  needs close outpt follow up   says will go to his PMD not us   i told the patient high risk of limb loss paulo as diabetic   wont stay

## 2019-04-23 ENCOUNTER — TRANSCRIPTION ENCOUNTER (OUTPATIENT)
Age: 64
End: 2019-04-23

## 2019-04-23 VITALS
SYSTOLIC BLOOD PRESSURE: 164 MMHG | RESPIRATION RATE: 18 BRPM | TEMPERATURE: 101 F | HEART RATE: 83 BPM | OXYGEN SATURATION: 99 % | DIASTOLIC BLOOD PRESSURE: 83 MMHG

## 2019-04-23 LAB
-  AMPICILLIN/SULBACTAM: SIGNIFICANT CHANGE UP
-  AMPICILLIN/SULBACTAM: SIGNIFICANT CHANGE UP
-  CEFAZOLIN: SIGNIFICANT CHANGE UP
-  CEFAZOLIN: SIGNIFICANT CHANGE UP
-  CLINDAMYCIN: SIGNIFICANT CHANGE UP
-  CLINDAMYCIN: SIGNIFICANT CHANGE UP
-  ERYTHROMYCIN: SIGNIFICANT CHANGE UP
-  ERYTHROMYCIN: SIGNIFICANT CHANGE UP
-  GENTAMICIN: SIGNIFICANT CHANGE UP
-  GENTAMICIN: SIGNIFICANT CHANGE UP
-  OXACILLIN: SIGNIFICANT CHANGE UP
-  OXACILLIN: SIGNIFICANT CHANGE UP
-  PENICILLIN: SIGNIFICANT CHANGE UP
-  RIFAMPIN: SIGNIFICANT CHANGE UP
-  RIFAMPIN: SIGNIFICANT CHANGE UP
-  TETRACYCLINE: SIGNIFICANT CHANGE UP
-  TETRACYCLINE: SIGNIFICANT CHANGE UP
-  TRIMETHOPRIM/SULFAMETHOXAZOLE: SIGNIFICANT CHANGE UP
-  TRIMETHOPRIM/SULFAMETHOXAZOLE: SIGNIFICANT CHANGE UP
-  VANCOMYCIN: SIGNIFICANT CHANGE UP
-  VANCOMYCIN: SIGNIFICANT CHANGE UP
24R-OH-CALCIDIOL SERPL-MCNC: 23.7 NG/ML — LOW (ref 30–80)
ANION GAP SERPL CALC-SCNC: 5 MMOL/L — SIGNIFICANT CHANGE UP (ref 5–17)
BASOPHILS # BLD AUTO: 0.03 K/UL — SIGNIFICANT CHANGE UP (ref 0–0.2)
BASOPHILS NFR BLD AUTO: 0.6 % — SIGNIFICANT CHANGE UP (ref 0–2)
BUN SERPL-MCNC: 30 MG/DL — HIGH (ref 7–23)
CALCIUM SERPL-MCNC: 9.8 MG/DL — SIGNIFICANT CHANGE UP (ref 8.5–10.1)
CHLORIDE SERPL-SCNC: 108 MMOL/L — SIGNIFICANT CHANGE UP (ref 96–108)
CO2 SERPL-SCNC: 27 MMOL/L — SIGNIFICANT CHANGE UP (ref 22–31)
CREAT SERPL-MCNC: 0.86 MG/DL — SIGNIFICANT CHANGE UP (ref 0.5–1.3)
CULTURE RESULTS: SIGNIFICANT CHANGE UP
EOSINOPHIL # BLD AUTO: 0.11 K/UL — SIGNIFICANT CHANGE UP (ref 0–0.5)
EOSINOPHIL NFR BLD AUTO: 2.1 % — SIGNIFICANT CHANGE UP (ref 0–6)
ERYTHROCYTE [SEDIMENTATION RATE] IN BLOOD: 72 MM/HR — HIGH (ref 0–20)
FERRITIN SERPL-MCNC: 564 NG/ML — HIGH (ref 30–400)
GLUCOSE BLDC GLUCOMTR-MCNC: 151 MG/DL — HIGH (ref 70–99)
GLUCOSE BLDC GLUCOMTR-MCNC: 168 MG/DL — HIGH (ref 70–99)
GLUCOSE BLDC GLUCOMTR-MCNC: 222 MG/DL — HIGH (ref 70–99)
GLUCOSE SERPL-MCNC: 139 MG/DL — HIGH (ref 70–99)
GRAM STN FLD: SIGNIFICANT CHANGE UP
HCT VFR BLD CALC: 36.5 % — LOW (ref 39–50)
HGB BLD-MCNC: 11.5 G/DL — LOW (ref 13–17)
IMM GRANULOCYTES NFR BLD AUTO: 0.4 % — SIGNIFICANT CHANGE UP (ref 0–1.5)
IRON SATN MFR SERPL: 98 UG/DL — SIGNIFICANT CHANGE UP (ref 45–165)
LYMPHOCYTES # BLD AUTO: 2.1 K/UL — SIGNIFICANT CHANGE UP (ref 1–3.3)
LYMPHOCYTES # BLD AUTO: 39.9 % — SIGNIFICANT CHANGE UP (ref 13–44)
MCHC RBC-ENTMCNC: 30.4 PG — SIGNIFICANT CHANGE UP (ref 27–34)
MCHC RBC-ENTMCNC: 31.5 GM/DL — LOW (ref 32–36)
MCV RBC AUTO: 96.6 FL — SIGNIFICANT CHANGE UP (ref 80–100)
METHOD TYPE: SIGNIFICANT CHANGE UP
METHOD TYPE: SIGNIFICANT CHANGE UP
MONOCYTES # BLD AUTO: 0.62 K/UL — SIGNIFICANT CHANGE UP (ref 0–0.9)
MONOCYTES NFR BLD AUTO: 11.8 % — SIGNIFICANT CHANGE UP (ref 2–14)
NEUTROPHILS # BLD AUTO: 2.38 K/UL — SIGNIFICANT CHANGE UP (ref 1.8–7.4)
NEUTROPHILS NFR BLD AUTO: 45.2 % — SIGNIFICANT CHANGE UP (ref 43–77)
NRBC # BLD: 0 /100 WBCS — SIGNIFICANT CHANGE UP (ref 0–0)
ORGANISM # SPEC MICROSCOPIC CNT: SIGNIFICANT CHANGE UP
PLATELET # BLD AUTO: 316 K/UL — SIGNIFICANT CHANGE UP (ref 150–400)
POTASSIUM SERPL-MCNC: 4.1 MMOL/L — SIGNIFICANT CHANGE UP (ref 3.5–5.3)
POTASSIUM SERPL-SCNC: 4.1 MMOL/L — SIGNIFICANT CHANGE UP (ref 3.5–5.3)
RBC # BLD: 3.78 M/UL — LOW (ref 4.2–5.8)
RBC # FLD: 14 % — SIGNIFICANT CHANGE UP (ref 10.3–14.5)
SODIUM SERPL-SCNC: 140 MMOL/L — SIGNIFICANT CHANGE UP (ref 135–145)
SPECIMEN SOURCE: SIGNIFICANT CHANGE UP
WBC # BLD: 5.26 K/UL — SIGNIFICANT CHANGE UP (ref 3.8–10.5)
WBC # FLD AUTO: 5.26 K/UL — SIGNIFICANT CHANGE UP (ref 3.8–10.5)

## 2019-04-23 PROCEDURE — 99239 HOSP IP/OBS DSCHRG MGMT >30: CPT

## 2019-04-23 RX ORDER — CHOLECALCIFEROL (VITAMIN D3) 125 MCG
1000 CAPSULE ORAL
Qty: 1 | Refills: 0
Start: 2019-04-23

## 2019-04-23 RX ORDER — INSULIN DETEMIR 100/ML (3)
32 INSULIN PEN (ML) SUBCUTANEOUS
Qty: 32 | Refills: 0
Start: 2019-04-23 | End: 2019-05-22

## 2019-04-23 RX ORDER — LACTOBACILLUS ACIDOPHILUS 100MM CELL
1 CAPSULE ORAL
Qty: 30 | Refills: 0
Start: 2019-04-23 | End: 2019-05-22

## 2019-04-23 RX ORDER — PETROLATUM,WHITE
1 JELLY (GRAM) TOPICAL
Qty: 1 | Refills: 0
Start: 2019-04-23

## 2019-04-23 RX ORDER — MOXIFLOXACIN HYDROCHLORIDE TABLETS, 400 MG 400 MG/1
1 TABLET, FILM COATED ORAL
Qty: 24 | Refills: 0
Start: 2019-04-23 | End: 2019-05-04

## 2019-04-23 RX ADMIN — INSULIN GLARGINE 30 UNIT(S): 100 INJECTION, SOLUTION SUBCUTANEOUS at 07:59

## 2019-04-23 RX ADMIN — Medication 5 UNIT(S): at 07:59

## 2019-04-23 RX ADMIN — Medication 1 TABLET(S): at 05:42

## 2019-04-23 RX ADMIN — Medication 4: at 16:44

## 2019-04-23 RX ADMIN — Medication 1 TABLET(S): at 14:31

## 2019-04-23 RX ADMIN — Medication 2: at 11:23

## 2019-04-23 RX ADMIN — OXYCODONE AND ACETAMINOPHEN 1 TABLET(S): 5; 325 TABLET ORAL at 05:50

## 2019-04-23 RX ADMIN — Medication 250 MILLIGRAM(S): at 14:22

## 2019-04-23 RX ADMIN — Medication 1 TABLET(S): at 14:22

## 2019-04-23 RX ADMIN — Medication 5 UNIT(S): at 11:23

## 2019-04-23 RX ADMIN — Medication 1000 UNIT(S): at 11:24

## 2019-04-23 RX ADMIN — PIPERACILLIN AND TAZOBACTAM 25 GRAM(S): 4; .5 INJECTION, POWDER, LYOPHILIZED, FOR SOLUTION INTRAVENOUS at 05:42

## 2019-04-23 RX ADMIN — Medication 5 UNIT(S): at 16:45

## 2019-04-23 RX ADMIN — Medication 2: at 07:58

## 2019-04-23 RX ADMIN — Medication 250 MILLIGRAM(S): at 05:42

## 2019-04-23 RX ADMIN — Medication 1 TABLET(S): at 08:39

## 2019-04-23 RX ADMIN — Medication 1 APPLICATION(S): at 05:52

## 2019-04-23 NOTE — DISCHARGE NOTE NURSING/CASE MANAGEMENT/SOCIAL WORK - NSDCDPATPORTLINK_GEN_ALL_CORE
You can access the [facility], offered by [facility], by registering with the following website: [email or web address]

## 2019-04-23 NOTE — CHART NOTE - NSCHARTNOTEFT_GEN_A_CORE
Medicine Hospitalist PA    Restarted pt IV abx as pt is staying now. As per RN pt was refusing to stay so PO abx were initiated, however now pt agreeing to stay will restart IV abx vanc/zosyn and dc cipro po.

## 2019-04-23 NOTE — DISCHARGE NOTE NURSING/CASE MANAGEMENT/SOCIAL WORK - NSDCPEEMAIL_GEN_ALL_CORE
[facility] for Tobacco Control email [email or web address]

## 2019-04-23 NOTE — CHART NOTE - NSCHARTNOTEFT_GEN_A_CORE
Assessment:   Pt c osteomyelitis, s/p 5th ray resection, POD #6, DM, c anemia, vitamin D deficiency.  Pt c street clothes on and stated that he is leaving today.   Pt adm c HbA1/GC>15.5%), pt was educated on nutrition therapy and blood glucose goals, pt appeared to need simple education methods, pt reported that he did not have glucose meter,  made aware.    Factors impacting intake: [ x] none [ ] nausea  [ ] vomiting [ ] diarrhea [ ] constipation  [ ]chewing problems [ ] swallowing issues  [ ] other:     Diet Prescription: Diet, Consistent Carbohydrate/No Snacks:   Supplement Feeding Modality:  Oral  Glucerna Shake Cans or Servings Per Day:  1       Frequency:  Three Times a day (04-16-19 @ 18:14)    Intake: 100% of meals & supplement    Current Weight: 04/22/19, 72.9 kg, 04/11/19, 66.1 kg, c wt. gain of 6.8 kg  % Weight Change: 10.3%  2+ edema of left foot noted, ? wt. accuracy     Nutrition focused physical exam conducted; Subcutaneous fat Exam;  [ severe ]  Orbital fat pads region,  [severe  ]Buccal fat region,  [ Moderate  ]triceps region, [ Moderate   ]ribs region.  Muscle Exam; [ severe ]temples region, [severe  ]clavicle region, [ severe ]shoulder region, [ Moderate  ]Scapula region, [ Severe  ]Interosseous region, [  unable ]thigh region, [  unable ]Calf region    Pertinent Medications: MEDICATIONS  (STANDING):  AQUAPHOR (petrolatum Ointment) 1 Application(s) Topical two times a day  cholecalciferol 1000 Unit(s) Oral daily  dextrose 5% + sodium chloride 0.9%. 1000 milliLiter(s) (50 mL/Hr) IV Continuous <Continuous>  dextrose 5%. 1000 milliLiter(s) (50 mL/Hr) IV Continuous <Continuous>  dextrose 50% Injectable 12.5 Gram(s) IV Push once  dextrose 50% Injectable 25 Gram(s) IV Push once  dextrose 50% Injectable 25 Gram(s) IV Push once  insulin glargine Injectable (LANTUS) 30 Unit(s) SubCutaneous two times a day  insulin lispro (HumaLOG) corrective regimen sliding scale   SubCutaneous three times a day before meals  insulin lispro (HumaLOG) corrective regimen sliding scale   SubCutaneous at bedtime  insulin lispro Injectable (HumaLOG) 5 Unit(s) SubCutaneous three times a day before meals  lactobacillus acidophilus 1 Tablet(s) Oral three times a day  piperacillin/tazobactam IVPB. 3.375 Gram(s) IV Intermittent every 8 hours  trimethoprim  160 mG/sulfamethoxazole 800 mG 1 Tablet(s) Oral <User Schedule>  vancomycin  IVPB 1000 milliGRAM(s) IV Intermittent every 8 hours    MEDICATIONS  (PRN):  acetaminophen   Tablet .. 650 milliGRAM(s) Oral every 6 hours PRN Mild Pain (1 - 3)  dextrose 40% Gel 15 Gram(s) Oral once PRN Blood Glucose LESS THAN 70 milliGRAM(s)/deciliter  glucagon  Injectable 1 milliGRAM(s) IntraMuscular once PRN Glucose LESS THAN 70 milligrams/deciliter  oxyCODONE    5 mG/acetaminophen 325 mG 1 Tablet(s) Oral every 4 hours PRN Moderate Pain (4 - 6)    Pertinent Labs: 04-23 Na140 mmol/L Glu 139 mg/dL<H> K+ 4.1 mmol/L Cr  0.86 mg/dL BUN 30 mg/dL<H> 04-21 Phos 3.4 mg/dL 04-12 [id number] >15.5 %<H>     CAPILLARY BLOOD GLUCOSE      POCT Blood Glucose.: 151 mg/dL (23 Apr 2019 11:00)  POCT Blood Glucose.: 168 mg/dL (23 Apr 2019 07:54)  POCT Blood Glucose.: 216 mg/dL (22 Apr 2019 22:33)  POCT Blood Glucose.: 146 mg/dL (22 Apr 2019 16:56)    Skin: wound x 3 noted    Estimated Needs:   [x ] no change since previous assessment(04/15/19)  [ ] recalculated:     Previous Nutrition Diagnosis:   [ ] Inadequate Energy Intake [ ]Inadequate Oral Intake [ ] Excessive Energy Intake   [ ] Underweight [ ] Increased Nutrient Needs [ ] Overweight/Obesity   [ ] Altered GI Function [ ] Unintended Weight Loss [ ] Food & Nutrition Related Knowledge Deficit [ x] Malnutrition; moderate malnutrition in context of chronic illness       Nutrition Diagnosis is [ ] ongoing  [ ] resolved [ x] not applicable (see new dx below)      New Nutrition Diagnosis:   [ ] Inadequate Energy Intake [ ]Inadequate Oral Intake [ ] Excessive Energy Intake   [ ] Underweight [ ] Increased Nutrient Needs [ ] Overweight/Obesity   [ ] Altered GI Function [ ] Unintended Weight Loss [ ] Food & Nutrition Related Knowledge Deficit [x ] Malnutrition; moderate malnutrition in context of chronic illness     Related to: inadequate protein-energy intake/impaired nutrient utilization in setting of of hx of uncontrolled DM, osteomyelitis     As evidenced by: severe fat & muscle wasting     Goal: pt to consume >75% of meals/supplement; met       Interventions:   Recommend  [x] Continue c current diet regimen   [ ] Change Diet To:  [ ] Nutrition Supplement  [ ] Nutrition Support  [ ] Other:     Monitoring and Evaluation:   [ x] PO intake [ x ] Tolerance to diet prescription [ x ] weights [ x ] labs[ x ] follow up per protocol  [ ] other:

## 2019-04-23 NOTE — DISCHARGE NOTE NURSING/CASE MANAGEMENT/SOCIAL WORK - NSDCPEWEB_GEN_ALL_CORE
[facility] for Tobacco Control website --- [email or web address] website --- [email or web address]

## 2019-04-23 NOTE — DISCHARGE NOTE NURSING/CASE MANAGEMENT/SOCIAL WORK - NSDCFUADDAPPT_GEN_ALL_CORE_FT
Podiatry Discharge Instructions:  Follow up: Please follow up with Dr. Barrios within 1 week of discharge from the hospital, please call [phone] for appointment and discuss that you recently were seen in the hospital.  Wound Care: Please leave your dressing clean dry intact until your follow up appointment   Weight bearing: Please weight bear as tolerated in a surgical shoe.  Antibiotics: Please continue as instructed.    follow-up with your primary care doctor within one week

## 2019-04-23 NOTE — DISCHARGE NOTE NURSING/CASE MANAGEMENT/SOCIAL WORK - NSDCPNDISPN_GEN_ALL_CORE
Activities of daily living, including home environment that might     exacerbate pain or reduce effectiveness of the pain management plan of care as well as strategies to address these issues/Safe use, storage and disposal of opioids when prescribed/Opioids not applicable/not prescribed/Education provided on the pain management plan of care/Side effects of pain management treatment

## 2019-05-01 DIAGNOSIS — E11.621 TYPE 2 DIABETES MELLITUS WITH FOOT ULCER: ICD-10-CM

## 2019-05-01 DIAGNOSIS — E11.59 TYPE 2 DIABETES MELLITUS WITH OTHER CIRCULATORY COMPLICATIONS: ICD-10-CM

## 2019-05-01 DIAGNOSIS — E55.9 VITAMIN D DEFICIENCY, UNSPECIFIED: ICD-10-CM

## 2019-05-01 DIAGNOSIS — M86.172 OTHER ACUTE OSTEOMYELITIS, LEFT ANKLE AND FOOT: ICD-10-CM

## 2019-05-01 DIAGNOSIS — L97.526 NON-PRESSURE CHRONIC ULCER OF OTHER PART OF LEFT FOOT WITH BONE INVOLVEMENT WITHOUT EVIDENCE OF NECROSIS: ICD-10-CM

## 2019-05-01 DIAGNOSIS — E11.69 TYPE 2 DIABETES MELLITUS WITH OTHER SPECIFIED COMPLICATION: ICD-10-CM

## 2019-05-01 DIAGNOSIS — E11.65 TYPE 2 DIABETES MELLITUS WITH HYPERGLYCEMIA: ICD-10-CM

## 2019-05-01 DIAGNOSIS — I82.611 ACUTE EMBOLISM AND THROMBOSIS OF SUPERFICIAL VEINS OF RIGHT UPPER EXTREMITY: ICD-10-CM

## 2019-05-01 DIAGNOSIS — D63.8 ANEMIA IN OTHER CHRONIC DISEASES CLASSIFIED ELSEWHERE: ICD-10-CM

## 2019-05-01 DIAGNOSIS — F17.200 NICOTINE DEPENDENCE, UNSPECIFIED, UNCOMPLICATED: ICD-10-CM

## 2019-05-01 DIAGNOSIS — E43 UNSPECIFIED SEVERE PROTEIN-CALORIE MALNUTRITION: ICD-10-CM

## 2019-05-01 DIAGNOSIS — Z91.14 PATIENT'S OTHER NONCOMPLIANCE WITH MEDICATION REGIMEN: ICD-10-CM

## 2019-05-01 DIAGNOSIS — M54.5 LOW BACK PAIN: ICD-10-CM

## 2019-05-01 DIAGNOSIS — Z79.4 LONG TERM (CURRENT) USE OF INSULIN: ICD-10-CM

## 2019-05-01 DIAGNOSIS — L02.612 CUTANEOUS ABSCESS OF LEFT FOOT: ICD-10-CM

## 2019-05-01 DIAGNOSIS — B95.7 OTHER STAPHYLOCOCCUS AS THE CAUSE OF DISEASES CLASSIFIED ELSEWHERE: ICD-10-CM

## 2019-05-01 DIAGNOSIS — E11.00 TYPE 2 DIABETES MELLITUS WITH HYPEROSMOLARITY WITHOUT NONKETOTIC HYPERGLYCEMIC-HYPEROSMOLAR COMA (NKHHC): ICD-10-CM

## 2021-01-19 ENCOUNTER — INPATIENT (INPATIENT)
Facility: HOSPITAL | Age: 66
LOS: 3 days | Discharge: HOME | End: 2021-01-23
Attending: GENERAL ACUTE CARE HOSPITAL | Admitting: GENERAL ACUTE CARE HOSPITAL
Payer: MEDICARE

## 2021-01-19 VITALS
WEIGHT: 179.9 LBS | OXYGEN SATURATION: 99 % | SYSTOLIC BLOOD PRESSURE: 114 MMHG | RESPIRATION RATE: 19 BRPM | HEART RATE: 77 BPM | HEIGHT: 76 IN | DIASTOLIC BLOOD PRESSURE: 76 MMHG | TEMPERATURE: 98 F

## 2021-01-19 DIAGNOSIS — E11.9 TYPE 2 DIABETES MELLITUS WITHOUT COMPLICATIONS: ICD-10-CM

## 2021-01-19 DIAGNOSIS — S98.912A COMPLETE TRAUMATIC AMPUTATION OF LEFT FOOT, LEVEL UNSPECIFIED, INITIAL ENCOUNTER: Chronic | ICD-10-CM

## 2021-01-19 DIAGNOSIS — E11.621 TYPE 2 DIABETES MELLITUS WITH FOOT ULCER: ICD-10-CM

## 2021-01-19 LAB
ALBUMIN SERPL ELPH-MCNC: 2.5 G/DL — LOW (ref 3.3–5)
ALP SERPL-CCNC: 106 U/L — SIGNIFICANT CHANGE UP (ref 40–120)
ALT FLD-CCNC: 20 U/L — SIGNIFICANT CHANGE UP (ref 12–78)
ANION GAP SERPL CALC-SCNC: 5 MMOL/L — SIGNIFICANT CHANGE UP (ref 5–17)
AST SERPL-CCNC: 14 U/L — LOW (ref 15–37)
BASOPHILS # BLD AUTO: 0.03 K/UL — SIGNIFICANT CHANGE UP (ref 0–0.2)
BASOPHILS NFR BLD AUTO: 0.2 % — SIGNIFICANT CHANGE UP (ref 0–2)
BILIRUB SERPL-MCNC: 0.2 MG/DL — SIGNIFICANT CHANGE UP (ref 0.2–1.2)
BUN SERPL-MCNC: 21 MG/DL — SIGNIFICANT CHANGE UP (ref 7–23)
CALCIUM SERPL-MCNC: 9.4 MG/DL — SIGNIFICANT CHANGE UP (ref 8.5–10.1)
CHLORIDE SERPL-SCNC: 106 MMOL/L — SIGNIFICANT CHANGE UP (ref 96–108)
CO2 SERPL-SCNC: 27 MMOL/L — SIGNIFICANT CHANGE UP (ref 22–31)
CREAT SERPL-MCNC: 1.05 MG/DL — SIGNIFICANT CHANGE UP (ref 0.5–1.3)
EOSINOPHIL # BLD AUTO: 0.21 K/UL — SIGNIFICANT CHANGE UP (ref 0–0.5)
EOSINOPHIL NFR BLD AUTO: 1.7 % — SIGNIFICANT CHANGE UP (ref 0–6)
ERYTHROCYTE [SEDIMENTATION RATE] IN BLOOD: 114 MM/HR — HIGH (ref 0–20)
FLUAV AG NPH QL: SIGNIFICANT CHANGE UP COUNTS
FLUBV AG NPH QL: SIGNIFICANT CHANGE UP COUNTS
GLUCOSE BLDC GLUCOMTR-MCNC: 203 MG/DL — HIGH (ref 70–99)
GLUCOSE SERPL-MCNC: 195 MG/DL — HIGH (ref 70–99)
HCT VFR BLD CALC: 28.9 % — LOW (ref 39–50)
HGB BLD-MCNC: 9.3 G/DL — LOW (ref 13–17)
IMM GRANULOCYTES NFR BLD AUTO: 0.5 % — SIGNIFICANT CHANGE UP (ref 0–1.5)
LYMPHOCYTES # BLD AUTO: 16.7 % — SIGNIFICANT CHANGE UP (ref 13–44)
LYMPHOCYTES # BLD AUTO: 2.09 K/UL — SIGNIFICANT CHANGE UP (ref 1–3.3)
MCHC RBC-ENTMCNC: 30.2 PG — SIGNIFICANT CHANGE UP (ref 27–34)
MCHC RBC-ENTMCNC: 32.2 GM/DL — SIGNIFICANT CHANGE UP (ref 32–36)
MCV RBC AUTO: 93.8 FL — SIGNIFICANT CHANGE UP (ref 80–100)
MONOCYTES # BLD AUTO: 1.16 K/UL — HIGH (ref 0–0.9)
MONOCYTES NFR BLD AUTO: 9.3 % — SIGNIFICANT CHANGE UP (ref 2–14)
NEUTROPHILS # BLD AUTO: 8.99 K/UL — HIGH (ref 1.8–7.4)
NEUTROPHILS NFR BLD AUTO: 71.6 % — SIGNIFICANT CHANGE UP (ref 43–77)
NRBC # BLD: 0 /100 WBCS — SIGNIFICANT CHANGE UP (ref 0–0)
PLATELET # BLD AUTO: 286 K/UL — SIGNIFICANT CHANGE UP (ref 150–400)
POTASSIUM SERPL-MCNC: 4.6 MMOL/L — SIGNIFICANT CHANGE UP (ref 3.5–5.3)
POTASSIUM SERPL-SCNC: 4.6 MMOL/L — SIGNIFICANT CHANGE UP (ref 3.5–5.3)
PROT SERPL-MCNC: 8.2 GM/DL — SIGNIFICANT CHANGE UP (ref 6–8.3)
RBC # BLD: 3.08 M/UL — LOW (ref 4.2–5.8)
RBC # FLD: 14.6 % — HIGH (ref 10.3–14.5)
RSV RNA NPH QL NAA+NON-PROBE: SIGNIFICANT CHANGE UP COUNTS
SARS-COV-2 RNA SPEC QL NAA+PROBE: SIGNIFICANT CHANGE UP COUNTS
SODIUM SERPL-SCNC: 138 MMOL/L — SIGNIFICANT CHANGE UP (ref 135–145)
WBC # BLD: 12.54 K/UL — HIGH (ref 3.8–10.5)
WBC # FLD AUTO: 12.54 K/UL — HIGH (ref 3.8–10.5)

## 2021-01-19 PROCEDURE — 99285 EMERGENCY DEPT VISIT HI MDM: CPT

## 2021-01-19 PROCEDURE — 99223 1ST HOSP IP/OBS HIGH 75: CPT

## 2021-01-19 PROCEDURE — 73620 X-RAY EXAM OF FOOT: CPT | Mod: 26,RT

## 2021-01-19 RX ORDER — VANCOMYCIN HCL 1 G
1000 VIAL (EA) INTRAVENOUS ONCE
Refills: 0 | Status: COMPLETED | OUTPATIENT
Start: 2021-01-19 | End: 2021-01-19

## 2021-01-19 RX ORDER — PIPERACILLIN AND TAZOBACTAM 4; .5 G/20ML; G/20ML
3.38 INJECTION, POWDER, LYOPHILIZED, FOR SOLUTION INTRAVENOUS ONCE
Refills: 0 | Status: COMPLETED | OUTPATIENT
Start: 2021-01-19 | End: 2021-01-19

## 2021-01-19 RX ORDER — PIPERACILLIN AND TAZOBACTAM 4; .5 G/20ML; G/20ML
3.38 INJECTION, POWDER, LYOPHILIZED, FOR SOLUTION INTRAVENOUS EVERY 8 HOURS
Refills: 0 | Status: DISCONTINUED | OUTPATIENT
Start: 2021-01-19 | End: 2021-01-21

## 2021-01-19 RX ORDER — VANCOMYCIN HCL 1 G
1250 VIAL (EA) INTRAVENOUS EVERY 12 HOURS
Refills: 0 | Status: DISCONTINUED | OUTPATIENT
Start: 2021-01-19 | End: 2021-01-22

## 2021-01-19 RX ORDER — MUPIROCIN 20 MG/G
1 OINTMENT TOPICAL ONCE
Refills: 0 | Status: DISCONTINUED | OUTPATIENT
Start: 2021-01-19 | End: 2021-01-21

## 2021-01-19 RX ADMIN — Medication 166.67 MILLIGRAM(S): at 18:49

## 2021-01-19 RX ADMIN — PIPERACILLIN AND TAZOBACTAM 200 GRAM(S): 4; .5 INJECTION, POWDER, LYOPHILIZED, FOR SOLUTION INTRAVENOUS at 14:59

## 2021-01-19 RX ADMIN — Medication 250 MILLIGRAM(S): at 16:20

## 2021-01-19 RX ADMIN — PIPERACILLIN AND TAZOBACTAM 3.38 GRAM(S): 4; .5 INJECTION, POWDER, LYOPHILIZED, FOR SOLUTION INTRAVENOUS at 16:07

## 2021-01-19 NOTE — H&P ADULT - NSHPREVIEWOFSYSTEMS_GEN_ALL_CORE
----- Message from Charu Carlton sent at 9/6/2018  3:54 PM CDT -----  Contact: Pt  Pt called to schedule appt and would like Oct 11 at 1:30pm  Callback#[phone]  Thank You  SERAFIN Carlton   CONSTITUTIONAL: No fever, weight loss, or fatigue  EYES: No eye pain, visual disturbances, or discharge  ENMT:  No difficulty hearing, tinnitus, vertigo; No sinus or throat pain  NECK: No pain or stiffness  RESPIRATORY: No cough, wheezing, chills or hemoptysis; No shortness of breath  CARDIOVASCULAR: No chest pain, palpitations, dizziness, or leg swelling  GASTROINTESTINAL: No abdominal or epigastric pain. No nausea, vomiting, or hematemesis; No diarrhea or constipation. No melena or hematochezia.  GENITOURINARY: No dysuria, frequency, hematuria, or incontinence  NEUROLOGICAL: No headaches, memory loss, loss of strength, numbness, or tremors  SKIN: No itching, burning, rashes, or lesions   LYMPH NODES: No enlarged glands  ENDOCRINE: No heat or cold intolerance; No hair loss  MUSCULOSKELETAL: No joint pain or swelling; No muscle, back, or extremity pain  PSYCHIATRIC: No depression, anxiety, mood swings, or difficulty sleeping  HEME/LYMPH: No easy bruising, or bleeding gums  ALLERGY AND IMMUNOLOGIC: No hives or eczema CONSTITUTIONAL: No fever, weight loss, or fatigue  EYES: No eye pain, visual disturbances, or discharge  ENMT:  No difficulty hearing, tinnitus, vertigo; No sinus or throat pain  NECK: No pain or stiffness  RESPIRATORY: No cough, wheezing, chills or hemoptysis; No shortness of breath  CARDIOVASCULAR: No chest pain, palpitations, dizziness, or leg swelling  GASTROINTESTINAL: No abdominal or epigastric pain. No nausea, vomiting, or hematemesis; No diarrhea or constipation. No melena or hematochezia.  GENITOURINARY: No dysuria, frequency, hematuria, or incontinence  NEUROLOGICAL: No headaches, memory loss, loss of strength, numbness, or tremors  SKIN: No itching, burning, rashes, or lesions   LYMPH NODES: No enlarged glands  ENDOCRINE: No heat or cold intolerance; No hair loss  MUSCULOSKELETAL:left fooot pain   PSYCHIATRIC: No depression, anxiety, mood swings, or difficulty sleeping  HEME/LYMPH: No easy bruising, or bleeding gums  ALLERGY AND IMMUNOLOGIC: No hives or eczema

## 2021-01-19 NOTE — ED PROVIDER NOTE - OTHER RECORDS SUMMARY FREE TEXT FOR MDM OBTAINED AND REVIEWED OLD RECORDS QUESTION
Prescription from Ellie Riley DPM: reports patient with poorly controlled diabetes and right foot pain and acute edema sent to ER for consult and management

## 2021-01-19 NOTE — ED PROVIDER NOTE - CLINICAL SUMMARY MEDICAL DECISION MAKING FREE TEXT BOX
65 year old with diabetic foot wound 65 year old man with diabetic foot wound evaluated by Orthopedic debridement at bedside to bone suspicious for osteomyelitis.  Patient admitted.

## 2021-01-19 NOTE — ED PROVIDER NOTE - OBJECTIVE STATEMENT
This patient is a 65 year old man hx of DM who presents to the ER c/o right foot wound.  Patient states that the wound has been present for 1 week.  He denies fever or trauma to the foot.  He was seen by his podiatrist yesterday and states that Xrays were done but he has no results.

## 2021-01-19 NOTE — CONSULT NOTE ADULT - ASSESSMENT
64 y/o M right foot 5th met wound to bone, and 5th toe wound to SubQ  - pt seen and evaluated  - afebrile  - right foot submet 5 wound to bone, no purulence, no drainage, no erythema, no malodor, etiology: diabetic neuropathy, and 5th toe lateral wound to SubQ  - wound debrided using sterile #15 blade down to Sub Q but not beyond  - rec admit for IV Abx  - ordered MRI  - ordered Mupirocin  - will likely need right foot partial 5th ray amputation pending MRI results  - please document medical clearance for surgery and anesthesia under local w/ sedation  - will follow  - discussed w/ attending

## 2021-01-19 NOTE — ED PROVIDER NOTE - PROGRESS NOTE DETAILS
Podiatry made aware. Podiatry has seen patient debridement done at bedside clinical suspicion for osteo.  Recommend patient be started on antibiotic and admitted to medicine.

## 2021-01-19 NOTE — CONSULT NOTE ADULT - SUBJECTIVE AND OBJECTIVE BOX
Podiatry pager #: [phone] ([place])/ [phone] ([facility])    Patient is a 65y old  Male who presents with a chief complaint of right foot wound.    HPI: This patient is a 65 year old man hx of DM who presents to the ER c/o right foot wound.  Patient states that the wound has been present for 1 week.  He denies fever or trauma to the foot.  He was seen by his podiatrist yesterday and states that Xrays were done but he has no results.      PAST MEDICAL & SURGICAL HISTORY:  Diabetes    Amputation of left foot  left pink        MEDICATIONS  (STANDING):  piperacillin/tazobactam IVPB. 3.375 Gram(s) IV Intermittent Once  vancomycin  IVPB 1000 milliGRAM(s) IV Intermittent once    MEDICATIONS  (PRN):      Allergies    bananas (Rash)  Cauliflower (Other)  No Known Drug Allergies    Intolerances        VITALS:    Vital Signs Last 24 Hrs  T(C): 36.6 (19 Jan 2021 08:25), Max: 36.6 (19 Jan 2021 08:25)  T(F): 97.8 (19 Jan 2021 08:25), Max: 97.8 (19 Jan 2021 08:25)  HR: 77 (19 Jan 2021 08:25) (77 - 77)  BP: 114/76 (19 Jan 2021 08:25) (114/76 - 114/76)  BP(mean): --  RR: 19 (19 Jan 2021 08:25) (19 - 19)  SpO2: 99% (19 Jan 2021 08:25) (99% - 99%)    LABS:                CAPILLARY BLOOD GLUCOSE      POCT Blood Glucose.: 203 mg/dL (19 Jan 2021 11:24)          LOWER EXTREMITY PHYSICAL EXAM:    Vascular: DP/PT 2/4 B/L, CFT <3 seconds B/L, Temperature gradient warm to cool B/L  Neuro: Epicritic sensation intact to the level of ankle B/L  Musculoskeletal/Ortho: left foot partial 5th ray resection healed  Skin:  Wound #1: right foot submet 5 wound to bone, no purulence, no drainage, no erythema, no malodor, etiology: diabetic neuropathy, and 5th toe lateral wound to SubQ      RADIOLOGY & ADDITIONAL STUDIES:  < from: Xray Foot AP + Lateral, Right (01.19.21 @ 10:33) >    EXAM:  FOOT 2VIEWS RT                            PROCEDURE DATE:  01/19/2021          INTERPRETATION:  Right foot wound.    3 views right foot.    No fracture dislocation focal bone lysis or unusual periosteal reaction. Joint spaces preserved. Moderate plantar calcaneal osteophyte. No soft tissue gas.    Impression: No acute or destructive osseous pathology.            VASHTI SNYDER MD; Attending Radiologist  This document has been electronically signed. Jan 19 2021 12:31PM    < end of copied text >

## 2021-01-19 NOTE — ED ADULT NURSE NOTE - OBJECTIVE STATEMENT
Received pt sitting on stretcher alert and oriented x4  c/o pain and swelling to the Rt foot x 2weeks, pt noted to have +swelling under the Rt foot plus unsteageable ulcer under the rt big toe , +amputation to the left pinky toe, HX of DM on insulin,  pt denies any fever chills denies any sick contact

## 2021-01-19 NOTE — H&P ADULT - ASSESSMENT
65 years oldf male with diabetes foot ulcer - podiatry consult appreciqated       IMPROVE VTE Individual Risk Assessment    RISK                                                          Points  [] Previous VTE                                           3  [] Thrombophilia                                        2  [] Lower limb paralysis                              2   [] Current Cancer                                       2   [] Immobilization > 24 hrs                        1  [] ICU/CCU stay > 24 hours                       1  [] Age > 60                                                   1    IMPROVE VTE Score:2

## 2021-01-20 ENCOUNTER — TRANSCRIPTION ENCOUNTER (OUTPATIENT)
Age: 66
End: 2021-01-20

## 2021-01-20 LAB
AMPHET UR-MCNC: NEGATIVE — SIGNIFICANT CHANGE UP
ANION GAP SERPL CALC-SCNC: 7 MMOL/L — SIGNIFICANT CHANGE UP (ref 5–17)
BARBITURATES UR SCN-MCNC: NEGATIVE — SIGNIFICANT CHANGE UP
BENZODIAZ UR-MCNC: NEGATIVE — SIGNIFICANT CHANGE UP
BUN SERPL-MCNC: 20 MG/DL — SIGNIFICANT CHANGE UP (ref 7–23)
CALCIUM SERPL-MCNC: 8.9 MG/DL — SIGNIFICANT CHANGE UP (ref 8.5–10.1)
CHLORIDE SERPL-SCNC: 106 MMOL/L — SIGNIFICANT CHANGE UP (ref 96–108)
CO2 SERPL-SCNC: 24 MMOL/L — SIGNIFICANT CHANGE UP (ref 22–31)
COCAINE METAB.OTHER UR-MCNC: NEGATIVE — SIGNIFICANT CHANGE UP
CREAT SERPL-MCNC: 1.07 MG/DL — SIGNIFICANT CHANGE UP (ref 0.5–1.3)
CRP SERPL-MCNC: 8.55 MG/DL — HIGH (ref 0–0.4)
GLUCOSE BLDC GLUCOMTR-MCNC: 264 MG/DL — HIGH (ref 70–99)
GLUCOSE BLDC GLUCOMTR-MCNC: 309 MG/DL — HIGH (ref 70–99)
GLUCOSE BLDC GLUCOMTR-MCNC: 429 MG/DL — HIGH (ref 70–99)
GLUCOSE BLDC GLUCOMTR-MCNC: 444 MG/DL — HIGH (ref 70–99)
GLUCOSE BLDC GLUCOMTR-MCNC: 72 MG/DL — SIGNIFICANT CHANGE UP (ref 70–99)
GLUCOSE BLDC GLUCOMTR-MCNC: 80 MG/DL — SIGNIFICANT CHANGE UP (ref 70–99)
GLUCOSE SERPL-MCNC: 404 MG/DL — HIGH (ref 70–99)
HCT VFR BLD CALC: 25.8 % — LOW (ref 39–50)
HGB BLD-MCNC: 8.2 G/DL — LOW (ref 13–17)
MCHC RBC-ENTMCNC: 30.3 PG — SIGNIFICANT CHANGE UP (ref 27–34)
MCHC RBC-ENTMCNC: 31.8 GM/DL — LOW (ref 32–36)
MCV RBC AUTO: 95.2 FL — SIGNIFICANT CHANGE UP (ref 80–100)
METHADONE UR-MCNC: NEGATIVE — SIGNIFICANT CHANGE UP
NRBC # BLD: 0 /100 WBCS — SIGNIFICANT CHANGE UP (ref 0–0)
OPIATES UR-MCNC: POSITIVE — SIGNIFICANT CHANGE UP
PCP SPEC-MCNC: SIGNIFICANT CHANGE UP
PCP UR-MCNC: NEGATIVE — SIGNIFICANT CHANGE UP
PLATELET # BLD AUTO: 273 K/UL — SIGNIFICANT CHANGE UP (ref 150–400)
POTASSIUM SERPL-MCNC: 4.7 MMOL/L — SIGNIFICANT CHANGE UP (ref 3.5–5.3)
POTASSIUM SERPL-SCNC: 4.7 MMOL/L — SIGNIFICANT CHANGE UP (ref 3.5–5.3)
RBC # BLD: 2.71 M/UL — LOW (ref 4.2–5.8)
RBC # FLD: 14.5 % — SIGNIFICANT CHANGE UP (ref 10.3–14.5)
SODIUM SERPL-SCNC: 137 MMOL/L — SIGNIFICANT CHANGE UP (ref 135–145)
THC UR QL: NEGATIVE — SIGNIFICANT CHANGE UP
VANCOMYCIN TROUGH SERPL-MCNC: 17.6 UG/ML — SIGNIFICANT CHANGE UP (ref 10–20)
WBC # BLD: 9.45 K/UL — SIGNIFICANT CHANGE UP (ref 3.8–10.5)
WBC # FLD AUTO: 9.45 K/UL — SIGNIFICANT CHANGE UP (ref 3.8–10.5)

## 2021-01-20 PROCEDURE — 99233 SBSQ HOSP IP/OBS HIGH 50: CPT

## 2021-01-20 PROCEDURE — 99223 1ST HOSP IP/OBS HIGH 75: CPT

## 2021-01-20 PROCEDURE — 73721 MRI JNT OF LWR EXTRE W/O DYE: CPT | Mod: 26,RT

## 2021-01-20 RX ORDER — DEXTROSE 50 % IN WATER 50 %
25 SYRINGE (ML) INTRAVENOUS ONCE
Refills: 0 | Status: DISCONTINUED | OUTPATIENT
Start: 2021-01-20 | End: 2021-01-23

## 2021-01-20 RX ORDER — ENOXAPARIN SODIUM 100 MG/ML
40 INJECTION SUBCUTANEOUS AT BEDTIME
Refills: 0 | Status: DISCONTINUED | OUTPATIENT
Start: 2021-01-20 | End: 2021-01-23

## 2021-01-20 RX ORDER — GLUCAGON INJECTION, SOLUTION 0.5 MG/.1ML
1 INJECTION, SOLUTION SUBCUTANEOUS ONCE
Refills: 0 | Status: DISCONTINUED | OUTPATIENT
Start: 2021-01-20 | End: 2021-01-23

## 2021-01-20 RX ORDER — TRAMADOL HYDROCHLORIDE 50 MG/1
50 TABLET ORAL EVERY 8 HOURS
Refills: 0 | Status: DISCONTINUED | OUTPATIENT
Start: 2021-01-20 | End: 2021-01-23

## 2021-01-20 RX ORDER — OXYCODONE HYDROCHLORIDE 5 MG/1
5 TABLET ORAL ONCE
Refills: 0 | Status: DISCONTINUED | OUTPATIENT
Start: 2021-01-20 | End: 2021-01-20

## 2021-01-20 RX ORDER — DEXTROSE 50 % IN WATER 50 %
15 SYRINGE (ML) INTRAVENOUS ONCE
Refills: 0 | Status: DISCONTINUED | OUTPATIENT
Start: 2021-01-20 | End: 2021-01-23

## 2021-01-20 RX ORDER — INSULIN LISPRO 100/ML
VIAL (ML) SUBCUTANEOUS
Refills: 0 | Status: DISCONTINUED | OUTPATIENT
Start: 2021-01-20 | End: 2021-01-23

## 2021-01-20 RX ORDER — ACETAMINOPHEN 500 MG
650 TABLET ORAL EVERY 6 HOURS
Refills: 0 | Status: DISCONTINUED | OUTPATIENT
Start: 2021-01-20 | End: 2021-01-23

## 2021-01-20 RX ORDER — INSULIN GLARGINE 100 [IU]/ML
15 INJECTION, SOLUTION SUBCUTANEOUS
Refills: 0 | Status: DISCONTINUED | OUTPATIENT
Start: 2021-01-20 | End: 2021-01-21

## 2021-01-20 RX ORDER — INSULIN LISPRO 100/ML
5 VIAL (ML) SUBCUTANEOUS
Refills: 0 | Status: DISCONTINUED | OUTPATIENT
Start: 2021-01-20 | End: 2021-01-22

## 2021-01-20 RX ORDER — INSULIN LISPRO 100/ML
VIAL (ML) SUBCUTANEOUS AT BEDTIME
Refills: 0 | Status: DISCONTINUED | OUTPATIENT
Start: 2021-01-20 | End: 2021-01-22

## 2021-01-20 RX ORDER — INSULIN LISPRO 100/ML
5 VIAL (ML) SUBCUTANEOUS ONCE
Refills: 0 | Status: COMPLETED | OUTPATIENT
Start: 2021-01-20 | End: 2021-01-20

## 2021-01-20 RX ORDER — DEXTROSE 50 % IN WATER 50 %
12.5 SYRINGE (ML) INTRAVENOUS ONCE
Refills: 0 | Status: DISCONTINUED | OUTPATIENT
Start: 2021-01-20 | End: 2021-01-23

## 2021-01-20 RX ORDER — SODIUM CHLORIDE 9 MG/ML
1000 INJECTION, SOLUTION INTRAVENOUS
Refills: 0 | Status: DISCONTINUED | OUTPATIENT
Start: 2021-01-20 | End: 2021-01-23

## 2021-01-20 RX ORDER — MORPHINE SULFATE 50 MG/1
2 CAPSULE, EXTENDED RELEASE ORAL EVERY 4 HOURS
Refills: 0 | Status: DISCONTINUED | OUTPATIENT
Start: 2021-01-20 | End: 2021-01-23

## 2021-01-20 RX ADMIN — Medication 5 UNIT(S): at 16:46

## 2021-01-20 RX ADMIN — OXYCODONE HYDROCHLORIDE 5 MILLIGRAM(S): 5 TABLET ORAL at 01:36

## 2021-01-20 RX ADMIN — Medication 6: at 16:45

## 2021-01-20 RX ADMIN — PIPERACILLIN AND TAZOBACTAM 25 GRAM(S): 4; .5 INJECTION, POWDER, LYOPHILIZED, FOR SOLUTION INTRAVENOUS at 14:18

## 2021-01-20 RX ADMIN — Medication 166.67 MILLIGRAM(S): at 05:50

## 2021-01-20 RX ADMIN — Medication 650 MILLIGRAM(S): at 13:28

## 2021-01-20 RX ADMIN — MORPHINE SULFATE 2 MILLIGRAM(S): 50 CAPSULE, EXTENDED RELEASE ORAL at 14:18

## 2021-01-20 RX ADMIN — PIPERACILLIN AND TAZOBACTAM 25 GRAM(S): 4; .5 INJECTION, POWDER, LYOPHILIZED, FOR SOLUTION INTRAVENOUS at 00:34

## 2021-01-20 RX ADMIN — ENOXAPARIN SODIUM 40 MILLIGRAM(S): 100 INJECTION SUBCUTANEOUS at 22:17

## 2021-01-20 RX ADMIN — INSULIN GLARGINE 15 UNIT(S): 100 INJECTION, SOLUTION SUBCUTANEOUS at 22:16

## 2021-01-20 RX ADMIN — Medication 4: at 22:15

## 2021-01-20 RX ADMIN — Medication 650 MILLIGRAM(S): at 05:54

## 2021-01-20 RX ADMIN — PIPERACILLIN AND TAZOBACTAM 25 GRAM(S): 4; .5 INJECTION, POWDER, LYOPHILIZED, FOR SOLUTION INTRAVENOUS at 22:15

## 2021-01-20 RX ADMIN — Medication 12: at 08:06

## 2021-01-20 RX ADMIN — Medication 166.67 MILLIGRAM(S): at 18:41

## 2021-01-20 RX ADMIN — PIPERACILLIN AND TAZOBACTAM 25 GRAM(S): 4; .5 INJECTION, POWDER, LYOPHILIZED, FOR SOLUTION INTRAVENOUS at 08:20

## 2021-01-20 NOTE — CONSULT NOTE ADULT - SUBJECTIVE AND OBJECTIVE BOX
Patient is a 65y old  Male who presents with a chief complaint of right foot wound (2021 10:54)      HPI:  This patient is a 65 year old man hx of DM who presents to the ER c/o right foot wound.  Patient states that the wound has been present for 1 week.  He denies fever or trauma to the foot.  He was seen by his podiatrist yesterday and states that Xrays were done but he has no results. (2021 15:28)    Above reviewed. Patient reports falling recently outdoors. Able to get home without help but his foot has been hurting and now has a lot of trouble walking, denies fever or chills and otherwise feels well.   ID consulted for workup and antibiotic management     PAST MEDICAL & SURGICAL HISTORY:  Diabetes    Amputation of left foot  left pink        Allergies  bananas (Rash)  Cauliflower (Other)  No Known Drug Allergies        ANTIMICROBIALS:  piperacillin/tazobactam IVPB.. 3.375 every 8 hours  vancomycin  IVPB 1250 every 12 hours      MEDICATIONS  (STANDING):  piperacillin/tazobactam IVPB.   200 mL/Hr IV Intermittent (21 @ 14:59)    piperacillin/tazobactam IVPB..   25 mL/Hr IV Intermittent (21 @ 14:18)   25 mL/Hr IV Intermittent (21 @ 08:20)   25 mL/Hr IV Intermittent (21 @ 00:34)    vancomycin  IVPB   166.67 mL/Hr IV Intermittent (21 @ 05:50)   166.67 mL/Hr IV Intermittent (21 @ 18:49)    vancomycin  IVPB   250 mL/Hr IV Intermittent (21 @ 16:20)        OTHER MEDS: MEDICATIONS  (STANDING):  acetaminophen   Tablet .. 650 every 6 hours PRN  dextrose 40% Gel 15 once  dextrose 50% Injectable 25 once  dextrose 50% Injectable 12.5 once  dextrose 50% Injectable 25 once  enoxaparin Injectable 40 at bedtime  glucagon  Injectable 1 once  insulin glargine Injectable (LANTUS) 15 two times a day  insulin lispro (ADMELOG) corrective regimen sliding scale  three times a day before meals  insulin lispro (ADMELOG) corrective regimen sliding scale  at bedtime  insulin lispro Injectable (ADMELOG) 5 three times a day before meals  morphine  - Injectable 2 every 4 hours PRN  traMADol 50 every 8 hours PRN      SOCIAL HISTORY:     Smoked up until a few ago x30 years   denies etoh   cocaine in past though was vague and may have been recently     FAMILY HISTORY:  Father  of cancer at young age        REVIEW OF SYSTEMS  [  ] ROS unobtainable because:    [X  ] All other systems negative except as noted below:	    Constitutional:  [ ] fever [ ] chills  [ ] weight loss  [ ] weakness  Skin:  [ ] rash [ ] phlebitis	  Eyes: [ ] icterus [ ] pain  [ ] discharge	  ENMT: [ ] sore throat  [ ] thrush [ ] ulcers [ ] exudates  Respiratory: [ ] dyspnea [ ] hemoptysis [ ] cough [ ] sputum	  Cardiovascular:  [ ] chest pain [ ] palpitations [ ] edema	  Gastrointestinal:  [ ] nausea [ ] vomiting [ ] diarrhea [ ] constipation [ ] pain	  Genitourinary:  [ ] dysuria [ ] frequency [ ] hematuria [ ] discharge [ ] flank pain  [ ] incontinence  Musculoskeletal:  [ ] myalgias [ ] arthralgias [ ] arthritis  [ X] foot pain  Neurological:  [ ] headache [ ] seizures  [ ] confusion/altered mental status  Psychiatric:  [ ] anxiety [ ] depression	  Hematology/Lymphatics:  [ ] lymphadenopathy  Endocrine:  [ ] adrenal [ ] thyroid  Allergic/Immunologic:	 [ ] transplant [ ] seasonal    Vital Signs Last 24 Hrs  T(F): 99.3 (21 @ 17:31), Max: 99.8 (21 @ 18:20)  Vital Signs Last 24 Hrs  HR: 65 (21 @ 17:31) (52 - 65)  BP: 115/67 (21 @ 17:31) (109/65 - 125/73)  RR: 18 (21 @ 17:31)  SpO2: 98% (21 @ 17:31) (97% - 99%)  Wt(kg): --    PHYSICAL EXAM:  Constitutional: non-toxic, no distress  HEAD/EYES: anicteric, no conjunctival injection  ENT:  supple, no thrush  Cardiovascular:   normal S1, S2, no murmur, no edema  Respiratory:  clear BS bilaterally, no wheezes, no rales  GI:  soft, non-tender, normal bowel sounds  :  no ventura, no CVA tenderness  Musculoskeletal:  no synovitis, normal ROM  Neurologic: awake and alert, normal strength, no focal findings  Skin:  5th right toe wound with some necrosis as well as large tracking blister on the medial aspect extending close to the heel and very tender   Heme/Onc: no lymphadenopathy   Psychiatric:  awake, alert, appropriate mood          WBC Count: 9.45 K/uL ( @ 08:14)  WBC Count: 12.54 K/uL ( @ 15:04)                            8.2    9.45  )-----------( 273      ( 2021 08:14 )             25.8           137  |  106  |  20  ----------------------------<  404<H>  4.7   |  24  |  1.07    Ca    8.9      2021 08:14    TPro  8.2  /  Alb  2.5<L>  /  TBili  0.2  /  DBili  x   /  AST  14<L>  /  ALT  20  /  AlkPhos  106        Creatinine Trend: 1.07<--, 1.05<--        MICROBIOLOGY:    C-Reactive Protein, Serum: 8.55 ()    RADIOLOGY:  < from: MR Ankle No Cont, Right (21 @ 09:54) >    IMPRESSION:  1. Presumed stress related edema involving the fourth metatarsal diaphysis.  2. No other marrow edema is seen to suggest osteomyelitis.  3. Large cutaneous blister is present along the plantar medial midfoot and heel fat-pad with underlying subcutaneous wound/induration.  4. There is adjacent near complete tearing of the central band of the plantar fascia.    < end of copied text >

## 2021-01-20 NOTE — DISCHARGE NOTE PROVIDER - CARE PROVIDER_API CALL
Bandar Gill  PODIATRIC MEDICINE AND SURGERY  [address] [place] [address]  Phone: [phone]  Fax: [phone]  Follow Up Time: 1 week    Blaise Jacob (DO)  Internal Medicine  [address] [place] [id number]  Phone: [phone]  Fax: [phone])-  Follow Up Time: 1 week

## 2021-01-20 NOTE — DISCHARGE NOTE PROVIDER - NSDCMRMEDTOKEN_GEN_ALL_CORE_FT
Levemir FlexTouch 100 units/mL subcutaneous solution: 32 unit(s) subcutaneous once a day (at bedtime)    acetaminophen 325 mg oral tablet: 3 tab(s) orally every 8 hours, As Needed - 3)  collagenase 250 units/g topical ointment: 1 application topically once a day  Levemir FlexTouch 100 units/mL subcutaneous solution: 32 unit(s) subcutaneous once a day (at bedtime)   pantoprazole 40 mg oral delayed release tablet: 1 tab(s) orally once a day (before a meal)  senna oral tablet: 2 tab(s) orally once a day (at bedtime)  traMADol 50 mg oral tablet: 1 tab(s) orally every 8 hours, As needed, Moderate Pain (4 - 6) MDD:3 tabs   acetaminophen 325 mg oral tablet: 3 tab(s) orally every 8 hours, As Needed - 3)  alcohol swabs : Apply topically to affected area 4 times a day   amoxicillin-clavulanate 875 mg-125 mg oral tablet: 1 tab(s) orally every 12 hours  collagenase 250 units/g topical ointment: 1 application topically once a day  Insulin Pen Needles, 4mm: 1 application subcutaneously 4 times a day. ** Use with insulin pen **   Januvia 100 mg oral tablet: 1 tab(s) orally once a day   lancets: 1 application subcutaneously 4 times a day   Levemir FlexTouch 100 units/mL subcutaneous solution: 15 unit(s) subcutaneous once a day (at bedtime)   pantoprazole 40 mg oral delayed release tablet: 1 tab(s) orally once a day (before a meal)  senna oral tablet: 2 tab(s) orally once a day (at bedtime)  test strips (per patient&#x27;s insurance): 1 application subcutaneously 4 times a day. ** Compatible with patient&#x27;s glucometer **  traMADol 50 mg oral tablet: 1 tab(s) orally every 8 hours, As needed, Moderate Pain (4 - 6) MDD:3 tabs  Vitamin D2 50,000 intl units (1.25 mg) oral capsule: 1 cap(s) orally every 7 days

## 2021-01-20 NOTE — DISCHARGE NOTE PROVIDER - PROVIDER TOKENS
PROVIDER:[TOKEN:[[id number]],FOLLOWUP:[1 week]],PROVIDER:[TOKEN:[[id number]],FOLLOWUP:[1 week]]

## 2021-01-20 NOTE — PROGRESS NOTE ADULT - ASSESSMENT
64 y/o M right foot 5th met wound to bone, and 5th toe wound to SubQ    - pt seen and evaluated  - afebrile, wbc 12, , CRP 8  - right foot submet 5 wound to capsule, no purulence, no drainage, no erythema, no malodor, R 5th toe lateral wound to SubQ  - cont IV abx, rec ID c/s for abx management   - R foot XR with no subq gas or evidence of OM  - awaiting R foot MR- to be performed today, final plan pending results   - will tentatively book for R foot partial 5th ray resection Friday 1/22  - please document medical clearance for surgery and anesthesia under local w/ sedation  - seen w/ attending

## 2021-01-20 NOTE — CONSULT NOTE ADULT - ASSESSMENT
65M with poorly controlled DM presenting diabetic foot ulcer  afebrile, leukocytosis which has improved, high ESR  MRI negative for osteomyelitis though does have large blister   podiatry recommending wound care and antibiotics with no surgery   wound culture was sent->last admission grew providencia and CONS, no MRSA   podiatry states probe to bone which in many instances is osteomyelitis even with negative MRI   will follow the cultures and continue antibiotics for now   needs good glycemic control for healing     Suggest-  Continue vancomycin 1250mg q12hrs and obtain trough now before the 4th dose   Continue (Zosyn) Piperacillin/tazobactam 3.375 grams every 8 hours   f/u wound cultures  Physical therapy   podiatry ongoing followup   will determine course and duration on antibiotics based on course   utox given the cocaine history   please send HbA1c and good but not too tight glycemic control  Discussed with Dr. Maximino Jacob,   Cell: [phone]  Pager [phone]  Infectious Disease Attending  After 5pm/weekends please call [phone] for all inquiries and new consults

## 2021-01-20 NOTE — PROGRESS NOTE ADULT - SUBJECTIVE AND OBJECTIVE BOX
Patient is a 65y old  Male who presents with a chief complaint of right foot wound (19 Jan 2021 15:28)       INTERVAL HPI/OVERNIGHT EVENTS:  Patient seen and evaluated at bedside.  Pt is resting comfortable in NAD. Denies N/V/F/C.    Allergies    bananas (Rash)  Cauliflower (Other)  No Known Drug Allergies    Intolerances        Vital Signs Last 24 Hrs  T(C): 37.2 (20 Jan 2021 05:50), Max: 37.7 (19 Jan 2021 18:20)  T(F): 99 (20 Jan 2021 05:50), Max: 99.8 (19 Jan 2021 18:20)  HR: 52 (20 Jan 2021 05:50) (52 - 86)  BP: 125/73 (20 Jan 2021 05:50) (110/60 - 125/73)  BP(mean): --  RR: 18 (20 Jan 2021 05:50) (15 - 19)  SpO2: 98% (20 Jan 2021 05:50) (96% - 99%)    LABS:                        9.3    12.54 )-----------( 286      ( 19 Jan 2021 15:04 )             28.9     01-19    138  |  106  |  21  ----------------------------<  195<H>  4.6   |  27  |  1.05    Ca    9.4      19 Jan 2021 15:04    TPro  8.2  /  Alb  2.5<L>  /  TBili  0.2  /  DBili  x   /  AST  14<L>  /  ALT  20  /  AlkPhos  106  01-19        CAPILLARY BLOOD GLUCOSE      POCT Blood Glucose.: 444 mg/dL (20 Jan 2021 07:59)  POCT Blood Glucose.: 429 mg/dL (20 Jan 2021 07:33)  POCT Blood Glucose.: 203 mg/dL (19 Jan 2021 11:24)      Lower Extremity Physical Exam:  Vascular: DP/PT 2/4 B/L, CFT <3 seconds B/L, Temperature gradient warm to cool B/L  Neuro: Epicritic sensation intact to the level of ankle B/L  Musculoskeletal/Ortho: left foot partial 5th ray resection healed  Derm:  Wound #1: right foot submet 5 wound to capsule, tracks 1.5 cm medial, no purulence, no drainage, no erythema, no malodor, etiology: diabetic neuropathy, and 5th toe lateral wound to SubQ    RADIOLOGY & ADDITIONAL TESTS:    < from: Xray Foot AP + Lateral, Right (01.19.21 @ 10:33) >    EXAM:  FOOT 2VIEWS RT                            PROCEDURE DATE:  01/19/2021          INTERPRETATION:  Right foot wound.    3 views right foot.    No fracture dislocation focal bone lysis or unusual periosteal reaction. Joint spaces preserved. Moderate plantar calcaneal osteophyte. No soft tissue gas.    Impression: No acute or destructive osseous pathology.            VASHTI SNYDER MD; Attending Radiologist  This document has been electronically signed. Jan 19 2021 12:31PM    < end of copied text >

## 2021-01-20 NOTE — DISCHARGE NOTE PROVIDER - HOSPITAL COURSE
65M with poorly controlled DM, s/p left 5th metatarsal amputation 2019,  presenting diabetic foot ulcer    Assessment and Plan:   #Rt foot Diabetic foot Ulcer with tracking subQ edema to the heel   afebrile , WBC 12, ESR and CRP elevated   Rt foot XRay with no subq gas   MRI rt ankle--no e/o OM   Vanc trough 17.6   wound culture --prelim few staph aureus   Today 1/21/21 podiatry performed debridement of plantar midfoot blister with 15cc purulence expressed , revealing healthy dermis with plantar medial midfoot wound to subQ measuring 3x3x0.3cm with fibrotic wound base and no tracking. repeat wound Cx sent.   discussed with podiatry Dr. Gill, states that on his assessment he was not able to probe to bone. he reviewed imaging and because MRI was of ankle, distal foot is not very well appreciated. recommended to follow-up on repeat wound culture from debridement today and also recommended MRI foot w/w.o IV contrast.   discussed podiatry plan with ID , and also expressed concern in view of bump in Cr; ID agrees with change of abx from zosyn to cefepime and flagyl, continue with vancomycin, follow pre-4th dose vanc trough   Nephrology consult to assess ability to perform MRI with contrast in view of increase in Cr   awaiting repeat MRI of Rt foot for final abx recs   IVF   pain control prn     #IDA  most likely pre-renal azotemia   urinating without issues   changed abx from zosyn   IVF   repeat Cr   nephrology consult       #Dm2, uncontrolled due to med noncompliance   c/b neuropathy   A1c 10.9%  lantus 15u bid, premeal lispro, ISS   CHO diet   FS goal while hospitalized 140-180     #AOCD   no e/o bleeding or bruising   anemia labs consistent with AOCD , Ferr elevated, TIBC low   monitor H/H   transfuse if h/h <7/21 or if patient symptomatic     #Vit D deficiency --vit D 50,000iu weekly x 8 weeks supplement     Discharge time : 40 min       RETURN PARAMETERS DISCUSSED WITH PATIENT, PATIENT EXPRESSED UNDERSTANDING AND IS AGREEABLE. DISCUSSED WITH PATIENT ON REFRAINING FROM DRIVING UNTIL FOLLOW-UP/ CLEARED BY PMD. PATIENT EXPRESSED UNDERSTANDING.   65M with poorly controlled DM, s/p left 5th metatarsal amputation 2019,  presenting diabetic foot ulcer      DISCHARGE DIAGNOSES:    #Rt foot Diabetic foot Ulcer with tracking subQ edema to the heel   afebrile , WBC 12, ESR and CRP elevated   Rt foot XRay with no subq gas   MRI rt ankle--no e/o OM   1/21/21 podiatry performed debridement of plantar midfoot blister with 15cc purulence expressed , revealing healthy dermis with plantar medial midfoot wound to subQ measuring 3x3x0.3cm with fibrotic wound base and no tracking. repeat wound Cx sent.   discussed with podiatry Dr. Gill, states that on his assessment he was not able to probe to bone. he reviewed imaging and because MRI was of ankle, distal foot is not very well appreciated.  wound culture and asbcess Cx  --MSSA  repeat MRI no e/o OM   discussed with  podiatry and  ID   per podiatry, wound DOES NOT PROBE TO BONE ** (it was an erroneous documentation in the initial podiatry note )   will change to augmentin 875mg Q12H x 7 days per ID recs   pain control prn     #IDA  most likely pre-renal azotemia   urinating without issues   s/p IVF Cr improved       #DM2, uncontrolled due to med noncompliance   c/b neuropathy   A1c 10.9%  Rx levemir flextouch 15u once daily, januvia 100mg daily   diabetic supplies sent to pharmacy   discussed new regimen with patient         #AOCD   no e/o bleeding or bruising   anemia labs consistent with AOCD , Ferr elevated, TIBC low   H/H stable     #Vit D deficiency --vit D 50,000iu weekly x 8 weeks supplement     home with home PT     Discharge time : 40 min         RETURN PARAMETERS DISCUSSED WITH PATIENT, PATIENT EXPRESSED UNDERSTANDING AND IS AGREEABLE. DISCUSSED WITH PATIENT ON REFRAINING FROM DRIVING UNTIL FOLLOW-UP/ CLEARED BY PMD. PATIENT EXPRESSED UNDERSTANDING.

## 2021-01-20 NOTE — DISCHARGE NOTE PROVIDER - NSDCCPCAREPLAN_GEN_ALL_CORE_FT
PRINCIPAL DISCHARGE DIAGNOSIS  Diagnosis: Diabetic foot ulcer  Assessment and Plan of Treatment:       SECONDARY DISCHARGE DIAGNOSES  Diagnosis: IDA (acute kidney injury)  Assessment and Plan of Treatment:     Diagnosis: Neuropathy  Assessment and Plan of Treatment:     Diagnosis: Diabetes  Assessment and Plan of Treatment: Diabetes

## 2021-01-20 NOTE — PROGRESS NOTE ADULT - SUBJECTIVE AND OBJECTIVE BOX
HPI:  This patient is a 65 year old man hx of DM who presents to the ER c/o right foot wound.  Patient states that the wound has been present for 1 week.  He denies fever or trauma to the foot.  He was seen by his podiatrist yesterday and states that Xrays were done but he has no results.     (19 Jan 2021 15:28)      SUBJECTIVE & OBJECTIVE: Pt seen and examined at bedside.   no overnight events.   complaining of right foot pain and swelling     Denies fever, chills, N/V, dizziness, HA, cough, CP, palpitations, SOB, abdominal pain, dysuria, diarrhea, constipation.     PHYSICAL EXAM:  Vital Signs Last 24 Hrs  T(F): 99.3 (01-20-21 @ 17:31), Max: 99.8 (01-19-21 @ 18:20)  Vital Signs Last 24 Hrs  HR: 65 (01-20-21 @ 17:31) (52 - 65)  BP: 115/67 (01-20-21 @ 17:31) (109/65 - 125/73)  RR: 18 (01-20-21 @ 17:31)  SpO2: 98% (01-20-21 @ 17:31) (97% - 99%)  Wt(kg): --      GENERAL: NAD, no increased WOB, nontoxic appearing   HEAD:  Atraumatic, Normocephalic  EYES: EOMI, PERRLA, conjunctiva and sclera clear  ENMT: Moist mucous membranes  NECK: Supple, No JVD  NERVOUS SYSTEM:  Alert & Oriented X3,  no focal neuro deficits  CHEST/LUNG: CTAB  HEART: Regular rate and rhythm; No murmurs, rubs, or gallops  ABDOMEN: Soft, Nontender, Nondistended; Bowel sounds present  EXTREMITIES: open wound at the 5th right metatarsal  with  large tracking blister on the medial aspect extending  to the heel and very tender to palpation ; s/p left 5th metatarsal amputation --well healed     MEDICATIONS  (STANDING):  dextrose 40% Gel 15 Gram(s) Oral once  dextrose 5%. 1000 milliLiter(s) (50 mL/Hr) IV Continuous <Continuous>  dextrose 5%. 1000 milliLiter(s) (100 mL/Hr) IV Continuous <Continuous>  dextrose 50% Injectable 25 Gram(s) IV Push once  dextrose 50% Injectable 12.5 Gram(s) IV Push once  dextrose 50% Injectable 25 Gram(s) IV Push once  enoxaparin Injectable 40 milliGRAM(s) SubCutaneous at bedtime  glucagon  Injectable 1 milliGRAM(s) IntraMuscular once  insulin glargine Injectable (LANTUS) 15 Unit(s) SubCutaneous two times a day  insulin lispro (ADMELOG) corrective regimen sliding scale   SubCutaneous three times a day before meals  insulin lispro (ADMELOG) corrective regimen sliding scale   SubCutaneous at bedtime  insulin lispro Injectable (ADMELOG) 5 Unit(s) SubCutaneous three times a day before meals  mupirocin 2% Ointment 1 Application(s) Topical once  piperacillin/tazobactam IVPB.. 3.375 Gram(s) IV Intermittent every 8 hours  vancomycin  IVPB 1250 milliGRAM(s) IV Intermittent every 12 hours    MEDICATIONS  (PRN):  acetaminophen   Tablet .. 650 milliGRAM(s) Oral every 6 hours PRN Mild Pain (1 - 3)  morphine  - Injectable 2 milliGRAM(s) IV Push every 4 hours PRN Severe Pain (7 - 10)  traMADol 50 milliGRAM(s) Oral every 8 hours PRN Moderate Pain (4 - 6)      LABS:                        8.2    9.45  )-----------( 273      ( 20 Jan 2021 08:14 )             25.8     01-20    137  |  106  |  20  ----------------------------<  404<H>  4.7   |  24  |  1.07    Ca    8.9      20 Jan 2021 08:14    TPro  8.2  /  Alb  2.5<L>  /  TBili  0.2  /  DBili  x   /  AST  14<L>  /  ALT  20  /  AlkPhos  106  01-19        RECENT CULTURES:  Culture - Abscess with Gram Stain (01.20.21 @ 00:32)    Specimen Source: .Abscess foot    Culture Results:   Few Staphylococcus aureus        RADIOLOGY:  < from: MR Ankle No Cont, Right (01.20.21 @ 09:54) >    IMPRESSION:  1. Presumed stress related edema involving the fourth metatarsal diaphysis.  2. No other marrow edema is seen to suggest osteomyelitis.  3. Large cutaneous blister is present along the plantar medial midfoot and heel fat-pad with underlying subcutaneous wound/induration.  4. There is adjacent near complete tearing of the central band of the plantar fascia.    < end of copied text >        RADIOLOGY & ADDITIONAL TESTS:  Imaging Personally Reviewed:  [ ] YES  [ ] NO    Consultant(s) Notes Reviewed:  [ ] YES  [ ] NO    Care Discussed with Consultants/Other Providers [ ] YES  [ ] NO

## 2021-01-20 NOTE — DISCHARGE NOTE PROVIDER - NSDCFUADDINST_GEN_ALL_CORE_FT
Podiatry Discharge Instructions:  Follow up: Please follow up with Dr. Gill within 1 week of discharge from the hospital, please call [phone] for appointment and discuss that you recently were seen in the hospital.  Wound Care: Please apply mupirocin to R foot wound and redress with 4x4 gauze and rula daily  Weight bearing: Please weight bear as tolerated in a surgical shoe to R foot with weight to heel  Antibiotics: Please continue as instructed. Podiatry Discharge Instructions:  Follow up: Please follow up with Dr. Gill within 1 week of discharge from the hospital, please call [phone] for appointment and discuss that you recently were seen in the hospital.  Wound Care: Please make daily dressing change to right foot wounds with application of Santyl collagenase, 4x4 gauze, ABD pad and Rosina.   Weight bearing: Please weight bear as tolerated in a surgical shoe to R foot with weight to heel  Antibiotics: Please continue as instructed.

## 2021-01-20 NOTE — PROGRESS NOTE ADULT - ASSESSMENT
65M with poorly controlled DM, s/p left 5th metatarsal amputation 2019,  presenting diabetic foot ulcer    Assessment and Plan:   #Rt foot Diabetic foot Ulcer with tracking subQ edema to the heel   afebrile , WBC 12, ESR and CRP elevated   Rt foot XRay with no subq gas   MRI rt foot --no e/o OM   Podiatry eval appreciated, rec wound care and abx as MRI neg for OM  ID consult appreciated, continue with vanc , zosyn   follow Vanc trough  follow wound Cx     #Dm2, uncontrolled  c/b neuropathy   lantus 15u bid, premeal lispro, ISS   CHO diet   FS goal while hospitalized 140-180   follow A1c     #normocytic anemia   check anemia labs   no e/o bleeding or bruising   monitor H/H   transfuse if h/h <7/21 or if patient symptomatic     #Preventative measures   lovenox SQ-dvt ppx  fall precautions

## 2021-01-21 LAB
-  AMPICILLIN/SULBACTAM: SIGNIFICANT CHANGE UP
-  CEFAZOLIN: SIGNIFICANT CHANGE UP
-  CLINDAMYCIN: SIGNIFICANT CHANGE UP
-  ERYTHROMYCIN: SIGNIFICANT CHANGE UP
-  GENTAMICIN: SIGNIFICANT CHANGE UP
-  OXACILLIN: SIGNIFICANT CHANGE UP
-  PENICILLIN: SIGNIFICANT CHANGE UP
-  RIFAMPIN: SIGNIFICANT CHANGE UP
-  TETRACYCLINE: SIGNIFICANT CHANGE UP
-  TRIMETHOPRIM/SULFAMETHOXAZOLE: SIGNIFICANT CHANGE UP
-  VANCOMYCIN: SIGNIFICANT CHANGE UP
24R-OH-CALCIDIOL SERPL-MCNC: 15.3 NG/ML — LOW (ref 30–80)
A1C WITH ESTIMATED AVERAGE GLUCOSE RESULT: 10.9 % — HIGH (ref 4–5.6)
ALBUMIN SERPL ELPH-MCNC: 2.2 G/DL — LOW (ref 3.3–5)
ALP SERPL-CCNC: 117 U/L — SIGNIFICANT CHANGE UP (ref 40–120)
ALT FLD-CCNC: 27 U/L — SIGNIFICANT CHANGE UP (ref 12–78)
ANION GAP SERPL CALC-SCNC: 4 MMOL/L — LOW (ref 5–17)
ANION GAP SERPL CALC-SCNC: 4 MMOL/L — LOW (ref 5–17)
APPEARANCE UR: CLEAR — SIGNIFICANT CHANGE UP
AST SERPL-CCNC: 18 U/L — SIGNIFICANT CHANGE UP (ref 15–37)
BACTERIA # UR AUTO: ABNORMAL
BASOPHILS # BLD AUTO: 0.03 K/UL — SIGNIFICANT CHANGE UP (ref 0–0.2)
BASOPHILS NFR BLD AUTO: 0.3 % — SIGNIFICANT CHANGE UP (ref 0–2)
BILIRUB SERPL-MCNC: 0.3 MG/DL — SIGNIFICANT CHANGE UP (ref 0.2–1.2)
BILIRUB UR-MCNC: NEGATIVE — SIGNIFICANT CHANGE UP
BUN SERPL-MCNC: 20 MG/DL — SIGNIFICANT CHANGE UP (ref 7–23)
BUN SERPL-MCNC: 23 MG/DL — SIGNIFICANT CHANGE UP (ref 7–23)
CALCIUM SERPL-MCNC: 9.1 MG/DL — SIGNIFICANT CHANGE UP (ref 8.5–10.1)
CALCIUM SERPL-MCNC: 9.3 MG/DL — SIGNIFICANT CHANGE UP (ref 8.5–10.1)
CHLORIDE SERPL-SCNC: 106 MMOL/L — SIGNIFICANT CHANGE UP (ref 96–108)
CHLORIDE SERPL-SCNC: 108 MMOL/L — SIGNIFICANT CHANGE UP (ref 96–108)
CO2 SERPL-SCNC: 26 MMOL/L — SIGNIFICANT CHANGE UP (ref 22–31)
CO2 SERPL-SCNC: 28 MMOL/L — SIGNIFICANT CHANGE UP (ref 22–31)
COLOR SPEC: YELLOW — SIGNIFICANT CHANGE UP
CREAT SERPL-MCNC: 1.28 MG/DL — SIGNIFICANT CHANGE UP (ref 0.5–1.3)
CREAT SERPL-MCNC: 1.42 MG/DL — HIGH (ref 0.5–1.3)
DIFF PNL FLD: ABNORMAL
EOSINOPHIL # BLD AUTO: 0.17 K/UL — SIGNIFICANT CHANGE UP (ref 0–0.5)
EOSINOPHIL NFR BLD AUTO: 1.7 % — SIGNIFICANT CHANGE UP (ref 0–6)
ESTIMATED AVERAGE GLUCOSE: 266 MG/DL — HIGH (ref 68–114)
FERRITIN SERPL-MCNC: 625 NG/ML — HIGH (ref 30–400)
FOLATE SERPL-MCNC: 14.8 NG/ML — SIGNIFICANT CHANGE UP
GLUCOSE BLDC GLUCOMTR-MCNC: 102 MG/DL — HIGH (ref 70–99)
GLUCOSE BLDC GLUCOMTR-MCNC: 209 MG/DL — HIGH (ref 70–99)
GLUCOSE BLDC GLUCOMTR-MCNC: 224 MG/DL — HIGH (ref 70–99)
GLUCOSE BLDC GLUCOMTR-MCNC: 309 MG/DL — HIGH (ref 70–99)
GLUCOSE BLDC GLUCOMTR-MCNC: 71 MG/DL — SIGNIFICANT CHANGE UP (ref 70–99)
GLUCOSE SERPL-MCNC: 284 MG/DL — HIGH (ref 70–99)
GLUCOSE SERPL-MCNC: 60 MG/DL — LOW (ref 70–99)
GLUCOSE UR QL: NEGATIVE MG/DL — SIGNIFICANT CHANGE UP
HCT VFR BLD CALC: 27.6 % — LOW (ref 39–50)
HGB BLD-MCNC: 8.8 G/DL — LOW (ref 13–17)
IMM GRANULOCYTES NFR BLD AUTO: 0.4 % — SIGNIFICANT CHANGE UP (ref 0–1.5)
IRON SATN MFR SERPL: 10 % — LOW (ref 16–55)
IRON SATN MFR SERPL: 23 UG/DL — LOW (ref 45–165)
KETONES UR-MCNC: NEGATIVE — SIGNIFICANT CHANGE UP
LEUKOCYTE ESTERASE UR-ACNC: NEGATIVE — SIGNIFICANT CHANGE UP
LYMPHOCYTES # BLD AUTO: 1.84 K/UL — SIGNIFICANT CHANGE UP (ref 1–3.3)
LYMPHOCYTES # BLD AUTO: 18.3 % — SIGNIFICANT CHANGE UP (ref 13–44)
MAGNESIUM SERPL-MCNC: 2.2 MG/DL — SIGNIFICANT CHANGE UP (ref 1.6–2.6)
MCHC RBC-ENTMCNC: 29.7 PG — SIGNIFICANT CHANGE UP (ref 27–34)
MCHC RBC-ENTMCNC: 31.9 GM/DL — LOW (ref 32–36)
MCV RBC AUTO: 93.2 FL — SIGNIFICANT CHANGE UP (ref 80–100)
METHOD TYPE: SIGNIFICANT CHANGE UP
MONOCYTES # BLD AUTO: 0.94 K/UL — HIGH (ref 0–0.9)
MONOCYTES NFR BLD AUTO: 9.4 % — SIGNIFICANT CHANGE UP (ref 2–14)
NEUTROPHILS # BLD AUTO: 7.01 K/UL — SIGNIFICANT CHANGE UP (ref 1.8–7.4)
NEUTROPHILS NFR BLD AUTO: 69.9 % — SIGNIFICANT CHANGE UP (ref 43–77)
NITRITE UR-MCNC: NEGATIVE — SIGNIFICANT CHANGE UP
NRBC # BLD: 0 /100 WBCS — SIGNIFICANT CHANGE UP (ref 0–0)
PH UR: 5 — SIGNIFICANT CHANGE UP (ref 5–8)
PHOSPHATE SERPL-MCNC: 3.9 MG/DL — SIGNIFICANT CHANGE UP (ref 2.5–4.5)
PLATELET # BLD AUTO: 306 K/UL — SIGNIFICANT CHANGE UP (ref 150–400)
POTASSIUM SERPL-MCNC: 3.7 MMOL/L — SIGNIFICANT CHANGE UP (ref 3.5–5.3)
POTASSIUM SERPL-MCNC: 4.8 MMOL/L — SIGNIFICANT CHANGE UP (ref 3.5–5.3)
POTASSIUM SERPL-SCNC: 3.7 MMOL/L — SIGNIFICANT CHANGE UP (ref 3.5–5.3)
POTASSIUM SERPL-SCNC: 4.8 MMOL/L — SIGNIFICANT CHANGE UP (ref 3.5–5.3)
PROT SERPL-MCNC: 7.8 GM/DL — SIGNIFICANT CHANGE UP (ref 6–8.3)
PROT UR-MCNC: 30 MG/DL
RBC # BLD: 2.96 M/UL — LOW (ref 4.2–5.8)
RBC # FLD: 14.4 % — SIGNIFICANT CHANGE UP (ref 10.3–14.5)
RBC CASTS # UR COMP ASSIST: SIGNIFICANT CHANGE UP /HPF (ref 0–4)
SARS-COV-2 IGG SERPL QL IA: NEGATIVE — SIGNIFICANT CHANGE UP
SARS-COV-2 IGM SERPL IA-ACNC: <0.1 INDEX — SIGNIFICANT CHANGE UP
SODIUM SERPL-SCNC: 136 MMOL/L — SIGNIFICANT CHANGE UP (ref 135–145)
SODIUM SERPL-SCNC: 140 MMOL/L — SIGNIFICANT CHANGE UP (ref 135–145)
SP GR SPEC: 1.02 — SIGNIFICANT CHANGE UP (ref 1.01–1.02)
TIBC SERPL-MCNC: 232 UG/DL — SIGNIFICANT CHANGE UP (ref 220–430)
TSH SERPL-MCNC: 1.09 UU/ML — SIGNIFICANT CHANGE UP (ref 0.36–3.74)
UIBC SERPL-MCNC: 209 UG/DL — SIGNIFICANT CHANGE UP (ref 110–370)
UROBILINOGEN FLD QL: NEGATIVE MG/DL — SIGNIFICANT CHANGE UP
VIT B12 SERPL-MCNC: 1045 PG/ML — SIGNIFICANT CHANGE UP (ref 232–1245)
WBC # BLD: 10.03 K/UL — SIGNIFICANT CHANGE UP (ref 3.8–10.5)
WBC # FLD AUTO: 10.03 K/UL — SIGNIFICANT CHANGE UP (ref 3.8–10.5)
WBC UR QL: SIGNIFICANT CHANGE UP

## 2021-01-21 PROCEDURE — 73718 MRI LOWER EXTREMITY W/O DYE: CPT | Mod: 26,RT

## 2021-01-21 PROCEDURE — 99233 SBSQ HOSP IP/OBS HIGH 50: CPT

## 2021-01-21 RX ORDER — CEFEPIME 1 G/1
2000 INJECTION, POWDER, FOR SOLUTION INTRAMUSCULAR; INTRAVENOUS EVERY 12 HOURS
Refills: 0 | Status: DISCONTINUED | OUTPATIENT
Start: 2021-01-22 | End: 2021-01-22

## 2021-01-21 RX ORDER — PANTOPRAZOLE SODIUM 20 MG/1
40 TABLET, DELAYED RELEASE ORAL
Refills: 0 | Status: DISCONTINUED | OUTPATIENT
Start: 2021-01-21 | End: 2021-01-23

## 2021-01-21 RX ORDER — METRONIDAZOLE 500 MG
500 TABLET ORAL ONCE
Refills: 0 | Status: COMPLETED | OUTPATIENT
Start: 2021-01-21 | End: 2021-01-21

## 2021-01-21 RX ORDER — INSULIN GLARGINE 100 [IU]/ML
15 INJECTION, SOLUTION SUBCUTANEOUS EVERY MORNING
Refills: 0 | Status: DISCONTINUED | OUTPATIENT
Start: 2021-01-22 | End: 2021-01-23

## 2021-01-21 RX ORDER — SENNA PLUS 8.6 MG/1
2 TABLET ORAL AT BEDTIME
Refills: 0 | Status: DISCONTINUED | OUTPATIENT
Start: 2021-01-21 | End: 2021-01-23

## 2021-01-21 RX ORDER — COLLAGENASE CLOSTRIDIUM HIST. 250 UNIT/G
1 OINTMENT (GRAM) TOPICAL DAILY
Refills: 0 | Status: DISCONTINUED | OUTPATIENT
Start: 2021-01-21 | End: 2021-01-23

## 2021-01-21 RX ORDER — SODIUM CHLORIDE 9 MG/ML
1000 INJECTION, SOLUTION INTRAVENOUS
Refills: 0 | Status: DISCONTINUED | OUTPATIENT
Start: 2021-01-21 | End: 2021-01-23

## 2021-01-21 RX ORDER — METRONIDAZOLE 500 MG
500 TABLET ORAL EVERY 8 HOURS
Refills: 0 | Status: DISCONTINUED | OUTPATIENT
Start: 2021-01-21 | End: 2021-01-22

## 2021-01-21 RX ORDER — ERGOCALCIFEROL 1.25 MG/1
50000 CAPSULE ORAL
Refills: 0 | Status: DISCONTINUED | OUTPATIENT
Start: 2021-01-21 | End: 2021-01-23

## 2021-01-21 RX ORDER — POLYETHYLENE GLYCOL 3350 17 G/17G
17 POWDER, FOR SOLUTION ORAL DAILY
Refills: 0 | Status: DISCONTINUED | OUTPATIENT
Start: 2021-01-21 | End: 2021-01-23

## 2021-01-21 RX ORDER — METRONIDAZOLE 500 MG
TABLET ORAL
Refills: 0 | Status: DISCONTINUED | OUTPATIENT
Start: 2021-01-21 | End: 2021-01-22

## 2021-01-21 RX ORDER — CEFEPIME 1 G/1
2000 INJECTION, POWDER, FOR SOLUTION INTRAMUSCULAR; INTRAVENOUS ONCE
Refills: 0 | Status: COMPLETED | OUTPATIENT
Start: 2021-01-21 | End: 2021-01-21

## 2021-01-21 RX ORDER — CEFEPIME 1 G/1
INJECTION, POWDER, FOR SOLUTION INTRAMUSCULAR; INTRAVENOUS
Refills: 0 | Status: DISCONTINUED | OUTPATIENT
Start: 2021-01-21 | End: 2021-01-22

## 2021-01-21 RX ADMIN — Medication 166.67 MILLIGRAM(S): at 18:08

## 2021-01-21 RX ADMIN — Medication 5 UNIT(S): at 16:48

## 2021-01-21 RX ADMIN — Medication 100 MILLIGRAM(S): at 14:31

## 2021-01-21 RX ADMIN — Medication 4: at 16:47

## 2021-01-21 RX ADMIN — MORPHINE SULFATE 2 MILLIGRAM(S): 50 CAPSULE, EXTENDED RELEASE ORAL at 18:08

## 2021-01-21 RX ADMIN — SODIUM CHLORIDE 75 MILLILITER(S): 9 INJECTION, SOLUTION INTRAVENOUS at 12:43

## 2021-01-21 RX ADMIN — INSULIN GLARGINE 15 UNIT(S): 100 INJECTION, SOLUTION SUBCUTANEOUS at 08:06

## 2021-01-21 RX ADMIN — POLYETHYLENE GLYCOL 3350 17 GRAM(S): 17 POWDER, FOR SOLUTION ORAL at 11:45

## 2021-01-21 RX ADMIN — ERGOCALCIFEROL 50000 UNIT(S): 1.25 CAPSULE ORAL at 14:31

## 2021-01-21 RX ADMIN — MORPHINE SULFATE 2 MILLIGRAM(S): 50 CAPSULE, EXTENDED RELEASE ORAL at 06:34

## 2021-01-21 RX ADMIN — MORPHINE SULFATE 2 MILLIGRAM(S): 50 CAPSULE, EXTENDED RELEASE ORAL at 11:54

## 2021-01-21 RX ADMIN — PIPERACILLIN AND TAZOBACTAM 25 GRAM(S): 4; .5 INJECTION, POWDER, LYOPHILIZED, FOR SOLUTION INTRAVENOUS at 05:03

## 2021-01-21 RX ADMIN — CEFEPIME 100 MILLIGRAM(S): 1 INJECTION, POWDER, FOR SOLUTION INTRAMUSCULAR; INTRAVENOUS at 16:41

## 2021-01-21 RX ADMIN — Medication 5 UNIT(S): at 08:05

## 2021-01-21 RX ADMIN — ENOXAPARIN SODIUM 40 MILLIGRAM(S): 100 INJECTION SUBCUTANEOUS at 22:08

## 2021-01-21 RX ADMIN — Medication 166.67 MILLIGRAM(S): at 05:02

## 2021-01-21 RX ADMIN — Medication 1 APPLICATION(S): at 14:34

## 2021-01-21 RX ADMIN — Medication 100 MILLIGRAM(S): at 22:08

## 2021-01-21 RX ADMIN — Medication 8: at 08:05

## 2021-01-21 NOTE — PROGRESS NOTE ADULT - SUBJECTIVE AND OBJECTIVE BOX
Podiatry pager #: [phone] ([place])/ [phone] ([facility])    Patient is a 65y old  Male who presents with a chief complaint of right foot wound (20 Jan 2021 17:28)       INTERVAL HPI/OVERNIGHT EVENTS:  Patient seen and evaluated at bedside.  Pt is resting comfortable in NAD. Denies N/V/F/C.     Allergies    bananas (Rash)  Cauliflower (Other)  No Known Drug Allergies    Intolerances        Vital Signs Last 24 Hrs  T(C): 37 (21 Jan 2021 05:30), Max: 37.4 (20 Jan 2021 17:31)  T(F): 98.6 (21 Jan 2021 05:30), Max: 99.3 (20 Jan 2021 17:31)  HR: 58 (21 Jan 2021 05:30) (58 - 65)  BP: 120/68 (21 Jan 2021 05:30) (109/65 - 128/67)  BP(mean): --  RR: 18 (21 Jan 2021 05:30) (17 - 18)  SpO2: 98% (21 Jan 2021 05:30) (97% - 98%)    LABS:                        8.8    10.03 )-----------( 306      ( 21 Jan 2021 08:38 )             27.6     01-21    136  |  106  |  20  ----------------------------<  284<H>  4.8   |  26  |  1.42<H>    Ca    9.3      21 Jan 2021 08:38  Phos  3.9     01-21  Mg     2.2     01-21    TPro  7.8  /  Alb  2.2<L>  /  TBili  0.3  /  DBili  x   /  AST  18  /  ALT  27  /  AlkPhos  117  01-21        CAPILLARY BLOOD GLUCOSE      POCT Blood Glucose.: 309 mg/dL (21 Jan 2021 07:23)  POCT Blood Glucose.: 309 mg/dL (20 Jan 2021 21:50)  POCT Blood Glucose.: 264 mg/dL (20 Jan 2021 16:16)  POCT Blood Glucose.: 80 mg/dL (20 Jan 2021 11:08)  POCT Blood Glucose.: 72 mg/dL (20 Jan 2021 11:06)      Lower Extremity Physical Exam:  Vascular: DP/PT 2/4 B/L, CFT <3 seconds B/L, Temperature gradient warm to cool B/L  Neuro: Epicritic sensation intact to the level of ankle B/L  Musculoskeletal/Ortho: left foot partial 5th ray resection healed  Derm:  Wound #1: right foot submet 5 wound to capsule, tracks 1.5 cm medial, no purulence, no drainage, no erythema, no malodor, etiology: diabetic neuropathy, and 5th toe lateral wound to SubQ    Right foot fluctuance to plantar medial arch   Post sharp excisional debridement of extensive blister revealing plantar medial fibrotic wound to subQ without tracking     RADIOLOGY & ADDITIONAL TESTS:    < from: MR Ankle No Cont, Right (01.20.21 @ 09:54) >  EXAM:  MR ANKLE RT                            PROCEDURE DATE:  01/20/2021          INTERPRETATION:  MR ANKLE RIGHT    HISTORY:  Diabetic foot ulcer. Wound. Evaluate for osteomyelitis. Pain.    TECHNIQUE:  Multiplanar MRI of the right ankle was performed without contrast using 6 sequences.    COMPARISON:  Right foot x-rays dated January 19, 2021    FINDINGS:    OSSEOUS STRUCTURES    Acute Fractures/Contusions:  None.    Chronic Fractures/Ossifications:  Ossification along the tip of the medial malleolus suggests prior ankle sprains.    Marrow:  There is mild marrow edema throughout the proximal diametaphysis of the fourth metatarsal.    Tarsal Coalition:  None.    LIGAMENTS    Talofibular/Calcaneofibular Ligaments:  Intact.    Tibiofibular/Syndesmotic Ligaments:  Intact.    Medial Deltoid Ligament Complex:  Intact.    Subtalar Ligaments:  Intact.    TENDONS    Posterior Tibialis/Medial Flexor Tendons:  Intact.    Peroneal Tendons:  Intact.    Extensor Tendons:  Intact.    ACHILLES TENDON  Intact.    PLANTAR FASCIA  There is near-complete tearing of the central band occurring approximately 3.3 cm from the calcaneal attachment. This tearing extends approximately 4 cm in length. Adjacent plantar soft tissue wound/induration is present with large cutaneous blister.    JOINTS    Tibiotalar Joint:  Preserved.    Subtalar Joints:  Preserved.    Intertarsal Joints:  Tiny scattered midfoot osteophytes are noted.    Tarsometatarsal Joints:  Preserved.    SOFT TISSUES    Masses/Cysts:  Large cutaneous blister is present along the plantar medial midfoot extending along the heel fat-pad.    Musculature:  Generalized increased T2 signal and muscle atrophy is consistent with diabetic neuropathy.    Subcutaneous Tissues:  Mild to moderate subcutaneous edema is present. No discrete abscess is seen. Plantar soft tissue wound is partially imaged at the level of the fifth metatarsal head and neck.    NEUROVASCULAR STRUCTURES    Tarsal Tunnel:  Preserved.    IMPRESSION:  1. Presumed stress related edema involving the fourth metatarsal diaphysis.  2. No other marrow edema is seen to suggest osteomyelitis.  3. Large cutaneous blister is present along the plantar medial midfoot and heel fat-pad with underlying subcutaneous wound/induration.  4. There is adjacent near complete tearing of the central band of the plantar fascia.            VASQUEZ MICHAELS MD; Attending Radiologist  This document has been electronically signed. Jan 20 2021 10:16AM    < end of copied text >

## 2021-01-21 NOTE — PROGRESS NOTE ADULT - ASSESSMENT
64 y/o M right foot 5th met wound to bone, and 5th toe wound to SubQ    - pt seen and evaluated  - afebrile, no leukocytosis  - right foot submet 5 wound to capsule, no purulence, no drainage, no erythema, no malodor, R 5th toe lateral wound to SubQ  - Right foot plantar midfoot extensive blister with fluctuance  - MRI showing no osteo of right foot; large cutaneous blister with fluid collection  - Performed sharp excisional debridement of plantar midfoot blister expressing 15cc of purulence and revealing healthy dermis with plantar medial midfoot wound to subQ measuring 3x3x0.3cm with fibrotic wound base and no tracking  - Flushed with sterile saline and wound culture taken  - Stable for discharge on PO ABx pending ID final recs  - Ordered Santyl collagenase and continue local wound care; no podiatric surgical intervention  - Discharge info in paperwork  - Discussed w/ attending   66 y/o M right foot 5th met wound to bone, and 5th toe wound to SubQ    - pt seen and evaluated  - afebrile, no leukocytosis  - right foot submet 5 wound to capsule, no purulence, no drainage, no erythema, no malodor, R 5th toe lateral wound to SubQ  - Right foot plantar midfoot extensive blister with fluctuance  - ANKLE MRI showing no osteo of right foot; large cutaneous blister with fluid collection  - Performed sharp excisional debridement of plantar midfoot blister expressing 15cc of purulence and revealing healthy dermis with plantar medial midfoot wound to subQ measuring 3x3x0.3cm with fibrotic wound base and no tracking  - Flushed with sterile saline and wound culture taken  - re ordered FOOT MR  - Ordered Santyl collagenase and continue local wound care; no podiatric surgical intervention  - Discharge info in paperwork  - Discussed w/ attending

## 2021-01-21 NOTE — CONSULT NOTE ADULT - SUBJECTIVE AND OBJECTIVE BOX
NEPHROLOGY CONSULTATION    CHIEF COMPLAINT:  IDA      HPI:  Presented 2 days ago with right foot wound, treated with vanco and zosyn.  There has been increase in Cr from 1.0 to 1.4 since admit.      ROS:  10      PAST MEDICAL & SURGICAL HISTORY:  Diabetes  Amputation of left foot  left pink        SOCIAL HISTORY:    FAMILY HISTORY:      MEDICATIONS  (STANDING):  cefepime   IVPB      cefepime   IVPB 2000 milliGRAM(s) IV Intermittent once  collagenase Ointment 1 Application(s) Topical daily  dextrose 40% Gel 15 Gram(s) Oral once  dextrose 5%. 1000 milliLiter(s) (50 mL/Hr) IV Continuous <Continuous>  dextrose 5%. 1000 milliLiter(s) (100 mL/Hr) IV Continuous <Continuous>  dextrose 50% Injectable 25 Gram(s) IV Push once  dextrose 50% Injectable 12.5 Gram(s) IV Push once  dextrose 50% Injectable 25 Gram(s) IV Push once  enoxaparin Injectable 40 milliGRAM(s) SubCutaneous at bedtime  ergocalciferol 99839 Unit(s) Oral <User Schedule>  glucagon  Injectable 1 milliGRAM(s) IntraMuscular once  insulin lispro (ADMELOG) corrective regimen sliding scale   SubCutaneous three times a day before meals  insulin lispro (ADMELOG) corrective regimen sliding scale   SubCutaneous at bedtime  insulin lispro Injectable (ADMELOG) 5 Unit(s) SubCutaneous three times a day before meals  lactated ringers. 1000 milliLiter(s) (75 mL/Hr) IV Continuous <Continuous>  metroNIDAZOLE  IVPB      metroNIDAZOLE  IVPB 500 milliGRAM(s) IV Intermittent every 8 hours  pantoprazole    Tablet 40 milliGRAM(s) Oral before breakfast  polyethylene glycol 3350 17 Gram(s) Oral daily  senna 2 Tablet(s) Oral at bedtime  vancomycin  IVPB 1250 milliGRAM(s) IV Intermittent every 12 hours      PHYSICAL EXAMINATION:  T(F): 97.6 (01-21-21 @ 11:33)  HR: 58 (01-21-21 @ 11:33)  BP: 113/64 (01-21-21 @ 11:33)  RR: 18 (01-21-21 @ 11:33)  SpO2: 97% (01-21-21 @ 11:33)  Conversant, no apparent distress  PERRLA, pink conjunctivae, no ptosis  Good dentition, no pharyngeal erythema  Neck non tender, no mass, no thyromegaly or nodules  Normal respiratory effort, lungs clear to auscultation  Heart with RRR, no murmurs or rubs, no peripheral edema  Abdomen soft, no masses, no organomegaly  Skin no rashes, ulcers or lesions, normal turgor and temperature  Appropriate affect, AO x 3    LABS:                        8.8    10.03 )-----------( 306      ( 21 Jan 2021 08:38 )             27.6     01-21    136  |  106  |  20  ----------------------------<  284<H>  4.8   |  26  |  1.42<H>    Ca    9.3      21 Jan 2021 08:38  Phos  3.9     01-21  Mg     2.2     01-21    TPro  7.8  /  Alb  2.2<L>  /  TBili  0.3  /  DBili  x   /  AST  18  /  ALT  27  /  AlkPhos  117  01-21          ASSESSMENT:  1.  Azotemia, mild, prerenal?    PLAN:  Continue empiric volume expansion and repeat BMP in AM  Urine studies             NEPHROLOGY CONSULTATION    CHIEF COMPLAINT:  IDA      HPI:  Presented 2 days ago with right foot wound, treated with vanco and zosyn.  There has been increase in Cr from 1.0 to 1.4 since admit.      ROS:  10      PAST MEDICAL & SURGICAL HISTORY:  Diabetes  Amputation of left foot  left pink        SOCIAL HISTORY:    FAMILY HISTORY:      MEDICATIONS  (STANDING):  cefepime   IVPB      cefepime   IVPB 2000 milliGRAM(s) IV Intermittent once  collagenase Ointment 1 Application(s) Topical daily  dextrose 40% Gel 15 Gram(s) Oral once  dextrose 5%. 1000 milliLiter(s) (50 mL/Hr) IV Continuous <Continuous>  dextrose 5%. 1000 milliLiter(s) (100 mL/Hr) IV Continuous <Continuous>  dextrose 50% Injectable 25 Gram(s) IV Push once  dextrose 50% Injectable 12.5 Gram(s) IV Push once  dextrose 50% Injectable 25 Gram(s) IV Push once  enoxaparin Injectable 40 milliGRAM(s) SubCutaneous at bedtime  ergocalciferol 81211 Unit(s) Oral <User Schedule>  glucagon  Injectable 1 milliGRAM(s) IntraMuscular once  insulin lispro (ADMELOG) corrective regimen sliding scale   SubCutaneous three times a day before meals  insulin lispro (ADMELOG) corrective regimen sliding scale   SubCutaneous at bedtime  insulin lispro Injectable (ADMELOG) 5 Unit(s) SubCutaneous three times a day before meals  lactated ringers. 1000 milliLiter(s) (75 mL/Hr) IV Continuous <Continuous>  metroNIDAZOLE  IVPB      metroNIDAZOLE  IVPB 500 milliGRAM(s) IV Intermittent every 8 hours  pantoprazole    Tablet 40 milliGRAM(s) Oral before breakfast  polyethylene glycol 3350 17 Gram(s) Oral daily  senna 2 Tablet(s) Oral at bedtime  vancomycin  IVPB 1250 milliGRAM(s) IV Intermittent every 12 hours      PHYSICAL EXAMINATION:  T(F): 97.6 (01-21-21 @ 11:33)  HR: 58 (01-21-21 @ 11:33)  BP: 113/64 (01-21-21 @ 11:33)  RR: 18 (01-21-21 @ 11:33)  SpO2: 97% (01-21-21 @ 11:33)  Conversant, no apparent distress  PERRLA, pink conjunctivae, no ptosis  Good dentition, no pharyngeal erythema  Neck non tender, no mass, no thyromegaly or nodules  Normal respiratory effort, lungs clear to auscultation  Heart with RRR, no murmurs or rubs, no peripheral edema  Abdomen soft, no masses, no organomegaly  Skin no rashes, ulcers or lesions, normal turgor and temperature  Appropriate affect, AO x 3    LABS:                        8.8    10.03 )-----------( 306      ( 21 Jan 2021 08:38 )             27.6     01-21    136  |  106  |  20  ----------------------------<  284<H>  4.8   |  26  |  1.42<H>    Ca    9.3      21 Jan 2021 08:38  Phos  3.9     01-21  Mg     2.2     01-21    TPro  7.8  /  Alb  2.2<L>  /  TBili  0.3  /  DBili  x   /  AST  18  /  ALT  27  /  AlkPhos  117  01-21          ASSESSMENT:  1.  Azotemia, mild, prerenal?    PLAN:  Continue empiric volume expansion and repeat BMP in AM  Urine studies  No renal contraindication to gadolinium administration

## 2021-01-21 NOTE — PROGRESS NOTE ADULT - SUBJECTIVE AND OBJECTIVE BOX
HPI:  This patient is a 65 year old man hx of DM who presents to the ER c/o right foot wound.  Patient states that the wound has been present for 1 week.  He denies fever or trauma to the foot.  He was seen by his podiatrist yesterday and states that Xrays were done but he has no results.     (19 Jan 2021 15:28)      SUBJECTIVE & OBJECTIVE: Pt seen and examined at bedside.   no overnight events.   states pain is controlled     Denies fever, chills, N/V, dizziness, HA, cough, CP, palpitations, SOB, abdominal pain, dysuria, diarrhea, constipation.     PHYSICAL EXAM:  Vital Signs Last 24 Hrs  T(C): 36.4 (21 Jan 2021 11:33), Max: 37.4 (20 Jan 2021 17:31)  T(F): 97.6 (21 Jan 2021 11:33), Max: 99.3 (20 Jan 2021 17:31)  HR: 58 (21 Jan 2021 11:33) (58 - 65)  BP: 113/64 (21 Jan 2021 11:33) (113/64 - 128/67)  BP(mean): --  RR: 18 (21 Jan 2021 11:33) (17 - 18)  SpO2: 97% (21 Jan 2021 11:33) (97% - 98%) on RA       GENERAL: NAD, no increased WOB, nontoxic appearing   HEAD:  Atraumatic, Normocephalic  EYES: EOMI, PERRLA, conjunctiva and sclera clear  ENMT: Moist mucous membranes  NECK: Supple, No JVD  NERVOUS SYSTEM:  Alert & Oriented X3,  no focal neuro deficits  CHEST/LUNG: CTAB  HEART: Regular rate and rhythm; No murmurs, rubs, or gallops  ABDOMEN: Soft, Nontender, Nondistended; Bowel sounds present  EXTREMITIES: open wound at the 5th right metatarsal  with  large tracking blister on the medial aspect extending  to the heel and very tender to palpation ; s/p left 5th metatarsal amputation --well healed     MEDICATIONS  (STANDING):  dextrose 40% Gel 15 Gram(s) Oral once  dextrose 5%. 1000 milliLiter(s) (50 mL/Hr) IV Continuous <Continuous>  dextrose 5%. 1000 milliLiter(s) (100 mL/Hr) IV Continuous <Continuous>  dextrose 50% Injectable 25 Gram(s) IV Push once  dextrose 50% Injectable 12.5 Gram(s) IV Push once  dextrose 50% Injectable 25 Gram(s) IV Push once  enoxaparin Injectable 40 milliGRAM(s) SubCutaneous at bedtime  glucagon  Injectable 1 milliGRAM(s) IntraMuscular once  insulin glargine Injectable (LANTUS) 15 Unit(s) SubCutaneous two times a day  insulin lispro (ADMELOG) corrective regimen sliding scale   SubCutaneous three times a day before meals  insulin lispro (ADMELOG) corrective regimen sliding scale   SubCutaneous at bedtime  insulin lispro Injectable (ADMELOG) 5 Unit(s) SubCutaneous three times a day before meals  mupirocin 2% Ointment 1 Application(s) Topical once  piperacillin/tazobactam IVPB.. 3.375 Gram(s) IV Intermittent every 8 hours  vancomycin  IVPB 1250 milliGRAM(s) IV Intermittent every 12 hours    MEDICATIONS  (PRN):  acetaminophen   Tablet .. 650 milliGRAM(s) Oral every 6 hours PRN Mild Pain (1 - 3)  morphine  - Injectable 2 milliGRAM(s) IV Push every 4 hours PRN Severe Pain (7 - 10)  traMADol 50 milliGRAM(s) Oral every 8 hours PRN Moderate Pain (4 - 6)      LABS:                        8.8    10.03 )-----------( 306      ( 21 Jan 2021 08:38 )             27.6   01-21    136  |  106  |  20  ----------------------------<  284<H>  4.8   |  26  |  1.42<H>    Ca    9.3      21 Jan 2021 08:38  Phos  3.9     01-21  Mg     2.2     01-21    TPro  7.8  /  Alb  2.2<L>  /  TBili  0.3  /  DBili  x   /  AST  18  /  ALT  27  /  AlkPhos  117  01-21      RECENT CULTURES:  Culture - Abscess with Gram Stain (01.20.21 @ 00:32)    Specimen Source: .Abscess foot    Culture Results:   Few Staphylococcus aureus        RADIOLOGY:  < from: MR Ankle No Cont, Right (01.20.21 @ 09:54) >    IMPRESSION:  1. Presumed stress related edema involving the fourth metatarsal diaphysis.  2. No other marrow edema is seen to suggest osteomyelitis.  3. Large cutaneous blister is present along the plantar medial midfoot and heel fat-pad with underlying subcutaneous wound/induration.  4. There is adjacent near complete tearing of the central band of the plantar fascia.    < end of copied text >        Imaging Personally Reviewed:  [ ] YES  [ ] NO    Consultant(s) Notes Reviewed:  [x ] YES  [ ] NO    Care Discussed with Consultants/Other Providers [x ] YES  [ ] NO    Care discussed in detail with patient.  All questions and concerns addressed     HPI:  This patient is a 65 year old man hx of DM who presents to the ER c/o right foot wound.  Patient states that the wound has been present for 1 week.  He denies fever or trauma to the foot.  He was seen by his podiatrist yesterday and states that Xrays were done but he has no results.     (19 Jan 2021 15:28)      SUBJECTIVE & OBJECTIVE: Pt seen and examined at bedside.   no overnight events.   states pain is controlled     Denies fever, chills, N/V, dizziness, HA, cough, CP, palpitations, SOB, abdominal pain, dysuria, diarrhea, constipation.     PHYSICAL EXAM:  Vital Signs Last 24 Hrs  T(C): 36.4 (21 Jan 2021 11:33), Max: 37.4 (20 Jan 2021 17:31)  T(F): 97.6 (21 Jan 2021 11:33), Max: 99.3 (20 Jan 2021 17:31)  HR: 58 (21 Jan 2021 11:33) (58 - 65)  BP: 113/64 (21 Jan 2021 11:33) (113/64 - 128/67)  BP(mean): --  RR: 18 (21 Jan 2021 11:33) (17 - 18)  SpO2: 97% (21 Jan 2021 11:33) (97% - 98%) on RA       GENERAL: NAD, no increased WOB, nontoxic appearing   HEAD:  Atraumatic, Normocephalic  EYES: EOMI, PERRLA, conjunctiva and sclera clear  ENMT: Moist mucous membranes  NECK: Supple, No JVD  NERVOUS SYSTEM:  Alert & Oriented X3,  no focal neuro deficits  CHEST/LUNG: CTAB  HEART: Regular rate and rhythm; No murmurs, rubs, or gallops  ABDOMEN: Soft, Nontender, Nondistended; Bowel sounds present  EXTREMITIES: Rt foot dressing c/d/i    ; s/p left 5th metatarsal amputation --well healed     MEDICATIONS  (STANDING):  dextrose 40% Gel 15 Gram(s) Oral once  dextrose 5%. 1000 milliLiter(s) (50 mL/Hr) IV Continuous <Continuous>  dextrose 5%. 1000 milliLiter(s) (100 mL/Hr) IV Continuous <Continuous>  dextrose 50% Injectable 25 Gram(s) IV Push once  dextrose 50% Injectable 12.5 Gram(s) IV Push once  dextrose 50% Injectable 25 Gram(s) IV Push once  enoxaparin Injectable 40 milliGRAM(s) SubCutaneous at bedtime  glucagon  Injectable 1 milliGRAM(s) IntraMuscular once  insulin glargine Injectable (LANTUS) 15 Unit(s) SubCutaneous two times a day  insulin lispro (ADMELOG) corrective regimen sliding scale   SubCutaneous three times a day before meals  insulin lispro (ADMELOG) corrective regimen sliding scale   SubCutaneous at bedtime  insulin lispro Injectable (ADMELOG) 5 Unit(s) SubCutaneous three times a day before meals  mupirocin 2% Ointment 1 Application(s) Topical once  piperacillin/tazobactam IVPB.. 3.375 Gram(s) IV Intermittent every 8 hours  vancomycin  IVPB 1250 milliGRAM(s) IV Intermittent every 12 hours    MEDICATIONS  (PRN):  acetaminophen   Tablet .. 650 milliGRAM(s) Oral every 6 hours PRN Mild Pain (1 - 3)  morphine  - Injectable 2 milliGRAM(s) IV Push every 4 hours PRN Severe Pain (7 - 10)  traMADol 50 milliGRAM(s) Oral every 8 hours PRN Moderate Pain (4 - 6)      LABS:                        8.8    10.03 )-----------( 306      ( 21 Jan 2021 08:38 )             27.6   01-21    136  |  106  |  20  ----------------------------<  284<H>  4.8   |  26  |  1.42<H>    Ca    9.3      21 Jan 2021 08:38  Phos  3.9     01-21  Mg     2.2     01-21    TPro  7.8  /  Alb  2.2<L>  /  TBili  0.3  /  DBili  x   /  AST  18  /  ALT  27  /  AlkPhos  117  01-21      RECENT CULTURES:  Culture - Abscess with Gram Stain (01.20.21 @ 00:32)    Specimen Source: .Abscess foot    Culture Results:   Few Staphylococcus aureus        RADIOLOGY:  < from: MR Ankle No Cont, Right (01.20.21 @ 09:54) >    IMPRESSION:  1. Presumed stress related edema involving the fourth metatarsal diaphysis.  2. No other marrow edema is seen to suggest osteomyelitis.  3. Large cutaneous blister is present along the plantar medial midfoot and heel fat-pad with underlying subcutaneous wound/induration.  4. There is adjacent near complete tearing of the central band of the plantar fascia.    < end of copied text >        Imaging Personally Reviewed:  [ ] YES  [ ] NO    Consultant(s) Notes Reviewed:  [x ] YES  [ ] NO    Care Discussed with Consultants/Other Providers [x ] YES  [ ] NO    Care discussed in detail with patient.  All questions and concerns addressed

## 2021-01-21 NOTE — PROGRESS NOTE ADULT - ASSESSMENT
65M with poorly controlled DM, s/p left 5th metatarsal amputation 2019,  presenting diabetic foot ulcer    Assessment and Plan:   #Rt foot Diabetic foot Ulcer with tracking subQ edema to the heel   afebrile , WBC 12, ESR and CRP elevated   Rt foot XRay with no subq gas   MRI rt foot --no e/o OM   Podiatry eval appreciated, rec wound care and abx as MRI neg for OM  ID consult appreciated, continue with vanc , zosyn   follow Vanc trough  follow wound Cx     #Dm2, uncontrolled  c/b neuropathy   lantus 15u bid, premeal lispro, ISS   CHO diet   FS goal while hospitalized 140-180   follow A1c     #normocytic anemia   check anemia labs   no e/o bleeding or bruising   monitor H/H   transfuse if h/h <7/21 or if patient symptomatic     #Preventative measures   lovenox SQ-dvt ppx  fall precautions        65M with poorly controlled DM, s/p left 5th metatarsal amputation 2019,  presenting diabetic foot ulcer    Assessment and Plan:   #Rt foot Diabetic foot Ulcer with tracking subQ edema to the heel   afebrile , WBC 12, ESR and CRP elevated   Rt foot XRay with no subq gas   MRI rt ankle--no e/o OM   Vanc trough 17.6   wound culture --prelim few staph aureus   Today 1/21/21 podiatry performed debridement of plantar midfoot blister with 15cc purulence expressed , revealing healthy dermis with plantar medial midfoot wound to subQ measuring 3x3x0.3cm with fibrotic wound base and no tracking. repeat wound Cx sent.   discussed with podiatry Dr. Gill, states that on his assessment he was not able to probe to bone. he reviewed imaging and because MRI was of ankle, distal foot is not very well appreciated. recommended to follow-up on repeat wound culture from debridement today and also recommended MRI foot w/w.o IV contrast.   discussed podiatry plan with ID , and also expressed concern in view of bump in Cr; ID agrees with change of abx from zosyn to cefepime and flagyl, continue with vancomycin, follow pre-4th dose vanc trough   Nephrology consult to assess ability to perform MRI with contrast in view of increase in Cr   awaiting repeat MRI of Rt foot for final abx recs   IVF   pain control prn       #Dm2, uncontrolled due to med noncompliance   c/b neuropathy   A1c 10.9%  lantus 15u bid, premeal lispro, ISS   CHO diet   FS goal while hospitalized 140-180     #AOCD   no e/o bleeding or bruising   anemia labs consistent with AOCD , Ferr elevated, TIBC low   monitor H/H   transfuse if h/h <7/21 or if patient symptomatic     #Vit D deficiency --vit D 50,000iu weekly x 8 weeks supplement     #Preventative measures   lovenox SQ-dvt ppx  fall precautions        65M with poorly controlled DM, s/p left 5th metatarsal amputation 2019,  presenting diabetic foot ulcer    Assessment and Plan:   #Rt foot Diabetic foot Ulcer with tracking subQ edema to the heel   afebrile , WBC 12, ESR and CRP elevated   Rt foot XRay with no subq gas   MRI rt ankle--no e/o OM   Vanc trough 17.6   wound culture --prelim few staph aureus   Today 1/21/21 podiatry performed debridement of plantar midfoot blister with 15cc purulence expressed , revealing healthy dermis with plantar medial midfoot wound to subQ measuring 3x3x0.3cm with fibrotic wound base and no tracking. repeat wound Cx sent.   discussed with podiatry Dr. Gill, states that on his assessment he was not able to probe to bone. he reviewed imaging and because MRI was of ankle, distal foot is not very well appreciated. recommended to follow-up on repeat wound culture from debridement today and also recommended MRI foot w/w.o IV contrast.   discussed podiatry plan with ID , and also expressed concern in view of bump in Cr; ID agrees with change of abx from zosyn to cefepime and flagyl, continue with vancomycin, follow pre-4th dose vanc trough   Nephrology consult to assess ability to perform MRI with contrast in view of increase in Cr   awaiting repeat MRI of Rt foot for final abx recs   IVF   pain control prn     #IDA  most likely pre-renal azotemia   urinating without issues   changed abx from zosyn   IVF   repeat Cr   nephrology consult       #Dm2, uncontrolled due to med noncompliance   c/b neuropathy   A1c 10.9%  lantus 15u bid, premeal lispro, ISS   CHO diet   FS goal while hospitalized 140-180     #AOCD   no e/o bleeding or bruising   anemia labs consistent with AOCD , Ferr elevated, TIBC low   monitor H/H   transfuse if h/h <7/21 or if patient symptomatic     #Vit D deficiency --vit D 50,000iu weekly x 8 weeks supplement     #Preventative measures   lovenox SQ-dvt ppx  fall precautions

## 2021-01-22 LAB
ANION GAP SERPL CALC-SCNC: 5 MMOL/L — SIGNIFICANT CHANGE UP (ref 5–17)
BASOPHILS # BLD AUTO: 0.03 K/UL — SIGNIFICANT CHANGE UP (ref 0–0.2)
BASOPHILS NFR BLD AUTO: 0.3 % — SIGNIFICANT CHANGE UP (ref 0–2)
BUN SERPL-MCNC: 22 MG/DL — SIGNIFICANT CHANGE UP (ref 7–23)
CALCIUM SERPL-MCNC: 9.1 MG/DL — SIGNIFICANT CHANGE UP (ref 8.5–10.1)
CHLORIDE SERPL-SCNC: 108 MMOL/L — SIGNIFICANT CHANGE UP (ref 96–108)
CHLORIDE UR-SCNC: 55 MMOL/L — SIGNIFICANT CHANGE UP
CO2 SERPL-SCNC: 25 MMOL/L — SIGNIFICANT CHANGE UP (ref 22–31)
CREAT ?TM UR-MCNC: 58 MG/DL — SIGNIFICANT CHANGE UP
CREAT SERPL-MCNC: 1.28 MG/DL — SIGNIFICANT CHANGE UP (ref 0.5–1.3)
EOSINOPHIL # BLD AUTO: 0.2 K/UL — SIGNIFICANT CHANGE UP (ref 0–0.5)
EOSINOPHIL NFR BLD AUTO: 2.1 % — SIGNIFICANT CHANGE UP (ref 0–6)
GLUCOSE BLDC GLUCOMTR-MCNC: 211 MG/DL — HIGH (ref 70–99)
GLUCOSE BLDC GLUCOMTR-MCNC: 225 MG/DL — HIGH (ref 70–99)
GLUCOSE BLDC GLUCOMTR-MCNC: 70 MG/DL — SIGNIFICANT CHANGE UP (ref 70–99)
GLUCOSE BLDC GLUCOMTR-MCNC: 78 MG/DL — SIGNIFICANT CHANGE UP (ref 70–99)
GLUCOSE BLDC GLUCOMTR-MCNC: 85 MG/DL — SIGNIFICANT CHANGE UP (ref 70–99)
GLUCOSE SERPL-MCNC: 183 MG/DL — HIGH (ref 70–99)
HCT VFR BLD CALC: 24.7 % — LOW (ref 39–50)
HGB BLD-MCNC: 8.1 G/DL — LOW (ref 13–17)
IMM GRANULOCYTES NFR BLD AUTO: 0.4 % — SIGNIFICANT CHANGE UP (ref 0–1.5)
LYMPHOCYTES # BLD AUTO: 1.97 K/UL — SIGNIFICANT CHANGE UP (ref 1–3.3)
LYMPHOCYTES # BLD AUTO: 20.2 % — SIGNIFICANT CHANGE UP (ref 13–44)
MAGNESIUM SERPL-MCNC: 2 MG/DL — SIGNIFICANT CHANGE UP (ref 1.6–2.6)
MCHC RBC-ENTMCNC: 30.1 PG — SIGNIFICANT CHANGE UP (ref 27–34)
MCHC RBC-ENTMCNC: 32.8 GM/DL — SIGNIFICANT CHANGE UP (ref 32–36)
MCV RBC AUTO: 91.8 FL — SIGNIFICANT CHANGE UP (ref 80–100)
MONOCYTES # BLD AUTO: 1.1 K/UL — HIGH (ref 0–0.9)
MONOCYTES NFR BLD AUTO: 11.3 % — SIGNIFICANT CHANGE UP (ref 2–14)
NEUTROPHILS # BLD AUTO: 6.41 K/UL — SIGNIFICANT CHANGE UP (ref 1.8–7.4)
NEUTROPHILS NFR BLD AUTO: 65.7 % — SIGNIFICANT CHANGE UP (ref 43–77)
NRBC # BLD: 0 /100 WBCS — SIGNIFICANT CHANGE UP (ref 0–0)
PHOSPHATE SERPL-MCNC: 3.7 MG/DL — SIGNIFICANT CHANGE UP (ref 2.5–4.5)
PLATELET # BLD AUTO: 293 K/UL — SIGNIFICANT CHANGE UP (ref 150–400)
POTASSIUM SERPL-MCNC: 4 MMOL/L — SIGNIFICANT CHANGE UP (ref 3.5–5.3)
POTASSIUM SERPL-SCNC: 4 MMOL/L — SIGNIFICANT CHANGE UP (ref 3.5–5.3)
RBC # BLD: 2.69 M/UL — LOW (ref 4.2–5.8)
RBC # FLD: 14.3 % — SIGNIFICANT CHANGE UP (ref 10.3–14.5)
SODIUM SERPL-SCNC: 138 MMOL/L — SIGNIFICANT CHANGE UP (ref 135–145)
SODIUM UR-SCNC: 51 MMOL/L — SIGNIFICANT CHANGE UP
WBC # BLD: 9.75 K/UL — SIGNIFICANT CHANGE UP (ref 3.8–10.5)
WBC # FLD AUTO: 9.75 K/UL — SIGNIFICANT CHANGE UP (ref 3.8–10.5)

## 2021-01-22 PROCEDURE — 73718 MRI LOWER EXTREMITY W/O DYE: CPT | Mod: 26,RT

## 2021-01-22 PROCEDURE — 99232 SBSQ HOSP IP/OBS MODERATE 35: CPT

## 2021-01-22 PROCEDURE — 99233 SBSQ HOSP IP/OBS HIGH 50: CPT

## 2021-01-22 RX ORDER — COLLAGENASE CLOSTRIDIUM HIST. 250 UNIT/G
1 OINTMENT (GRAM) TOPICAL
Qty: 20 | Refills: 0
Start: 2021-01-22 | End: 2021-02-10

## 2021-01-22 RX ORDER — PANTOPRAZOLE SODIUM 20 MG/1
1 TABLET, DELAYED RELEASE ORAL
Qty: 30 | Refills: 0
Start: 2021-01-22 | End: 2021-02-20

## 2021-01-22 RX ORDER — ACETAMINOPHEN 500 MG
3 TABLET ORAL
Qty: 0 | Refills: 0 | DISCHARGE
Start: 2021-01-22

## 2021-01-22 RX ORDER — SENNA PLUS 8.6 MG/1
2 TABLET ORAL
Qty: 60 | Refills: 0
Start: 2021-01-22 | End: 2021-02-20

## 2021-01-22 RX ORDER — TRAMADOL HYDROCHLORIDE 50 MG/1
1 TABLET ORAL
Qty: 20 | Refills: 0
Start: 2021-01-22

## 2021-01-22 RX ADMIN — Medication 5 UNIT(S): at 12:21

## 2021-01-22 RX ADMIN — Medication 1 APPLICATION(S): at 12:51

## 2021-01-22 RX ADMIN — Medication 5 UNIT(S): at 16:44

## 2021-01-22 RX ADMIN — MORPHINE SULFATE 2 MILLIGRAM(S): 50 CAPSULE, EXTENDED RELEASE ORAL at 19:58

## 2021-01-22 RX ADMIN — SODIUM CHLORIDE 75 MILLILITER(S): 9 INJECTION, SOLUTION INTRAVENOUS at 09:01

## 2021-01-22 RX ADMIN — ENOXAPARIN SODIUM 40 MILLIGRAM(S): 100 INJECTION SUBCUTANEOUS at 21:47

## 2021-01-22 RX ADMIN — Medication 1 TABLET(S): at 18:54

## 2021-01-22 RX ADMIN — Medication 100 MILLIGRAM(S): at 05:14

## 2021-01-22 RX ADMIN — CEFEPIME 100 MILLIGRAM(S): 1 INJECTION, POWDER, FOR SOLUTION INTRAMUSCULAR; INTRAVENOUS at 16:44

## 2021-01-22 RX ADMIN — Medication 100 MILLIGRAM(S): at 12:25

## 2021-01-22 RX ADMIN — INSULIN GLARGINE 15 UNIT(S): 100 INJECTION, SOLUTION SUBCUTANEOUS at 07:58

## 2021-01-22 RX ADMIN — Medication 166.67 MILLIGRAM(S): at 05:13

## 2021-01-22 RX ADMIN — Medication 5 UNIT(S): at 07:57

## 2021-01-22 RX ADMIN — MORPHINE SULFATE 2 MILLIGRAM(S): 50 CAPSULE, EXTENDED RELEASE ORAL at 06:45

## 2021-01-22 RX ADMIN — POLYETHYLENE GLYCOL 3350 17 GRAM(S): 17 POWDER, FOR SOLUTION ORAL at 12:21

## 2021-01-22 RX ADMIN — CEFEPIME 100 MILLIGRAM(S): 1 INJECTION, POWDER, FOR SOLUTION INTRAMUSCULAR; INTRAVENOUS at 05:13

## 2021-01-22 RX ADMIN — PANTOPRAZOLE SODIUM 40 MILLIGRAM(S): 20 TABLET, DELAYED RELEASE ORAL at 07:55

## 2021-01-22 RX ADMIN — Medication 4: at 07:56

## 2021-01-22 NOTE — PROGRESS NOTE ADULT - SUBJECTIVE AND OBJECTIVE BOX
BHARGAVI DEAN  MRN-[id number]    Follow Up:  id following for ulcer and rule out OM    Interval History: still has pain in his foot     ROS:    [ ] Unobtainable because:  [ ] All other systems negative    Constitutional: no fever, no chills  Head: no trauma  Eyes: no vision changes, no eye pain  ENT:  no sore throat, no rhinorrhea  Cardiovascular:  no chest pain, no palpitation  Respiratory:  no SOB, no cough  GI:  no abd pain, no vomiting, no diarrhea  urinary: no dysuria, no hematuria, no flank pain  musculoskeletal:  no joint pain, no joint swelling  skin:  no rash  neurology:  no headache, no seizure, no change in mental status  psych: no anxiety, no depression         Allergies  bananas (Rash)  Cauliflower (Other)  No Known Drug Allergies        ANTIMICROBIALS:  cefepime   IVPB    cefepime   IVPB 2000 every 12 hours  metroNIDAZOLE  IVPB    metroNIDAZOLE  IVPB 500 every 8 hours  vancomycin  IVPB 1250 every 12 hours      OTHER MEDS:  acetaminophen   Tablet .. 650 milliGRAM(s) Oral every 6 hours PRN  collagenase Ointment 1 Application(s) Topical daily  dextrose 40% Gel 15 Gram(s) Oral once  dextrose 5%. 1000 milliLiter(s) IV Continuous <Continuous>  dextrose 5%. 1000 milliLiter(s) IV Continuous <Continuous>  dextrose 50% Injectable 25 Gram(s) IV Push once  dextrose 50% Injectable 12.5 Gram(s) IV Push once  dextrose 50% Injectable 25 Gram(s) IV Push once  enoxaparin Injectable 40 milliGRAM(s) SubCutaneous at bedtime  ergocalciferol 43660 Unit(s) Oral <User Schedule>  glucagon  Injectable 1 milliGRAM(s) IntraMuscular once  insulin glargine Injectable (LANTUS) 15 Unit(s) SubCutaneous every morning  insulin lispro (ADMELOG) corrective regimen sliding scale   SubCutaneous three times a day before meals  insulin lispro (ADMELOG) corrective regimen sliding scale   SubCutaneous at bedtime  insulin lispro Injectable (ADMELOG) 5 Unit(s) SubCutaneous three times a day before meals  lactated ringers. 1000 milliLiter(s) IV Continuous <Continuous>  morphine  - Injectable 2 milliGRAM(s) IV Push every 4 hours PRN  pantoprazole    Tablet 40 milliGRAM(s) Oral before breakfast  polyethylene glycol 3350 17 Gram(s) Oral daily  senna 2 Tablet(s) Oral at bedtime  traMADol 50 milliGRAM(s) Oral every 8 hours PRN      Physical Exam:  Vital Signs Last 24 Hrs  T(C): 37.3 (2021 05:28), Max: 37.4 (2021 17:50)  T(F): 99.1 (2021 05:28), Max: 99.4 (2021 17:50)  HR: 90 (2021 11:00) (54 - 90)  BP: 115/58 (2021 11:00) (115/58 - 159/74)  BP(mean): --  RR: 18 (2021 11:00) (18 - 20)  SpO2: 98% (2021 11:00) (97% - 98%)    General:    NAD,  non toxic  Head: atraumatic, normocephalic  Eye: normal sclera and conjunctiva  ENT:    no oral lesions, neck supple  Cardio:     regular S1, S2,  no murmur  Respiratory:    clear b/l,    no wheezing  abd:     soft,   BS +,   no tenderness  :   no CVAT,  no suprapubic tenderness,   no  ventura  Musculoskeletal:   no joint swelling,   no edema  vascular: no central lines, +PIV   Skin:    right foot wrapped in dressing  Neurologic:     no focal deficit  psych: normal affect    WBC Count: 9.75 K/uL ( @ 07:27)  WBC Count: 10.03 K/uL ( @ 08:38)  WBC Count: 9.45 K/uL ( @ 08:14)  WBC Count: 12.54 K/uL ( @ 15:04)                            8.1    9.75  )-----------( 293      ( 2021 07:27 )             24.7           138  |  108  |  22  ----------------------------<  183<H>  4.0   |  25  |  1.28    Ca    9.1      2021 07:27  Phos  3.7       Mg     2.0         TPro  7.8  /  Alb  2.2<L>  /  TBili  0.3  /  DBili  x   /  AST  18  /  ALT  27  /  AlkPhos  117        Urinalysis Basic - ( 2021 20:55 )    Color: Yellow / Appearance: Clear / S.020 / pH: x  Gluc: x / Ketone: Negative  / Bili: Negative / Urobili: Negative mg/dL   Blood: x / Protein: 30 mg/dL / Nitrite: Negative   Leuk Esterase: Negative / RBC: 0-2 /HPF / WBC 0-2   Sq Epi: x / Non Sq Epi: x / Bacteria: Occasional        Creatinine Trend: 1.28<--, 1.28<--, 1.42<--, 1.07<--, 1.05<--      MICROBIOLOGY:  v  .Abscess foot  21   Few Staphylococcus aureus  Few Corynebacterium species "Susceptibilities not performed"  --  Staphylococcus aureus                C-Reactive Protein, Serum: 8.55 ()    Ferritin, Serum: 625 ()    RADIOLOGY:    < from: MR Foot No Cont, Right (21 @ 12:06) >  IMPRESSION:  Skin wound and subcutaneous edema at the plantar aspect of the midfoot is partially visualized. Edema in the musculature at the midfoot, possible myositis. See prior MRIperformed 2021 for details.    Small T2 hyperintense signal in the subcutaneous tissues at the plantar aspect of the proximal fifth metatarsal, question skin wound or cellulitis. No abnormal bone marrow edema signal seen about the fifth metatarsophalangeal joint region, as clinically questioned.    Bone marrow edema is again seen in the fourth metatarsal shaft, question stress reaction. Correlate clinically.    Diffuse muscle edema and fatty atrophy in the forefoot, likely denervation.    < end of copied text >

## 2021-01-22 NOTE — PROGRESS NOTE ADULT - ASSESSMENT
65M with poorly controlled DM presenting diabetic foot ulcer  afebrile, leukocytosis which has improved, high ESR  MRI negative for osteomyelitis though does have large blister   podiatry recommending wound care and antibiotics with no surgery   wound culture was sent->last admission grew providencia and CONS, no MRSA   podiatry states probe to bone which in many instances is osteomyelitis even with negative MRI   will follow the cultures and continue antibiotics for now   needs good glycemic control for healing   MRi negative for osteomyelitis, can go home on PO antibiotics     Suggest-  OK to d/c home  on PO augmentin 875mg BID x7 more days  f/u wound cultures   Physical therapy   podiatry f/u outpatient   good but not too tight glycemic control  good outpatient follow uo for his DM     Discussed with Dr. Maximino Jacob,   Cell: [phone]  Pager [phone]  Infectious Disease Attending  After 5pm/weekends please call [phone] for all inquiries and new consults

## 2021-01-22 NOTE — PHYSICAL THERAPY INITIAL EVALUATION ADULT - ADDITIONAL COMMENTS
Pt reports living in an apartment building w/ no steps to enter and an elevator w/ his mother, brother, and cousin. Pt states that he was independent in ambulation w/ cane or rollator prior to admission. Pt states that he is independent in all other ADLs w/ occasional help from his cousin. Pt states that the apartment has shower/tub combo and regular toilet w/ grab bars all around. pt states that he owns, rollator, rolling walker, straight cane, and a wheel chair. Pt reports that he will not be compliant w/ rolling walker for ambulation.

## 2021-01-22 NOTE — PHYSICAL THERAPY INITIAL EVALUATION ADULT - GAIT TRAINING, PT EVAL
Pt will be able to ambulate 500 ft independently w/ rolling walker after 3 mo of PT to return to all ADLs safely.

## 2021-01-22 NOTE — PROGRESS NOTE ADULT - REASON FOR ADMISSION
right foot wound

## 2021-01-22 NOTE — PHYSICAL THERAPY INITIAL EVALUATION ADULT - PERSONAL SAFETY AND JUDGMENT, REHAB EVAL
pt says he will not be compliant w/ rolling walker for ambulation/intact/at risk behaviors demonstrated

## 2021-01-22 NOTE — PROGRESS NOTE ADULT - SUBJECTIVE AND OBJECTIVE BOX
HPI:  This patient is a 65 year old man hx of DM who presents to the ER c/o right foot wound.  Patient states that the wound has been present for 1 week.  He denies fever or trauma to the foot.  He was seen by his podiatrist yesterday and states that Xrays were done but he has no results.     (19 Jan 2021 15:28)      SUBJECTIVE & OBJECTIVE: Pt seen and examined at bedside.   no overnight events.   states pain is controlled     Denies fever, chills, N/V, dizziness, HA, cough, CP, palpitations, SOB, abdominal pain, dysuria, diarrhea, constipation.     PHYSICAL EXAM:  Vital Signs Last 24 Hrs  T(C): 36.8 (22 Jan 2021 17:20), Max: 37.4 (21 Jan 2021 23:53)  T(F): 98.2 (22 Jan 2021 17:20), Max: 99.4 (21 Jan 2021 23:53)  HR: 70 (22 Jan 2021 17:20) (54 - 90)  BP: 157/67 (22 Jan 2021 17:20) (115/58 - 157/67)  BP(mean): --  RR: 18 (22 Jan 2021 17:20) (18 - 19)  SpO2: 98% (22 Jan 2021 17:20) (97% - 98%) on RA       GENERAL: NAD, no increased WOB, nontoxic appearing   HEAD:  Atraumatic, Normocephalic  EYES: EOMI, PERRLA, conjunctiva and sclera clear  ENMT: Moist mucous membranes  NECK: Supple, No JVD  NERVOUS SYSTEM:  Alert & Oriented X3,  no focal neuro deficits  CHEST/LUNG: CTAB  HEART: Regular rate and rhythm; No murmurs, rubs, or gallops  ABDOMEN: Soft, Nontender, Nondistended; Bowel sounds present  EXTREMITIES: Rt foot dressing c/d/i    ; s/p left 5th metatarsal amputation --well healed     MEDICATIONS  (STANDING):  dextrose 40% Gel 15 Gram(s) Oral once  dextrose 5%. 1000 milliLiter(s) (50 mL/Hr) IV Continuous <Continuous>  dextrose 5%. 1000 milliLiter(s) (100 mL/Hr) IV Continuous <Continuous>  dextrose 50% Injectable 25 Gram(s) IV Push once  dextrose 50% Injectable 12.5 Gram(s) IV Push once  dextrose 50% Injectable 25 Gram(s) IV Push once  enoxaparin Injectable 40 milliGRAM(s) SubCutaneous at bedtime  glucagon  Injectable 1 milliGRAM(s) IntraMuscular once  insulin glargine Injectable (LANTUS) 15 Unit(s) SubCutaneous two times a day  insulin lispro (ADMELOG) corrective regimen sliding scale   SubCutaneous three times a day before meals  insulin lispro (ADMELOG) corrective regimen sliding scale   SubCutaneous at bedtime  insulin lispro Injectable (ADMELOG) 5 Unit(s) SubCutaneous three times a day before meals  mupirocin 2% Ointment 1 Application(s) Topical once  piperacillin/tazobactam IVPB.. 3.375 Gram(s) IV Intermittent every 8 hours  vancomycin  IVPB 1250 milliGRAM(s) IV Intermittent every 12 hours    MEDICATIONS  (PRN):  acetaminophen   Tablet .. 650 milliGRAM(s) Oral every 6 hours PRN Mild Pain (1 - 3)  morphine  - Injectable 2 milliGRAM(s) IV Push every 4 hours PRN Severe Pain (7 - 10)  traMADol 50 milliGRAM(s) Oral every 8 hours PRN Moderate Pain (4 - 6)      LABS:                        8.1    9.75  )-----------( 293      ( 22 Jan 2021 07:27 )             24.7   01-22    138  |  108  |  22  ----------------------------<  183<H>  4.0   |  25  |  1.28    Ca    9.1      22 Jan 2021 07:27  Phos  3.7     01-22  Mg     2.0     01-22    TPro  7.8  /  Alb  2.2<L>  /  TBili  0.3  /  DBili  x   /  AST  18  /  ALT  27  /  AlkPhos  117  01-21      RECENT CULTURES:  Culture - Abscess with Gram Stain (01.20.21 @ 00:32)    Specimen Source: .Abscess foot    Culture Results:   Few Staphylococcus aureus        RADIOLOGY:  < from: MR Ankle No Cont, Right (01.20.21 @ 09:54) >    IMPRESSION:  1. Presumed stress related edema involving the fourth metatarsal diaphysis.  2. No other marrow edema is seen to suggest osteomyelitis.  3. Large cutaneous blister is present along the plantar medial midfoot and heel fat-pad with underlying subcutaneous wound/induration.  4. There is adjacent near complete tearing of the central band of the plantar fascia.    < end of copied text >    < from: MR Foot No Cont, Right (01.22.21 @ 12:06) >  PROCEDURE DATE:  01/22/2021          INTERPRETATION:  CLINICAL INDICATION: Right foot wound, evaluate for fifth MPJ osteomyelitis.    Multiplanar Multisequence MR of the right foot. Images obtained from the midfoot through the forefoot. No IV contrast.    Prior Studies: MRI right foot 1/21/2021. Right foot radiographs 1/19/2021.    FINDINGS:  Skin wound at the plantar aspect of the midfoot is partially visualized. Subcutaneous edema at the plantar aspect of the midfoot is partially visualized. Small T2 hyperintense signal in the subcutaneous tissues at the plantar aspect of the proximal fifth metatarsal. There is edema again seen in the fourth metatarsal shaft. No linear low T1 signal intensity fracture line is seen. There is diffuse muscle edema and fatty atrophy. No abnormal joint effusion is seen. Cystic changes/edema at the navicular cuneiform articulations. Visualized flexor and extensor tendons are grossly intact. No abnormal bone marrow edema is seen about the fifth metatarsophalangeal joint region.    IMPRESSION:  Skin wound and subcutaneous edema at the plantar aspect of the midfoot is partially visualized. Edema in the musculature at the midfoot, possible myositis. See prior MRIperformed 1/21/2021 for details.    Small T2 hyperintense signal in the subcutaneous tissues at the plantar aspect of the proximal fifth metatarsal, question skin wound or cellulitis. No abnormal bone marrow edema signal seen about the fifth metatarsophalangeal joint region, as clinically questioned.    Bone marrow edema is again seen in the fourth metatarsal shaft, question stress reaction. Correlate clinically.    Diffuse muscle edema and fatty atrophy in the forefoot, likely denervation.    < end of copied text >          Imaging Personally Reviewed:  [ ] YES  [ ] NO    Consultant(s) Notes Reviewed:  [x ] YES  [ ] NO    Care Discussed with Consultants/Other Providers [x ] YES  [ ] NO    Care discussed in detail with patient.  All questions and concerns addressed

## 2021-01-22 NOTE — PROGRESS NOTE ADULT - SUBJECTIVE AND OBJECTIVE BOX
Podiatry pager #: [phone]    Patient is a 65y old  Male who presents with a chief complaint of right foot wound (21 Jan 2021 15:09)      HPI:  This patient is a 65 year old man hx of DM who presents to the ER c/o right foot wound.  Patient states that the wound has been present for 1 week.  He denies fever or trauma to the foot.  He was seen by his podiatrist yesterday and states that Xrays were done but he has no results.     (19 Jan 2021 15:28)      PAST MEDICAL & SURGICAL HISTORY:  Diabetes    Amputation of left foot  left pink        MEDICATIONS  (STANDING):  cefepime   IVPB      cefepime   IVPB 2000 milliGRAM(s) IV Intermittent every 12 hours  collagenase Ointment 1 Application(s) Topical daily  dextrose 40% Gel 15 Gram(s) Oral once  dextrose 5%. 1000 milliLiter(s) (50 mL/Hr) IV Continuous <Continuous>  dextrose 5%. 1000 milliLiter(s) (100 mL/Hr) IV Continuous <Continuous>  dextrose 50% Injectable 25 Gram(s) IV Push once  dextrose 50% Injectable 12.5 Gram(s) IV Push once  dextrose 50% Injectable 25 Gram(s) IV Push once  enoxaparin Injectable 40 milliGRAM(s) SubCutaneous at bedtime  ergocalciferol 99001 Unit(s) Oral <User Schedule>  glucagon  Injectable 1 milliGRAM(s) IntraMuscular once  insulin glargine Injectable (LANTUS) 15 Unit(s) SubCutaneous every morning  insulin lispro (ADMELOG) corrective regimen sliding scale   SubCutaneous three times a day before meals  insulin lispro (ADMELOG) corrective regimen sliding scale   SubCutaneous at bedtime  insulin lispro Injectable (ADMELOG) 5 Unit(s) SubCutaneous three times a day before meals  lactated ringers. 1000 milliLiter(s) (75 mL/Hr) IV Continuous <Continuous>  metroNIDAZOLE  IVPB      metroNIDAZOLE  IVPB 500 milliGRAM(s) IV Intermittent every 8 hours  pantoprazole    Tablet 40 milliGRAM(s) Oral before breakfast  polyethylene glycol 3350 17 Gram(s) Oral daily  senna 2 Tablet(s) Oral at bedtime  vancomycin  IVPB 1250 milliGRAM(s) IV Intermittent every 12 hours    MEDICATIONS  (PRN):  acetaminophen   Tablet .. 650 milliGRAM(s) Oral every 6 hours PRN Mild Pain (1 - 3)  morphine  - Injectable 2 milliGRAM(s) IV Push every 4 hours PRN Severe Pain (7 - 10)  traMADol 50 milliGRAM(s) Oral every 8 hours PRN Moderate Pain (4 - 6)      Allergies    bananas (Rash)  Cauliflower (Other)  No Known Drug Allergies    Intolerances        VITALS:    Vital Signs Last 24 Hrs  T(C): 37.3 (22 Jan 2021 05:28), Max: 37.4 (21 Jan 2021 17:50)  T(F): 99.1 (22 Jan 2021 05:28), Max: 99.4 (21 Jan 2021 17:50)  HR: 90 (22 Jan 2021 11:00) (54 - 90)  BP: 115/58 (22 Jan 2021 11:00) (115/58 - 159/74)  BP(mean): --  RR: 18 (22 Jan 2021 11:00) (18 - 20)  SpO2: 98% (22 Jan 2021 11:00) (97% - 98%)    LABS:                          8.1    9.75  )-----------( 293      ( 22 Jan 2021 07:27 )             24.7       01-22    138  |  108  |  22  ----------------------------<  183<H>  4.0   |  25  |  1.28    Ca    9.1      22 Jan 2021 07:27  Phos  3.7     01-22  Mg     2.0     01-22    TPro  7.8  /  Alb  2.2<L>  /  TBili  0.3  /  DBili  x   /  AST  18  /  ALT  27  /  AlkPhos  117  01-21      CAPILLARY BLOOD GLUCOSE      POCT Blood Glucose.: 225 mg/dL (22 Jan 2021 07:45)  POCT Blood Glucose.: 224 mg/dL (21 Jan 2021 21:34)  POCT Blood Glucose.: 102 mg/dL (21 Jan 2021 20:10)  POCT Blood Glucose.: 209 mg/dL (21 Jan 2021 16:43)          LOWER EXTREMITY PHYSICAL EXAM:    Vascular: DP/PT 2/4 B/L, CFT <3 seconds B/L, Temperature gradient warm to cool B/L  Neuro: Epicritic sensation intact to the level of ankle B/L  Musculoskeletal/Ortho: left foot partial 5th ray resection healed  Derm:  Wound #1: right foot submet 5 wound to capsule, tracks 1.5 cm medial, no purulence, no drainage, no erythema, no malodor, etiology: diabetic neuropathy, and 5th toe lateral wound to SubQ    Right foot fluctuance to plantar medial arch   Post sharp excisional debridement of extensive blister revealing plantar medial fibrotic wound to subQ without tracking     Right plantar midfoot fibrotic wound stable without further drainage nor tracking; probing to subQ    RADIOLOGY & ADDITIONAL STUDIES:

## 2021-01-22 NOTE — PROGRESS NOTE ADULT - ASSESSMENT
65M with poorly controlled DM, s/p left 5th metatarsal amputation 2019,  presenting diabetic foot ulcer    Assessment and Plan:   #Rt foot Diabetic foot Ulcer with tracking subQ edema to the heel   afebrile , WBC 12, ESR and CRP elevated   Rt foot XRay with no subq gas   MRI rt ankle--no e/o OM   1/21/21 podiatry performed debridement of plantar midfoot blister with 15cc purulence expressed , revealing healthy dermis with plantar medial midfoot wound to subQ measuring 3x3x0.3cm with fibrotic wound base and no tracking. repeat wound Cx sent.   discussed with podiatry Dr. Gill, states that on his assessment he was not able to probe to bone. he reviewed imaging and because MRI was of ankle, distal foot is not very well appreciated.  wound culture and asbcess Cx  --MSSA  repeat MRI no e/o OM   discussed with  podiatry and  ID   per podiatry, wound DOES NOT PROBE TO BONE ** (it was an erroneous documentation in the initial podiatry note )   will change to augmentin 875mg Q12H x 7 days per ID recs   IVF   pain control prn     #IDA  most likely pre-renal azotemia   urinating without issues   IVF Cr improved       #Dm2, uncontrolled due to med noncompliance   c/b neuropathy   A1c 10.9%  lantus 15u bid,  ISS   d/c premeal lispro  CHO diet   FS goal while hospitalized 140-180     #AOCD   no e/o bleeding or bruising   anemia labs consistent with AOCD , Ferr elevated, TIBC low   monitor H/H   transfuse if h/h <7/21 or if patient symptomatic     #Vit D deficiency --vit D 50,000iu weekly x 8 weeks supplement     #Preventative measures   lovenox SQ-dvt ppx  fall precautions    PT: home with home PT   patient states he has RW at home and also cane

## 2021-01-22 NOTE — PROGRESS NOTE ADULT - ASSESSMENT
64 y/o M right foot 5th met wound to capsule, and 5th toe wound to SubQ    - pt seen and evaluated  - afebrile, no leukocytosis  - right foot submet 5 wound to capsule, no purulence, no drainage, no erythema, no malodor, R 5th toe lateral wound to SubQ  - right plantar midfoot fibrotic wound to subQ; no probing to bone and no further drainage  - Ordered Santyl collagenase and continue local wound care; no podiatric surgical intervention  - Discharge info in paperwork  - Repeat Right foot MRI including toes ordered  - Rec PO ABx per ID recs and no pod plan for surgical intervention nor bone bone biopsy as clinical concern is low and wounds are stable  - Discussed w/ attending   64 y/o M right foot 5th met wound to capsule, and 5th toe wound to SubQ    - pt seen and evaluated  - afebrile, no leukocytosis  - right foot submet 5 wound to capsule, no purulence, no drainage, no erythema, no malodor, R 5th toe lateral wound to SubQ  - right plantar midfoot fibrotic wound to subQ; no probing to bone and no further drainage  - Ordered Santyl collagenase and continue local wound care; no podiatric surgical intervention  - Discharge info in paperwork  - Repeat MRI of right foot showing no osteomyelitis  - Rec PO ABx per ID recs and no pod plan for surgical intervention nor bone bone biopsy as clinical concern is low and wounds are stable  - Stable for discharge  - Discussed w/ attending

## 2021-01-22 NOTE — PHYSICAL THERAPY INITIAL EVALUATION ADULT - BALANCE TRAINING, PT EVAL
Pt will be able to ambulate 500 ft independently w/ rolling walker w/out loosing balance or stumbling after 3 mo of PT to return to all ADLs safely.

## 2021-01-22 NOTE — PHYSICAL THERAPY INITIAL EVALUATION ADULT - GAIT DEVIATIONS NOTED, PT EVAL
decreased terese/decreased step length/decreased stride length/decreased swing-to-stance ratio/decreased weight-shifting ability

## 2021-01-23 ENCOUNTER — TRANSCRIPTION ENCOUNTER (OUTPATIENT)
Age: 66
End: 2021-01-23

## 2021-01-23 VITALS
DIASTOLIC BLOOD PRESSURE: 65 MMHG | HEART RATE: 60 BPM | TEMPERATURE: 98 F | SYSTOLIC BLOOD PRESSURE: 125 MMHG | OXYGEN SATURATION: 100 % | RESPIRATION RATE: 17 BRPM

## 2021-01-23 LAB
GLUCOSE BLDC GLUCOMTR-MCNC: 153 MG/DL — HIGH (ref 70–99)
GLUCOSE BLDC GLUCOMTR-MCNC: 202 MG/DL — HIGH (ref 70–99)
GLUCOSE BLDC GLUCOMTR-MCNC: 224 MG/DL — HIGH (ref 70–99)
HCT VFR BLD CALC: 26.1 % — LOW (ref 39–50)
HGB BLD-MCNC: 8.3 G/DL — LOW (ref 13–17)
MCHC RBC-ENTMCNC: 30 PG — SIGNIFICANT CHANGE UP (ref 27–34)
MCHC RBC-ENTMCNC: 31.8 GM/DL — LOW (ref 32–36)
MCV RBC AUTO: 94.2 FL — SIGNIFICANT CHANGE UP (ref 80–100)
NRBC # BLD: 0 /100 WBCS — SIGNIFICANT CHANGE UP (ref 0–0)
PLATELET # BLD AUTO: 311 K/UL — SIGNIFICANT CHANGE UP (ref 150–400)
RBC # BLD: 2.77 M/UL — LOW (ref 4.2–5.8)
RBC # FLD: 14.2 % — SIGNIFICANT CHANGE UP (ref 10.3–14.5)
WBC # BLD: 10.65 K/UL — HIGH (ref 3.8–10.5)
WBC # FLD AUTO: 10.65 K/UL — HIGH (ref 3.8–10.5)

## 2021-01-23 PROCEDURE — 99239 HOSP IP/OBS DSCHRG MGMT >30: CPT

## 2021-01-23 RX ORDER — ISOPROPYL ALCOHOL, BENZOCAINE .7; .06 ML/ML; ML/ML
1 SWAB TOPICAL
Qty: 100 | Refills: 1
Start: 2021-01-23 | End: 2021-03-13

## 2021-01-23 RX ORDER — SITAGLIPTIN 50 MG/1
1 TABLET, FILM COATED ORAL
Qty: 30 | Refills: 0
Start: 2021-01-23 | End: 2021-02-21

## 2021-01-23 RX ORDER — ERGOCALCIFEROL 1.25 MG/1
1 CAPSULE ORAL
Qty: 7 | Refills: 0
Start: 2021-01-23

## 2021-01-23 RX ORDER — INSULIN DETEMIR 100/ML (3)
15 INSULIN PEN (ML) SUBCUTANEOUS
Qty: 1 | Refills: 0
Start: 2021-01-23 | End: 2021-02-21

## 2021-01-23 RX ADMIN — MORPHINE SULFATE 2 MILLIGRAM(S): 50 CAPSULE, EXTENDED RELEASE ORAL at 05:30

## 2021-01-23 RX ADMIN — TRAMADOL HYDROCHLORIDE 50 MILLIGRAM(S): 50 TABLET ORAL at 08:29

## 2021-01-23 RX ADMIN — Medication 4: at 08:23

## 2021-01-23 RX ADMIN — TRAMADOL HYDROCHLORIDE 50 MILLIGRAM(S): 50 TABLET ORAL at 17:44

## 2021-01-23 RX ADMIN — Medication 1 APPLICATION(S): at 11:51

## 2021-01-23 RX ADMIN — PANTOPRAZOLE SODIUM 40 MILLIGRAM(S): 20 TABLET, DELAYED RELEASE ORAL at 08:23

## 2021-01-23 RX ADMIN — Medication 1 TABLET(S): at 05:30

## 2021-01-23 RX ADMIN — SODIUM CHLORIDE 75 MILLILITER(S): 9 INJECTION, SOLUTION INTRAVENOUS at 05:29

## 2021-01-23 RX ADMIN — INSULIN GLARGINE 15 UNIT(S): 100 INJECTION, SOLUTION SUBCUTANEOUS at 08:24

## 2021-01-23 RX ADMIN — Medication 4: at 11:51

## 2021-01-23 RX ADMIN — SODIUM CHLORIDE 75 MILLILITER(S): 9 INJECTION, SOLUTION INTRAVENOUS at 03:40

## 2021-01-23 NOTE — CHART NOTE - NSCHARTNOTEFT_GEN_A_CORE
Patient seen and examined bedside.     Vital Signs Last 24 Hrs  T(C): 37 (23 Jan 2021 11:25), Max: 37.2 (22 Jan 2021 23:22)  T(F): 98.6 (23 Jan 2021 11:25), Max: 99 (22 Jan 2021 23:22)  HR: 63 (23 Jan 2021 11:25) (63 - 70)  BP: 123/64 (23 Jan 2021 11:25) (123/64 - 167/69)  BP(mean): --  RR: 18 (23 Jan 2021 11:25) (18 - 18)  SpO2: 98% (23 Jan 2021 11:25) (95% - 99%)    GENERAL: NAD, no increased WOB, nontoxic appearing   HEAD:  Atraumatic, Normocephalic  EYES: EOMI, PERRLA, conjunctiva and sclera clear  ENMT: Moist mucous membranes  NECK: Supple, No JVD  NERVOUS SYSTEM:  Alert & Oriented X3,  no focal neuro deficits  CHEST/LUNG: CTAB  HEART: Regular rate and rhythm; No murmurs, rubs, or gallops  ABDOMEN: Soft, Nontender, Nondistended; Bowel sounds present  EXTREMITIES: Rt foot dressing c/d/i    ; s/p left 5th metatarsal amputation --well healed        patient for d/c today  see d/c note
- R foot MR negative for OM  - no acute pod sx intervention, cont with local wound care  - pod stable for d/c on PO abx pending final ID recs  - WBAT to R foot in surgical shoe with weight to heel  - remainder of discharge instructions located in follow up portion of discharge paperwork

## 2021-01-23 NOTE — DISCHARGE NOTE NURSING/CASE MANAGEMENT/SOCIAL WORK - NSDCPEEMAIL_GEN_ALL_CORE
[facility] email [email or web address]

## 2021-01-23 NOTE — DISCHARGE NOTE NURSING/CASE MANAGEMENT/SOCIAL WORK - PATIENT PORTAL LINK FT
You can access the FollowMyHealth Patient Portal offered by [facility] by registering at the following website: [email or web address]. By joining [employer]’s FollowMyHealth portal, you will also be able to view your health information using other applications (apps) compatible with our system.

## 2021-01-23 NOTE — DISCHARGE NOTE NURSING/CASE MANAGEMENT/SOCIAL WORK - NSDCPEWEB_GEN_ALL_CORE
[facility] website --- [email or web address] --- [email or web address]

## 2021-01-24 LAB
CULTURE RESULTS: SIGNIFICANT CHANGE UP
ORGANISM # SPEC MICROSCOPIC CNT: SIGNIFICANT CHANGE UP
ORGANISM # SPEC MICROSCOPIC CNT: SIGNIFICANT CHANGE UP
SPECIMEN SOURCE: SIGNIFICANT CHANGE UP

## 2021-01-26 LAB
CULTURE RESULTS: SIGNIFICANT CHANGE UP
SPECIMEN SOURCE: SIGNIFICANT CHANGE UP

## 2021-01-29 DIAGNOSIS — E11.40 TYPE 2 DIABETES MELLITUS WITH DIABETIC NEUROPATHY, UNSPECIFIED: ICD-10-CM

## 2021-01-29 DIAGNOSIS — Z79.4 LONG TERM (CURRENT) USE OF INSULIN: ICD-10-CM

## 2021-01-29 DIAGNOSIS — B95.61 METHICILLIN SUSCEPTIBLE STAPHYLOCOCCUS AUREUS INFECTION AS THE CAUSE OF DISEASES CLASSIFIED ELSEWHERE: ICD-10-CM

## 2021-01-29 DIAGNOSIS — N17.9 ACUTE KIDNEY FAILURE, UNSPECIFIED: ICD-10-CM

## 2021-01-29 DIAGNOSIS — L97.412 NON-PRESSURE CHRONIC ULCER OF RIGHT HEEL AND MIDFOOT WITH FAT LAYER EXPOSED: ICD-10-CM

## 2021-01-29 DIAGNOSIS — E11.621 TYPE 2 DIABETES MELLITUS WITH FOOT ULCER: ICD-10-CM

## 2021-01-29 DIAGNOSIS — Z91.018 ALLERGY TO OTHER FOODS: ICD-10-CM

## 2021-01-29 DIAGNOSIS — E11.65 TYPE 2 DIABETES MELLITUS WITH HYPERGLYCEMIA: ICD-10-CM

## 2021-01-29 DIAGNOSIS — M79.671 PAIN IN RIGHT FOOT: ICD-10-CM
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
## 2021-01-29 DIAGNOSIS — Z91.14 PATIENT'S OTHER NONCOMPLIANCE WITH MEDICATION REGIMEN: ICD-10-CM
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
## 2021-01-29 DIAGNOSIS — E55.9 VITAMIN D DEFICIENCY, UNSPECIFIED: ICD-10-CM

## 2021-01-29 DIAGNOSIS — Z89.432 ACQUIRED ABSENCE OF LEFT FOOT: ICD-10-CM

## 2021-01-29 DIAGNOSIS — D63.8 ANEMIA IN OTHER CHRONIC DISEASES CLASSIFIED ELSEWHERE: ICD-10-CM

## 2021-02-08 ENCOUNTER — APPOINTMENT (OUTPATIENT)
Dept: WOUND CARE | Facility: CLINIC | Age: 66
End: 2021-02-08

## 2021-03-01 ENCOUNTER — APPOINTMENT (OUTPATIENT)
Dept: WOUND CARE | Facility: CLINIC | Age: 66
End: 2021-03-01

## 2021-03-02 ENCOUNTER — APPOINTMENT (OUTPATIENT)
Dept: VASCULAR SURGERY | Facility: CLINIC | Age: 66
End: 2021-03-02
Payer: MEDICARE

## 2021-03-09 ENCOUNTER — APPOINTMENT (OUTPATIENT)
Dept: VASCULAR SURGERY | Facility: CLINIC | Age: 66
End: 2021-03-09
Payer: MEDICARE

## 2021-03-09 VITALS
DIASTOLIC BLOOD PRESSURE: 67 MMHG | SYSTOLIC BLOOD PRESSURE: 130 MMHG | HEART RATE: 83 BPM | TEMPERATURE: 97.3 F | WEIGHT: 185 LBS | HEIGHT: 76 IN | BODY MASS INDEX: 22.53 KG/M2

## 2021-03-09 DIAGNOSIS — Z86.31 PERSONAL HISTORY OF DIABETIC FOOT ULCER: ICD-10-CM

## 2021-03-09 DIAGNOSIS — L08.9 TYPE 2 DIABETES MELLITUS WITH OTHER SKIN COMPLICATIONS: ICD-10-CM

## 2021-03-09 DIAGNOSIS — E11.628 TYPE 2 DIABETES MELLITUS WITH OTHER SKIN COMPLICATIONS: ICD-10-CM

## 2021-03-09 PROCEDURE — 99202 OFFICE O/P NEW SF 15 MIN: CPT

## 2021-03-09 PROCEDURE — 99072 ADDL SUPL MATRL&STAF TM PHE: CPT

## 2021-03-09 NOTE — HISTORY OF PRESENT ILLNESS
[FreeTextEntry1] : A 65 year old man with poorly controlled diabetes who presents with right foot wound in the heal with local wound destruction. He has neuropathy and pain in the foot. No proper shoe wear

## 2021-03-09 NOTE — PHYSICAL EXAM
[JVD] : no jugular venous distention  [Normal Breath Sounds] : Normal breath sounds [Normal Heart Sounds] : normal heart sounds [1+] : left 1+ [2+] : left 2+ [FreeTextEntry1] : Right foot ulcer: foul smelling with yellow and fibrinous exudate and dry tissue

## 2021-03-09 NOTE — ASSESSMENT
[FreeTextEntry1] : No need for further vascular work up given palpable pulses. Will need proper offloading, wound care and footwear. May benefit from wheal chair for a little while if this would be the only way he can be off his feet.

## 2021-05-24 NOTE — BRIEF OPERATIVE NOTE - NSICDXBRIEFPREOP_GEN_ALL_CORE_FT
Regarding: Ear ache, sore throat   ----- Message from Fabiana Naqvi sent at 5/24/2021  7:47 AM PDT -----  Ear ache and sore throat for 5 days.   PRE-OP DIAGNOSIS:  Other osteomyelitis of left foot 16-Apr-2019 18:15:37 5th met HIRO Garcia Na

## 2021-12-28 ENCOUNTER — APPOINTMENT (OUTPATIENT)
Dept: CARE COORDINATION | Facility: HOME HEALTH | Age: 66
End: 2021-12-28

## 2022-05-04 NOTE — PATIENT PROFILE ADULT - VISION (WITH CORRECTIVE LENSES IF THE PATIENT USUALLY WEARS THEM):
EMERGENCY DEPARTMENT HISTORY AND PHYSICAL EXAM      Date: 5/3/2022  Patient Name: Mechelle Galarza      History of Presenting Illness     Chief Complaint   Patient presents with    Dizziness       History Provided By: Patient and Patient's Wife    HPI: Mechelle Galarza, 73 y.o. male with a past medical history significant for CAD s/p MI, DM2, HTN, ALEKSANDAR presents to the ED with cc of dizziness, lightheadedness, clammy. Onset ~6pm after taking dog out to defecate. States this feels similar to prior MI, did not have pain at that time but did have some nausea and SOB. Denies recent illness such as F/C/N/V/D, cough, CP, SOB, dysuria, frequency. Sx have not changed since onset. Follows with cardiologist Dr. Shannen Ayoub. There are no other complaints, changes, or physical findings at this time. PCP: Kamini Alejo MD    Current Outpatient Medications   Medication Sig Dispense Refill    acetaminophen (TYLENOL) 500 mg tablet Take 2 Tabs by mouth every six (6) hours. 90 Tab 0    oxyCODONE IR (ROXICODONE) 5 mg immediate release tablet Take 1 Tab by mouth every four (4) hours as needed. Max Daily Amount: 30 mg. 600 Tab 0    meloxicam (MOBIC) 7.5 mg tablet Take 2 Tabs by mouth daily. 60 Tab 0    hydrochlorothiazide (HYDRODIURIL) 25 mg tablet Take 25 mg by mouth daily.  lisinopril (PRINIVIL, ZESTRIL) 40 mg tablet Take 40 mg by mouth daily.  sertraline (ZOLOFT) 50 mg tablet Take 50 mg by mouth daily.  clopidogrel (PLAVIX) 75 mg tablet Take 75 mg by mouth daily.  sitaGLIPtin-metFORMIN (JANUMET) 50-1,000 mg per tablet Take 1 Tab by mouth two (2) times daily (with meals). 2 TIMES A DAY, AM AND BEDTIME      glipiZIDE (GLUCOTROL) 10 mg tablet Take 10 mg by mouth daily.  aspirin delayed-release 81 mg tablet Take 81 mg by mouth daily.          Past History     Past Medical History:  Past Medical History:   Diagnosis Date    Arthritis     CAD (coronary artery disease)     Depression     Diabetes ([facility] [address]     Hypertension     Kidney stones     Sleep apnea     PREVIOUSLY DIAGNOSED- LOST WEIGHT, RETESTED AND NOW NEGATIVE PER SLEEP CLINIC       Past Surgical History:  Past Surgical History:   Procedure Laterality Date    HX ORTHOPAEDIC      LEFT KNEE ACL RECONSTRUCTON/ MENISCUS REMOVAL    HX ORTHOPAEDIC      L5 - S1 [address] AND LAMINECTOMY    HI CARDIAC SURG PROCEDURE UNLIST      CABG ( 1 STENTS)       Family History:  Family History   Problem Relation Age of Onset    Cancer Father          OF LEUKEMIA - WORKED AT A [employer]    Diabetes Maternal Grandfather     Diabetes Paternal Grandfather     Anesth Problems Neg Hx        Social History:  Social History     Tobacco Use    Smoking status: Never Smoker    Smokeless tobacco: Never Used   Substance Use Topics    Alcohol use: Yes     Comment: ONCE OR TWICE A MONTH    Drug use: No       Allergies: Allergies   Allergen Reactions    Penicillin G Other (comments)     SYNCOPE         Review of Systems   Constitutional: Negative except as in HPI. Eyes: Negative except as in HPI.  ENT: Negative except as in HPI. Cardiovascular: Negative except as in HPI. Respiratory: Negative except as in HPI. Gastrointestinal: Negative except as in HPI. Genitourinary: Negative except as in HPI. Musculoskeletal: Negative except as in HPI. Integumentary: Negative except as in HPI. Neurological: Negative except as in HPI. Psychiatric: Negative except as in HPI. Endocrine: Negative except as in HPI. Hematologic/Lymphatic: Negative except as in HPI. Allergic/Immunologic: Negative except as in HPI.     Physical Exam   Constitutional: Awake and alert, interactive, NAD  Eyes: PERRL, no injection or scleral icterus, no discharge  HEENT: NCAT, neck supple, MMM, no oropharyngeal exudates  CV: RRR, no m/r/g  Respiratory: CTAB, no r/r/w  GI: Abd soft, nondistended, nontender  : Deferred  MSK: FROM, no joint effusions or edema  Skin: No rashes  Neuro: CN2-12 intact, symmetric facies, fluent speech. Psych: Well-groomed, normal speech, behavior, appropriate mood      Lab and Diagnostic Study Results     Labs -     Recent Results (from the past 12 hour(s))   CBC WITH AUTOMATED DIFF    Collection Time: 05/03/22  7:25 PM   Result Value Ref Range    WBC 11.0 4.1 - 11.1 K/uL    RBC 4.72 4.10 - 5.70 M/uL    HGB 13.2 12.1 - 17.0 g/dL    HCT 37.9 36.6 - 50.3 %    MCV 80.3 80.0 - 99.0 FL    MCH 28.0 26.0 - 34.0 PG    MCHC 34.8 30.0 - 36.5 g/dL    RDW 13.6 11.5 - 14.5 %    PLATELET 102 414 - 588 K/uL    MPV 9.6 8.9 - 12.9 FL    NRBC 0.0 0.0  WBC    ABSOLUTE NRBC 0.00 0.00 - 0.01 K/uL    NEUTROPHILS 80 (H) 32 - 75 %    LYMPHOCYTES 9 (L) 12 - 49 %    MONOCYTES 6 5 - 13 %    EOSINOPHILS 2 0 - 7 %    BASOPHILS 1 0 - 1 %    IMMATURE GRANULOCYTES 2 (H) 0 - 0.5 %    ABS. NEUTROPHILS 9.0 (H) 1.8 - 8.0 K/UL    ABS. LYMPHOCYTES 0.9 0.8 - 3.5 K/UL    ABS. MONOCYTES 0.6 0.0 - 1.0 K/UL    ABS. EOSINOPHILS 0.2 0.0 - 0.4 K/UL    ABS. BASOPHILS 0.1 0.0 - 0.1 K/UL    ABS. IMM. GRANS. 0.2 (H) 0.00 - 0.04 K/UL    DF AUTOMATED     METABOLIC PANEL, COMPREHENSIVE    Collection Time: 05/03/22  7:25 PM   Result Value Ref Range    Sodium 136 136 - 145 mmol/L    Potassium 3.8 3.5 - 5.1 mmol/L    Chloride 103 97 - 108 mmol/L    CO2 27 21 - 32 mmol/L    Anion gap 6 5 - 15 mmol/L    Glucose 207 (H) 65 - 100 mg/dL    BUN 25 (H) 6 - 20 mg/dL    Creatinine 1.36 (H) 0.70 - 1.30 mg/dL    BUN/Creatinine ratio 18 12 - 20      GFR est AA >60 >60 ml/min/1.73m2    GFR est non-AA 52 (L) >60 ml/min/1.73m2    Calcium 9.0 8.5 - 10.1 mg/dL    Bilirubin, total 0.3 0.2 - 1.0 mg/dL    AST (SGOT) 13 (L) 15 - 37 U/L    ALT (SGPT) 17 12 - 78 U/L    Alk.  phosphatase 78 45 - 117 U/L    Protein, total 6.8 6.4 - 8.2 g/dL    Albumin 3.1 (L) 3.5 - 5.0 g/dL    Globulin 3.7 2.0 - 4.0 g/dL    A-G Ratio 0.8 (L) 1.1 - 2.2     TROPONIN-HIGH SENSITIVITY    Collection Time: 05/03/22  7:25 PM   Result Value Ref Range    Troponin-High Sensitivity 14 0 - 76 ng/L       Radiologic Studies -   [unfilled]  CT Results  (Last 48 hours)    None        CXR Results  (Last 48 hours)               05/03/22 1943  XR CHEST PORT Final result    Impression:  No acute findings. Narrative:  Dizziness. Comparison chest x-ray 2014. Findings: 2 frontal views of the chest. Normal cardiomediastinal silhouette. No   vascular congestion or pulmonary edema. The lungs are well-inflated. No   infiltrate, effusion, pneumothorax. No free air under the hemidiaphragms. Bones   grossly intact. Medical Decision Making and ED Course   - I am the first and primary provider for this patient AND AM THE PRIMARY PROVIDER OF RECORD. - I reviewed the vital signs, available nursing notes, past medical history, past surgical history, family history and social history. - Initial assessment performed. The patients presenting problems have been discussed, and the staff are in agreement with the care plan formulated and outlined with them. I have encouraged them to ask questions as they arise throughout their visit. Vital Signs-Reviewed the patient's vital signs. Patient Vitals for the past 12 hrs:   Temp Pulse Resp BP SpO2   05/03/22 2013  66 20 (!) 141/84 93 %   05/03/22 1918  74 18 135/89 97 %   05/03/22 1905 97.9 °F (36.6 °C) 71 18 139/84 97 %       EKG interpretation:         Provider Notes (Medical Decision Making):   73M w/dizziness and cardiac hx c/f ACS equivalent. Will r/o with serial trops and give . EKG nonischemic. CT Head to eval for bleed, mass, however neuro intact, less likely. Dispo pending workup. ED Course:       ED Course as of 05/03/22 2102   Tue May 03, 2022   2024 Creatinine(!): 1.36  Baseline. [YA]   2024 Sodium: 136 [YA]   2024 XR CHEST PORT    IMPRESSION  No acute findings.  [YA]   2045 Troponin-High Sensitivity: 14  Will repeat [YA]   2101 Discussed with cardiologist  Racquel Lm covering for his cardiologist Dr. Abel Stone. Agrees with admission for high risk atypical ACS sx. Admitted to Dr. Emi Cheadle, will get repeat trop at 2hrs. [YA]      ED Course User Index  [YA] Paul Zuleta MD         Consultations:     Cardiologist Dr. Michaela Opoka Dr. Emi Cheadle. Disposition     Disposition: Admitted to Floor Medical Floor the case was discussed with the admitting physician Dr. Emi Cheadle. Admitted      Diagnosis     Clinical Impression:   1. Dizziness    2. Diaphoresis        Attestations:     Owen Anguiano MD Normal vision: sees adequately in most situations; can see medication labels, newsprint

## 2022-07-29 ENCOUNTER — EMERGENCY (EMERGENCY)
Facility: HOSPITAL | Age: 67
LOS: 1 days | Discharge: LEFT BEFORE DISCHARGE | End: 2022-07-29
Attending: STUDENT IN AN ORGANIZED HEALTH CARE EDUCATION/TRAINING PROGRAM | Admitting: STUDENT IN AN ORGANIZED HEALTH CARE EDUCATION/TRAINING PROGRAM

## 2022-07-29 VITALS
SYSTOLIC BLOOD PRESSURE: 152 MMHG | HEART RATE: 90 BPM | RESPIRATION RATE: 16 BRPM | OXYGEN SATURATION: 100 % | HEIGHT: 76 IN | DIASTOLIC BLOOD PRESSURE: 62 MMHG | TEMPERATURE: 97 F

## 2022-07-29 VITALS
SYSTOLIC BLOOD PRESSURE: 155 MMHG | OXYGEN SATURATION: 100 % | DIASTOLIC BLOOD PRESSURE: 81 MMHG | RESPIRATION RATE: 16 BRPM | TEMPERATURE: 98 F | HEART RATE: 68 BPM

## 2022-07-29 DIAGNOSIS — S98.912A COMPLETE TRAUMATIC AMPUTATION OF LEFT FOOT, LEVEL UNSPECIFIED, INITIAL ENCOUNTER: Chronic | ICD-10-CM

## 2022-07-29 LAB
ALBUMIN SERPL ELPH-MCNC: 3.5 G/DL — SIGNIFICANT CHANGE UP (ref 3.3–5)
ALP SERPL-CCNC: 113 U/L — SIGNIFICANT CHANGE UP (ref 40–120)
ALT FLD-CCNC: 11 U/L — SIGNIFICANT CHANGE UP (ref 4–41)
ANION GAP SERPL CALC-SCNC: 8 MMOL/L — SIGNIFICANT CHANGE UP (ref 7–14)
AST SERPL-CCNC: 16 U/L — SIGNIFICANT CHANGE UP (ref 4–40)
BASOPHILS # BLD AUTO: 0.05 K/UL — SIGNIFICANT CHANGE UP (ref 0–0.2)
BASOPHILS NFR BLD AUTO: 0.6 % — SIGNIFICANT CHANGE UP (ref 0–2)
BILIRUB SERPL-MCNC: <0.2 MG/DL — SIGNIFICANT CHANGE UP (ref 0.2–1.2)
BUN SERPL-MCNC: 26 MG/DL — HIGH (ref 7–23)
CALCIUM SERPL-MCNC: 9.3 MG/DL — SIGNIFICANT CHANGE UP (ref 8.4–10.5)
CHLORIDE SERPL-SCNC: 107 MMOL/L — SIGNIFICANT CHANGE UP (ref 98–107)
CO2 SERPL-SCNC: 25 MMOL/L — SIGNIFICANT CHANGE UP (ref 22–31)
CREAT SERPL-MCNC: 0.97 MG/DL — SIGNIFICANT CHANGE UP (ref 0.5–1.3)
CRP SERPL-MCNC: 6.6 MG/L — HIGH
EGFR: 87 ML/MIN/1.73M2 — SIGNIFICANT CHANGE UP
EOSINOPHIL # BLD AUTO: 0.27 K/UL — SIGNIFICANT CHANGE UP (ref 0–0.5)
EOSINOPHIL NFR BLD AUTO: 3.3 % — SIGNIFICANT CHANGE UP (ref 0–6)
ERYTHROCYTE [SEDIMENTATION RATE] IN BLOOD: 97 MM/HR — HIGH (ref 1–15)
FLUAV AG NPH QL: SIGNIFICANT CHANGE UP
FLUBV AG NPH QL: SIGNIFICANT CHANGE UP
GLUCOSE SERPL-MCNC: 144 MG/DL — HIGH (ref 70–99)
HCT VFR BLD CALC: 36 % — LOW (ref 39–50)
HGB BLD-MCNC: 11.3 G/DL — LOW (ref 13–17)
IANC: 4.41 K/UL — SIGNIFICANT CHANGE UP (ref 1.8–7.4)
IMM GRANULOCYTES NFR BLD AUTO: 0.2 % — SIGNIFICANT CHANGE UP (ref 0–1.5)
LYMPHOCYTES # BLD AUTO: 2.65 K/UL — SIGNIFICANT CHANGE UP (ref 1–3.3)
LYMPHOCYTES # BLD AUTO: 32.6 % — SIGNIFICANT CHANGE UP (ref 13–44)
MCHC RBC-ENTMCNC: 30.8 PG — SIGNIFICANT CHANGE UP (ref 27–34)
MCHC RBC-ENTMCNC: 31.4 GM/DL — LOW (ref 32–36)
MCV RBC AUTO: 98.1 FL — SIGNIFICANT CHANGE UP (ref 80–100)
MONOCYTES # BLD AUTO: 0.72 K/UL — SIGNIFICANT CHANGE UP (ref 0–0.9)
MONOCYTES NFR BLD AUTO: 8.9 % — SIGNIFICANT CHANGE UP (ref 2–14)
NEUTROPHILS # BLD AUTO: 4.41 K/UL — SIGNIFICANT CHANGE UP (ref 1.8–7.4)
NEUTROPHILS NFR BLD AUTO: 54.4 % — SIGNIFICANT CHANGE UP (ref 43–77)
NRBC # BLD: 0 /100 WBCS — SIGNIFICANT CHANGE UP
NRBC # FLD: 0 K/UL — SIGNIFICANT CHANGE UP
PLATELET # BLD AUTO: 252 K/UL — SIGNIFICANT CHANGE UP (ref 150–400)
POTASSIUM SERPL-MCNC: 5 MMOL/L — SIGNIFICANT CHANGE UP (ref 3.5–5.3)
POTASSIUM SERPL-SCNC: 5 MMOL/L — SIGNIFICANT CHANGE UP (ref 3.5–5.3)
PROT SERPL-MCNC: 8.1 G/DL — SIGNIFICANT CHANGE UP (ref 6–8.3)
RBC # BLD: 3.67 M/UL — LOW (ref 4.2–5.8)
RBC # FLD: 13.3 % — SIGNIFICANT CHANGE UP (ref 10.3–14.5)
RSV RNA NPH QL NAA+NON-PROBE: SIGNIFICANT CHANGE UP
SARS-COV-2 RNA SPEC QL NAA+PROBE: SIGNIFICANT CHANGE UP
SODIUM SERPL-SCNC: 140 MMOL/L — SIGNIFICANT CHANGE UP (ref 135–145)
WBC # BLD: 8.12 K/UL — SIGNIFICANT CHANGE UP (ref 3.8–10.5)
WBC # FLD AUTO: 8.12 K/UL — SIGNIFICANT CHANGE UP (ref 3.8–10.5)

## 2022-07-29 PROCEDURE — 73620 X-RAY EXAM OF FOOT: CPT | Mod: 26,RT

## 2022-07-29 PROCEDURE — 99284 EMERGENCY DEPT VISIT MOD MDM: CPT

## 2022-07-29 RX ORDER — VANCOMYCIN HCL 1 G
1000 VIAL (EA) INTRAVENOUS ONCE
Refills: 0 | Status: COMPLETED | OUTPATIENT
Start: 2022-07-29 | End: 2022-07-29

## 2022-07-29 RX ORDER — CEFEPIME 1 G/1
1000 INJECTION, POWDER, FOR SOLUTION INTRAMUSCULAR; INTRAVENOUS ONCE
Refills: 0 | Status: COMPLETED | OUTPATIENT
Start: 2022-07-29 | End: 2022-07-29

## 2022-07-29 RX ADMIN — CEFEPIME 100 MILLIGRAM(S): 1 INJECTION, POWDER, FOR SOLUTION INTRAMUSCULAR; INTRAVENOUS at 12:56

## 2022-07-29 NOTE — ED PROVIDER NOTE - PATIENT PORTAL LINK FT
You can access the FollowMyHealth Patient Portal offered by [facility] by registering at the following website: [email or web address]. By joining [employer]’s FollowMyHealth portal, you will also be able to view your health information using other applications (apps) compatible with our system.

## 2022-07-29 NOTE — PROVIDER CONTACT NOTE (OTHER) - BACKGROUND
Pt. is AMA.  ED SW gave him metro cards.  He said he can ambulate to the bus stop with a cane and knows the public transportation route to his home.  He had to leave because he has to take care

## 2022-07-29 NOTE — CONSULT NOTE ADULT - SUBJECTIVE AND OBJECTIVE BOX
Patient is a 67y old  Male who presents with a chief complaint of right hallux wound    HPI:  This is a 67 yr old M, pmh dm, left 5th toe amputation (2016) with c/o right toes wound for the last 2 weeks, swollen, tender, scant amount of drainage. Pt seen his podiatrist Dr Bean Dowling who recommended emergency evaluation. Denies fever, chills, sob, numbness tingling, weakness.    PAST MEDICAL & SURGICAL HISTORY:  Diabetes      Amputation of left foot  left pink          MEDICATIONS  (STANDING):  vancomycin  IVPB. 1000 milliGRAM(s) IV Intermittent once    MEDICATIONS  (PRN):      Allergies    bananas (Rash)  Cauliflower (Other)  No Known Drug Allergies    Intolerances        VITALS:    Vital Signs Last 24 Hrs  T(C): 36.6 (29 Jul 2022 12:31), Max: 36.6 (29 Jul 2022 12:31)  T(F): 97.8 (29 Jul 2022 12:31), Max: 97.8 (29 Jul 2022 12:31)  HR: 68 (29 Jul 2022 12:31) (68 - 90)  BP: 155/81 (29 Jul 2022 12:31) (152/62 - 155/81)  BP(mean): --  RR: 16 (29 Jul 2022 12:31) (16 - 16)  SpO2: 100% (29 Jul 2022 12:31) (100% - 100%)    Parameters below as of 29 Jul 2022 12:31  Patient On (Oxygen Delivery Method): room air        LABS:                          11.3   8.12  )-----------( 252      ( 29 Jul 2022 10:45 )             36.0       07-29    140  |  107  |  26<H>  ----------------------------<  144<H>  5.0   |  25  |  0.97    Ca    9.3      29 Jul 2022 10:45    TPro  8.1  /  Alb  3.5  /  TBili  <0.2  /  DBili  x   /  AST  16  /  ALT  11  /  AlkPhos  113  07-29      CAPILLARY BLOOD GLUCOSE      POCT Blood Glucose.: 193 mg/dL (29 Jul 2022 09:20)          LOWER EXTREMITY PHYSICAL EXAM:    Vascular: DP/PT 0/4, B/L, CFT >3 seconds B/L, Temperature gradient cool to cool, B/L.   Neuro: Epicritic sensation diminished to the level of midfoot, B/L.  Musculoskeletal/Ortho: unremarkable  Skin:  - Right foot: Hallux wound with fibrotic wound bed, probe to bone, tracking about 1cm proximally, and 0.5cm dorsally. Scant pus, erythema extend to IPJ, periwound edema. Hyperkeratotic lesion noted to submet 1 and plantar arch.   - Left foot: no active signs of infection.     RADIOLOGY & ADDITIONAL STUDIES:    Right foot Xray: Resident read: OM to the distal phalanx

## 2022-07-29 NOTE — ED ADULT NURSE REASSESSMENT NOTE - NS ED NURSE REASSESS COMMENT FT1
SW contacted at this time, SW unable to provide transportation due to SMS. BEENA Pereira notified, Chauncey KATHLEEN also notified. SW contacted at this time, SW unable to provide transportation due to AMA.  BEENA Pereira notified, Chauncey KATHLEEN also notified.

## 2022-07-29 NOTE — ED ADULT NURSE REASSESSMENT NOTE - NS ED NURSE REASSESS COMMENT FT1
Discharge huddle done with HEVER Delgadillo, RN Elvia, and RAVEN Kat. Pt A&Ox4, ambulatory with steady gait using cane. Respirations even & unlabored on room air. Pt. requesting to take bus home and was given metrocard by social work.

## 2022-07-29 NOTE — ED PROVIDER NOTE - CLINICAL SUMMARY MEDICAL DECISION MAKING FREE TEXT BOX
This is a 67 yr old M, pmh dm, left 5th toe amputation (2016) with c/o right toes wound for the last 2 weeks, swollen, tender, scant amount of drainage. Pt seen his podiatrist Dr Bean Dowling who recommended emergency evaluation. Denies fever, chills, sob, numbness tingling, weakness.  labs, xray, iv abx , reassess, pod consult

## 2022-07-29 NOTE — ED ADULT NURSE REASSESSMENT NOTE - NS ED NURSE REASSESS COMMENT FT1
Pt is AOX4. Pt denies chest pain or SOB. PT respirations even an unlabored. Pt appears to be comfortable, in NAD. Pt denies fever, chills, n/v/d. Pt denies abdominal pain, dysuria, hematuria. Pt reports wanting to leave AMA. Pt educated with podiatry resident importance of staying, risk of leaving. Pt educated on severity of infection and need for IV ABX. PT understands educations, still requesting to sign out AMA due to carig for his mother. Reports he wants to come back Wednesday after he "settles everything at home". NP Chauncey notified, ED team notified.

## 2022-07-29 NOTE — ED PROVIDER NOTE - ATTENDING APP SHARED VISIT CONTRIBUTION OF CARE
I have evaluated the patient and agree with the documentation and assessment as made by the RAZ. We have discussed plan of care and work up for the patient.   This was a shared visit with the RAZ. I reviewed and verified the documentation and independently performed the documented:   History, Exam and Medical Decision Making.    67M, DM, who is sent in by his podatrist for lower extremity infection. Was evaluated by Dr. Dowling yesterday in the office - due to tenderness, scant discharge, and wound appearance, wanted patient to be admitted for ABX. Symptomatically, complaining of persistent R big toe pain, associated with mild purulent discharge. Otherwise, no headaches, fevers, chills, cough, sputum, cp, sob, abdominal pain, nvd. Physical: afebrile, non-toxic appearing, rrr, ctabl, abdomen soft. R Foot: severe ulcer over the distal/plantar aspect of the great toe with purulent drainage. TTP. No focal fluctuance. No crepitus. No tracking erythema. 1+ pt pulses. SILT over S/S/T/SP/DP. Plan: labs, imaging, podiatry.

## 2022-07-29 NOTE — ED PROVIDER NOTE - OBJECTIVE STATEMENT
This is a 67 yr old M, pmh dm, left 5th toe amputation (2016) with c/o right toes wound for the last 2 weeks, swollen, tender, scant amount of drainage. Pt seen his podiatrist Dr Bean Dowling who recommended emergency evaluation. Denies fever, chills, sob, numbness tingling, weakness.

## 2022-07-29 NOTE — CONSULT NOTE ADULT - ASSESSMENT
67y Male with Right hallux wound to bone  - Patient seen and evaluated.  - T(F): 97.8  - WBC Count: 8.12, ESR 97, CRP .66  - Right foot: Hallux wound with fibrotic wound bed, probe to bone, tracking about 1cm proximally, and 0.5cm dorsally. Scant pus, erythema extend to IPJ, periwound edema. Hyperkeratotic lesion noted to submet 1 and plantar arch.   - Left foot: no active signs of infection.   - Right foot xray shows OM to the distal phalanx, which suggests possible proximal bones in the hallux as well. (Resident read)  - Bedside incision and drainage was performed with sterile suture removal kit to RIght foot hallux area to the depth of bone and not beyond, with mild serosanguineous drainage expressed, and no malodor noted. Irrigated wound with copious amount of saline. Apply packing, followed by DSD. Patient tolerated well with no other incidents.   - Patient was informed that without proper OM work up, IV abx, medicine management, this Right foot hallux infection can progress severe and proximal, which may lead to further infection, tissue loss, further and more proximal amputation, and even life threating symptoms. Patient verbally acknowledged understanding with the witness of ED nurse (Carmelo Draper), however still decided to leave our against medical advice.   - Recommended Augmentin 875 BID for ten days.    - Discussed with attending.

## 2022-07-29 NOTE — ED PROVIDER NOTE - NSFOLLOWUPINSTRUCTIONS_ED_ALL_ED_FT
PLEASE TAKE YOUR MEDICATION AS IT PRESCRIBED TO YOU.   Diabetic Foot Ulcers    WHAT YOU NEED TO KNOW:    A diabetic foot ulcer can be redness over a bony area or an open sore. The ulcer can develop anywhere on your foot or toes. Ulcers usually develop on the bottom of the foot. You may not know you have an ulcer until you notice drainage on your sock. Drainage is fluid that may be yellow, brown, or red. The fluid may also contain pus or blood.  Foot Ulcers         DISCHARGE INSTRUCTIONS:    Call your local emergency number (911 in the ) if:  •You have a fever with chills.      •You begin vomiting.      •You feel faint or become confused.      Return to the emergency department if:   •You see new drainage on your sock.      •Your foot becomes red, warm, and swollen.      •Your foot ulcer has a bad smell or is draining pus.      •You feel pain in a foot that used to have little or no feeling.      •You see black or dead tissue in or around the ulcer.      Call your doctor if:   •The ulcer becomes bigger, deeper, or does not heal.      •You have questions or concerns about your condition or care.      Medicines:   •Antibiotics may be given to help treat a bacterial infection.      •Take your medicine as directed. Contact your healthcare provider if you think your medicine is not helping or if you have side effects. Tell him or her if you are allergic to any medicine. Keep a list of the medicines, vitamins, and herbs you take. Include the amounts, and when and why you take them. Bring the list or the pill bottles to follow-up visits. Carry your medicine list with you in case of an emergency.      Care for the ulcer as directed: A bandage will be put on the ulcer. Your healthcare provider will give you instructions on changing your bandage. You may need to clean the ulcer and change the bandage daily. The bandage may contain medicines to help the ulcer heal. You may be asked to put medicine on the ulcer before putting on the bandage. The medicine may also prevent growth of tissue that is not healthy. You may need to cover the ulcer with a plastic bag while you bathe. Ask your healthcare provider for instructions on bathing until your foot heals.    Prevent diabetic foot ulcers: Good foot care may help prevent ulcers, or keep them from getting worse. Ask someone to help you if you are not able to check your feet by yourself. You or another person may need to do any of the following:  •Keep your blood sugar levels under control. Continue the plan for your diabetes that you and your healthcare provider have discussed. Healthy food choices and taking your medicines as directed may help control blood sugars. Contact your healthcare provider if your blood sugar levels are higher than directed.      •Wash your feet each day with soap and warm water. Do not use hot water, because this can injure your foot. Dry your feet gently with a towel after you wash them. Dry between and under your toes.      •Apply lotion or a moisturizer on your dry feet. Ask your healthcare provider what lotions are best to use. Do not put lotion or moisturizer between your toes. Moisture between your toes could lead to skin breakdown.      •Check your feet each day. Look at your whole foot, including the bottom, and between and under your toes. Check for wounds, corns, and calluses. Feel your feet by running your hands along the tops, bottoms, sides, and between your toes. Use a nonbreakable mirror to check your feet if you have trouble seeing the bottoms. Do not try to remove corns or calluses yourself. File or cut your toenails straight across.  Diabetic Foot Care           •Protect your feet. Do not walk barefoot or wear your shoes without socks. Check your shoes for rocks or other objects that can hurt your feet. Wear cotton socks to help keep your feet dry. Wear socks without toe seams, or wear them with the seams inside out. Change your socks each day. Do not wear socks that are dirty or damp.      •Wear shoes that fit well. Wear shoes that do not rub against any area of your feet. Your shoes should be ½ to ¾ inch (1 to 2 centimeters) longer than your feet. Your shoes should also have extra space around the widest part of your feet. Walking or athletic shoes with laces or straps that adjust are best. Ask your healthcare provider for help to choose shoes that fit you best. Ask him or her if you need to wear an insert, orthotic, or bandage on your feet.  Properly Fitting Shoes           •Do not smoke. Nicotine can cause damage to your blood vessels and increases your risk for foot ulcers. Do not use e-cigarettes or smokeless tobacco in place of cigarettes or to help you quit. They still contain nicotine. Ask your healthcare provider for information if you currently smoke and need help quitting.      •Know the risks if you choose to drink alcohol. Alcohol can cause your blood sugar levels to be low if you use insulin. Alcohol can cause high blood sugar levels and weight gain if you drink too much. A drink of alcohol is 12 ounces of beer, 5 ounces of wine, or 1½ ounces of liquor.      •Maintain a healthy weight. Ask your healthcare provider what a healthy weight is for you. A healthy weight can help you control your diabetes. Ask him or her to help you create a weight loss plan if needed. Even a 10 to 15 pound weight loss can help you better manage your blood sugar level.      Follow up with your diabetes care provider or foot specialist as directed: You may need to return often to have the ulcer checked. The ulcer may be measured to see if it is getting smaller. Bring any offloading devices or footwear to your follow-up visits so your provider or specialist can check them. Write down your questions so you remember to ask them during your visits.

## 2022-07-29 NOTE — ED PROVIDER NOTE - PROGRESS NOTE DETAILS
CAROL ANN: spoke with outpatient podiatristNitesh - requesting ABX, podiatry consult, admission. CAROL ANN: podiatry consulted. They are aware of patient and will evaluate. HEVER Sandhu- pt expressed does not want to be admitted to hospital for abx he requesting to leave, reached out to pod, who will come down and see the patient and further discuss his stay with him. HEVER Sandhu- pt expressed does not want to be admitted to hospital for abx he requesting to leave, and will come back on 8/3 because he has to pay bills and take care of his sick mother. reached out to pod, who will come down and see the patient and further discuss his stay with him. Patient has capacity. Discussed at length that leaving prior to continuing treatment could result in, but not limited to, further deterioration, loosing toes, foot and/or death. Patient expressed understanding and still wants to sign out against medical advice. Advised patient to follow up with their primary care physician tomorrow or return at any time if they change their mind and want to continue the   treatment/work up.  Podiatry talked with patient again, pt does not wish to say. Sent abx to pharmacy. HEVER Sandhu- pt requested transport home, sw made aware they at this time unable to provider transportation, case escalated to ANM. HEVER Kelley pt did not sign AMA from provider called  at 1600, and told patient he reported already on the bus and do understand he left hospital against medical advise, but will be back on Wednesday.

## 2022-07-29 NOTE — ED ADULT TRIAGE NOTE - CHIEF COMPLAINT QUOTE
Pt c/o ulcer to rt big toe, advised to come to ER by podiatrist. Pt denies fever, chills. PMHx: DM, amputation to 5th digit of left toe. FS: 193

## 2022-07-29 NOTE — ED ADULT NURSE NOTE - OBJECTIVE STATEMENT
Pt received to intake 10c, awake and alert, A&OX4, ambulatory. Sent by his podiatrist for emergent evaluation. C/o R great toe pain. R great toe swollen with skin break down. Ulcer to tip of R great toe with open area. Pt states " I got this from wearing size 12 shoe for 2 weeks and I am size 13."Denies fevers. Hx of 5th L toe amputation.  Respirations even and unlabored. Resting comfortably. Denies CP, SOB, N/V, HA, dizziness, palpitations, fatigue. 20G IV placed to  RAC. Bed in lowest position, call bell within reach. Will continue to monitor.

## 2022-07-31 LAB
-  AMIKACIN: SIGNIFICANT CHANGE UP
-  AMOXICILLIN/CLAVULANIC ACID: SIGNIFICANT CHANGE UP
-  AMPICILLIN/SULBACTAM: SIGNIFICANT CHANGE UP
-  AMPICILLIN/SULBACTAM: SIGNIFICANT CHANGE UP
-  AMPICILLIN: SIGNIFICANT CHANGE UP
-  AMPICILLIN: SIGNIFICANT CHANGE UP
-  AZTREONAM: SIGNIFICANT CHANGE UP
-  CEFAZOLIN: SIGNIFICANT CHANGE UP
-  CEFAZOLIN: SIGNIFICANT CHANGE UP
-  CEFEPIME: SIGNIFICANT CHANGE UP
-  CEFOXITIN: SIGNIFICANT CHANGE UP
-  CEFTRIAXONE: SIGNIFICANT CHANGE UP
-  CIPROFLOXACIN: SIGNIFICANT CHANGE UP
-  CLINDAMYCIN: SIGNIFICANT CHANGE UP
-  ERTAPENEM: SIGNIFICANT CHANGE UP
-  ERYTHROMYCIN: SIGNIFICANT CHANGE UP
-  GENTAMICIN: SIGNIFICANT CHANGE UP
-  GENTAMICIN: SIGNIFICANT CHANGE UP
-  LEVOFLOXACIN: SIGNIFICANT CHANGE UP
-  MEROPENEM: SIGNIFICANT CHANGE UP
-  OXACILLIN: SIGNIFICANT CHANGE UP
-  PENICILLIN: SIGNIFICANT CHANGE UP
-  PIPERACILLIN/TAZOBACTAM: SIGNIFICANT CHANGE UP
-  RIFAMPIN: SIGNIFICANT CHANGE UP
-  TETRACYCLINE: SIGNIFICANT CHANGE UP
-  TETRACYCLINE: SIGNIFICANT CHANGE UP
-  TOBRAMYCIN: SIGNIFICANT CHANGE UP
-  TRIMETHOPRIM/SULFAMETHOXAZOLE: SIGNIFICANT CHANGE UP
-  TRIMETHOPRIM/SULFAMETHOXAZOLE: SIGNIFICANT CHANGE UP
-  VANCOMYCIN: SIGNIFICANT CHANGE UP
-  VANCOMYCIN: SIGNIFICANT CHANGE UP
METHOD TYPE: SIGNIFICANT CHANGE UP

## 2022-08-02 LAB
CULTURE RESULTS: SIGNIFICANT CHANGE UP
ORGANISM # SPEC MICROSCOPIC CNT: SIGNIFICANT CHANGE UP
SPECIMEN SOURCE: SIGNIFICANT CHANGE UP

## 2022-08-08 ENCOUNTER — INPATIENT (INPATIENT)
Facility: HOSPITAL | Age: 67
LOS: 6 days | Discharge: HOME | End: 2022-08-15
Attending: HOSPITALIST | Admitting: HOSPITALIST

## 2022-08-08 VITALS
HEIGHT: 76 IN | RESPIRATION RATE: 17 BRPM | HEART RATE: 77 BPM | DIASTOLIC BLOOD PRESSURE: 89 MMHG | TEMPERATURE: 99 F | SYSTOLIC BLOOD PRESSURE: 116 MMHG | OXYGEN SATURATION: 100 %

## 2022-08-08 DIAGNOSIS — S98.912A COMPLETE TRAUMATIC AMPUTATION OF LEFT FOOT, LEVEL UNSPECIFIED, INITIAL ENCOUNTER: Chronic | ICD-10-CM

## 2022-08-08 DIAGNOSIS — M86.60 OTHER CHRONIC OSTEOMYELITIS, UNSPECIFIED SITE: ICD-10-CM

## 2022-08-08 DIAGNOSIS — Z59.89 OTHER PROBLEMS RELATED TO HOUSING AND ECONOMIC CIRCUMSTANCES: ICD-10-CM

## 2022-08-08 LAB
ALBUMIN SERPL ELPH-MCNC: 3.8 G/DL — SIGNIFICANT CHANGE UP (ref 3.3–5)
ALP SERPL-CCNC: 106 U/L — SIGNIFICANT CHANGE UP (ref 40–120)
ALT FLD-CCNC: 28 U/L — SIGNIFICANT CHANGE UP (ref 4–41)
ANION GAP SERPL CALC-SCNC: 12 MMOL/L — SIGNIFICANT CHANGE UP (ref 7–14)
AST SERPL-CCNC: 35 U/L — SIGNIFICANT CHANGE UP (ref 4–40)
BASOPHILS # BLD AUTO: 0.05 K/UL — SIGNIFICANT CHANGE UP (ref 0–0.2)
BASOPHILS NFR BLD AUTO: 0.7 % — SIGNIFICANT CHANGE UP (ref 0–2)
BILIRUB SERPL-MCNC: <0.2 MG/DL — SIGNIFICANT CHANGE UP (ref 0.2–1.2)
BUN SERPL-MCNC: 30 MG/DL — HIGH (ref 7–23)
CALCIUM SERPL-MCNC: 9.6 MG/DL — SIGNIFICANT CHANGE UP (ref 8.4–10.5)
CHLORIDE SERPL-SCNC: 108 MMOL/L — HIGH (ref 98–107)
CO2 SERPL-SCNC: 24 MMOL/L — SIGNIFICANT CHANGE UP (ref 22–31)
CREAT SERPL-MCNC: 1.69 MG/DL — HIGH (ref 0.5–1.3)
CRP SERPL-MCNC: 3.2 MG/L — SIGNIFICANT CHANGE UP
EGFR: 44 ML/MIN/1.73M2 — LOW
EOSINOPHIL # BLD AUTO: 0.38 K/UL — SIGNIFICANT CHANGE UP (ref 0–0.5)
EOSINOPHIL NFR BLD AUTO: 5.3 % — SIGNIFICANT CHANGE UP (ref 0–6)
ERYTHROCYTE [SEDIMENTATION RATE] IN BLOOD: 81 MM/HR — HIGH (ref 1–15)
GLUCOSE BLDC GLUCOMTR-MCNC: 160 MG/DL — HIGH (ref 70–99)
GLUCOSE SERPL-MCNC: 89 MG/DL — SIGNIFICANT CHANGE UP (ref 70–99)
HCT VFR BLD CALC: 34.3 % — LOW (ref 39–50)
HGB BLD-MCNC: 11.3 G/DL — LOW (ref 13–17)
IANC: 3.19 K/UL — SIGNIFICANT CHANGE UP (ref 1.8–7.4)
IMM GRANULOCYTES NFR BLD AUTO: 0.3 % — SIGNIFICANT CHANGE UP (ref 0–1.5)
LYMPHOCYTES # BLD AUTO: 2.99 K/UL — SIGNIFICANT CHANGE UP (ref 1–3.3)
LYMPHOCYTES # BLD AUTO: 41.6 % — SIGNIFICANT CHANGE UP (ref 13–44)
MAGNESIUM SERPL-MCNC: 2.2 MG/DL — SIGNIFICANT CHANGE UP (ref 1.6–2.6)
MCHC RBC-ENTMCNC: 31.1 PG — SIGNIFICANT CHANGE UP (ref 27–34)
MCHC RBC-ENTMCNC: 32.9 GM/DL — SIGNIFICANT CHANGE UP (ref 32–36)
MCV RBC AUTO: 94.5 FL — SIGNIFICANT CHANGE UP (ref 80–100)
MONOCYTES # BLD AUTO: 0.55 K/UL — SIGNIFICANT CHANGE UP (ref 0–0.9)
MONOCYTES NFR BLD AUTO: 7.7 % — SIGNIFICANT CHANGE UP (ref 2–14)
NEUTROPHILS # BLD AUTO: 3.19 K/UL — SIGNIFICANT CHANGE UP (ref 1.8–7.4)
NEUTROPHILS NFR BLD AUTO: 44.4 % — SIGNIFICANT CHANGE UP (ref 43–77)
NRBC # BLD: 0 /100 WBCS — SIGNIFICANT CHANGE UP
NRBC # FLD: 0 K/UL — SIGNIFICANT CHANGE UP
PLATELET # BLD AUTO: 220 K/UL — SIGNIFICANT CHANGE UP (ref 150–400)
POTASSIUM SERPL-MCNC: 4.5 MMOL/L — SIGNIFICANT CHANGE UP (ref 3.5–5.3)
POTASSIUM SERPL-SCNC: 4.5 MMOL/L — SIGNIFICANT CHANGE UP (ref 3.5–5.3)
PROT SERPL-MCNC: 8 G/DL — SIGNIFICANT CHANGE UP (ref 6–8.3)
RBC # BLD: 3.63 M/UL — LOW (ref 4.2–5.8)
RBC # FLD: 14 % — SIGNIFICANT CHANGE UP (ref 10.3–14.5)
SARS-COV-2 RNA SPEC QL NAA+PROBE: SIGNIFICANT CHANGE UP
SODIUM SERPL-SCNC: 144 MMOL/L — SIGNIFICANT CHANGE UP (ref 135–145)
WBC # BLD: 7.18 K/UL — SIGNIFICANT CHANGE UP (ref 3.8–10.5)
WBC # FLD AUTO: 7.18 K/UL — SIGNIFICANT CHANGE UP (ref 3.8–10.5)

## 2022-08-08 PROCEDURE — 73630 X-RAY EXAM OF FOOT: CPT | Mod: 26,RT

## 2022-08-08 PROCEDURE — 99223 1ST HOSP IP/OBS HIGH 75: CPT

## 2022-08-08 PROCEDURE — 99285 EMERGENCY DEPT VISIT HI MDM: CPT

## 2022-08-08 RX ORDER — VANCOMYCIN HCL 1 G
1000 VIAL (EA) INTRAVENOUS ONCE
Refills: 0 | Status: COMPLETED | OUTPATIENT
Start: 2022-08-08 | End: 2022-08-08

## 2022-08-08 RX ORDER — PIPERACILLIN AND TAZOBACTAM 4; .5 G/20ML; G/20ML
3.38 INJECTION, POWDER, LYOPHILIZED, FOR SOLUTION INTRAVENOUS ONCE
Refills: 0 | Status: COMPLETED | OUTPATIENT
Start: 2022-08-08 | End: 2022-08-08

## 2022-08-08 RX ADMIN — Medication 250 MILLIGRAM(S): at 18:29

## 2022-08-08 RX ADMIN — PIPERACILLIN AND TAZOBACTAM 200 GRAM(S): 4; .5 INJECTION, POWDER, LYOPHILIZED, FOR SOLUTION INTRAVENOUS at 17:55

## 2022-08-08 RX ADMIN — PIPERACILLIN AND TAZOBACTAM 3.38 GRAM(S): 4; .5 INJECTION, POWDER, LYOPHILIZED, FOR SOLUTION INTRAVENOUS at 18:15

## 2022-08-08 SDOH — ECONOMIC STABILITY - INCOME SECURITY: OTHER PROBLEMS RELATED TO HOUSING AND ECONOMIC CIRCUMSTANCES: Z59.89

## 2022-08-08 NOTE — ED PROVIDER NOTE - ATTENDING CONTRIBUTION TO CARE
I performed a face-to-face evaluation of the patient and performed a history and physical examination. I agree with the history and physical examination. If this was a PA visit, I personally saw the patient with the PA and performed a substantive portion of the visit including all aspects of the medical decision making.    R great toe infection. Declined amputation a few weeks ago. Was unable to afford his Abx as insurance wouldn't cover them. Eval for osteo: CRP, ESR, CBC, xray.

## 2022-08-08 NOTE — ED PROVIDER NOTE - NS ED ROS FT
CONST: no fevers, no chills  EYES: no pain, no vision changes  ENT: no sore throat, no ear pain, no change in hearing  CV: no chest pain, no leg swelling  RESP: no shortness of breath, no cough  ABD: no abdominal pain, no nausea, no vomiting, no diarrhea  : no dysuria, no flank pain, no hematuria  MSK: no back pain, no extremity pain, +R great toe wound  NEURO: no headache or additional neurologic complaints  HEME: no easy bleeding  SKIN:  no rash

## 2022-08-08 NOTE — ED PROVIDER NOTE - PROGRESS NOTE DETAILS
Christa Freed PGY-3: XR shows OM of distal phalanx of R first digit. Pods aware. Christa Freed PGY-3: Spoke with podiatry. Patient willing to stay in hospital. Will plan for R great toe amputation. To give Vancomyin and Zosyn.

## 2022-08-08 NOTE — ED ADULT NURSE NOTE - OBJECTIVE STATEMENT
Break Coverage : Receive pt in spot 29 alert and oriented x  x 4 c/o wound to R Break Coverage : Receive pt in spot 29 alert and oriented x  x 4 c/o wound to R great toe.  Area necrotic with no drainage. positive pedal pulses  Denies chills, chest pain, sob, dizziness, cough.  IV 20 g RH placed. Labs sent

## 2022-08-08 NOTE — ED ADULT NURSE REASSESSMENT NOTE - NS ED NURSE REASSESS COMMENT FT1
patient was seen by podiatry. NAD. breathing even and unlabored. dressing to right foot noted. meds given as ordered. ate dinner and tolerated well. admitted.

## 2022-08-08 NOTE — ED PROVIDER NOTE - OBJECTIVE STATEMENT
67 year old M with PMH dm, left 5th toe amputation (2016) presenting for evaluation of R toe wound. Patient follows with Dr. Bean Dowling (podiatry). Presented to ED 7/29 and found to have OM of distal phalanx of R great toe. Was offered admission for IV abx and possible amputation, however declined has he needed to take care of his mother. Returns today for evaluation. Was discharged with augmentin, but could not  due to insurance issues. States he has been changing the dressings and taking care of it. Has not noticed bleeding or pus drainage. Has chronic neuropathy, does not endorse increased pain. Denies fevers/chills, difficulty ambulating, CP, SOB, vomiting, RLE trauma.

## 2022-08-08 NOTE — ED ADULT TRIAGE NOTE - CHIEF COMPLAINT QUOTE
pt states he was seen in the ED and told he needed a toe amputation. states he was not able to be admitted and went home, states he cleaned it out at home with "91 proof" and wants to checked out.

## 2022-08-08 NOTE — CONSULT NOTE ADULT - SUBJECTIVE AND OBJECTIVE BOX
Podiatry pager #: [phone]/ [phone]    Patient is a 67y old  Male who presents with a chief complaint of right great toe wound    HPI: 67 yr old M, pmh dm, left 5th toe amputation (2016) with c/o right toe wound for the last 4 weeks, swollen, tender, scant amount of drainage. Pt seen his podiatrist Dr Bean Dowling who recommended emergency evaluation. Denies fever, chills, sob, numbness tingling, weakness.      PAST MEDICAL & SURGICAL HISTORY:  Diabetes      Amputation of left foot  left pink          MEDICATIONS  (STANDING):  piperacillin/tazobactam IVPB... 3.375 Gram(s) IV Intermittent once  vancomycin  IVPB. 1000 milliGRAM(s) IV Intermittent once    MEDICATIONS  (PRN):      Allergies    bananas (Rash)  Cauliflower (Other)  No Known Drug Allergies    Intolerances        VITALS:    Vital Signs Last 24 Hrs  T(C): 37.1 (08 Aug 2022 13:49), Max: 37.1 (08 Aug 2022 13:49)  T(F): 98.8 (08 Aug 2022 13:49), Max: 98.8 (08 Aug 2022 13:49)  HR: 77 (08 Aug 2022 13:49) (77 - 77)  BP: 116/89 (08 Aug 2022 13:49) (116/89 - 116/89)  BP(mean): --  RR: 17 (08 Aug 2022 13:49) (17 - 17)  SpO2: 100% (08 Aug 2022 13:49) (100% - 100%)    Parameters below as of 08 Aug 2022 13:49  Patient On (Oxygen Delivery Method): room air        LABS:                CAPILLARY BLOOD GLUCOSE      POCT Blood Glucose.: 158 mg/dL (08 Aug 2022 13:54)          LOWER EXTREMITY PHYSICAL EXAM:    Vasular: DP/PT 2/4, B/L, CFT < 3 seconds B/L, Temperature gradient warm to cool, B/L.   Neuro: Epicritic sensation absent to the level of the digits, B/L.  Musculoskeletal/Ortho: s/p L foot partial fifth ray resection, healed  Skin: Right foot: distal Hallux wound with fibrotic wound bed to the level of bone, tracking about 1cm proximally, and 0.5cm dorsally. No pus, erythema extend to IPJ, periwound edema. Hyperkeratotic lesion noted to submet 1 and plantar arch.   Wound #1:   Location:  Size:  Depth:  Wound bed:   Drainage:   Odor:   Periwound:  Etiology:     RADIOLOGY & ADDITIONAL STUDIES:

## 2022-08-08 NOTE — ED PROVIDER NOTE - CLINICAL SUMMARY MEDICAL DECISION MAKING FREE TEXT BOX
Cynthia: DM ulcer of R great toe causing infection. Declined amputation a few weeks ago. Was unable to afford his Abx as insurance wouldn't cover them. Eval for osteo: CRP, ESR, CBC, xray.

## 2022-08-08 NOTE — ED PROVIDER NOTE - PHYSICAL EXAMINATION
GENERAL: Awake, alert, NAD  HEENT: NC/AT, moist mucous membranes, PERRL, EOMI  LUNGS: CTAB, no wheezes or crackles   CARDIAC: RRR, no m/r/g  ABDOMEN: Soft, normal BS, non tender, non distended, no rebound, no guarding  BACK: No midline spinal tenderness, no CVA tenderness  EXT: No edema, no calf tenderness, 2+ DP pulses bilaterally. R foot - enlarged great toe, slit like wound to plantar aspect, no active bleeding or pus drainage, sensation intact.   NEURO: A&Ox3. Moving all extremities.  SKIN: Warm and dry. No rash.  PSYCH: Normal affect.

## 2022-08-08 NOTE — CONSULT NOTE ADULT - ASSESSMENT
66 y/o M with right great toe wound  - Pt seen and evaluated  - Afebrile, no leukocytosis  - Distal right hallux wound with fibrotic wound bed to the level of bone, tracking about 1cm proximally, and 0.5cm dorsally. No pus, erythema extend to IPJ, periwound edema. Hyperkeratotic lesion noted to submet 1 and plantar arch.   - R foot wound cultured  - R foot XR: hallux distal phalanx OM, no tracking soft tissue air  - Recommend admit to med and IV Vanc, zosyn  - Ordered right foot MR to evaluate extent of OM  - Pod plan for R foot partial first ray resection pending MR  - Please document medical clearance for right foot surgery under light sedation and local anesthesia  - Discussed with attending

## 2022-08-09 DIAGNOSIS — Z29.9 ENCOUNTER FOR PROPHYLACTIC MEASURES, UNSPECIFIED: ICD-10-CM

## 2022-08-09 DIAGNOSIS — N17.9 ACUTE KIDNEY FAILURE, UNSPECIFIED: ICD-10-CM

## 2022-08-09 DIAGNOSIS — E11.621 TYPE 2 DIABETES MELLITUS WITH FOOT ULCER: ICD-10-CM

## 2022-08-09 DIAGNOSIS — E11.9 TYPE 2 DIABETES MELLITUS WITHOUT COMPLICATIONS: ICD-10-CM

## 2022-08-09 DIAGNOSIS — E78.5 HYPERLIPIDEMIA, UNSPECIFIED: ICD-10-CM

## 2022-08-09 LAB
24R-OH-CALCIDIOL SERPL-MCNC: 34.8 NG/ML — SIGNIFICANT CHANGE UP (ref 30–80)
A1C WITH ESTIMATED AVERAGE GLUCOSE RESULT: 9.2 % — HIGH (ref 4–5.6)
ANION GAP SERPL CALC-SCNC: 10 MMOL/L — SIGNIFICANT CHANGE UP (ref 7–14)
BUN SERPL-MCNC: 22 MG/DL — SIGNIFICANT CHANGE UP (ref 7–23)
CALCIUM SERPL-MCNC: 9.1 MG/DL — SIGNIFICANT CHANGE UP (ref 8.4–10.5)
CHLORIDE SERPL-SCNC: 104 MMOL/L — SIGNIFICANT CHANGE UP (ref 98–107)
CO2 SERPL-SCNC: 26 MMOL/L — SIGNIFICANT CHANGE UP (ref 22–31)
CREAT ?TM UR-MCNC: 151 MG/DL — SIGNIFICANT CHANGE UP
CREAT SERPL-MCNC: 1.09 MG/DL — SIGNIFICANT CHANGE UP (ref 0.5–1.3)
EGFR: 75 ML/MIN/1.73M2 — SIGNIFICANT CHANGE UP
ESTIMATED AVERAGE GLUCOSE: 217 — SIGNIFICANT CHANGE UP
GLUCOSE BLDC GLUCOMTR-MCNC: 129 MG/DL — HIGH (ref 70–99)
GLUCOSE BLDC GLUCOMTR-MCNC: 132 MG/DL — HIGH (ref 70–99)
GLUCOSE BLDC GLUCOMTR-MCNC: 150 MG/DL — HIGH (ref 70–99)
GLUCOSE BLDC GLUCOMTR-MCNC: 162 MG/DL — HIGH (ref 70–99)
GLUCOSE BLDC GLUCOMTR-MCNC: 201 MG/DL — HIGH (ref 70–99)
GLUCOSE SERPL-MCNC: 160 MG/DL — HIGH (ref 70–99)
MAGNESIUM SERPL-MCNC: 2.1 MG/DL — SIGNIFICANT CHANGE UP (ref 1.6–2.6)
PHOSPHATE SERPL-MCNC: 2.9 MG/DL — SIGNIFICANT CHANGE UP (ref 2.5–4.5)
POTASSIUM SERPL-MCNC: 4.2 MMOL/L — SIGNIFICANT CHANGE UP (ref 3.5–5.3)
POTASSIUM SERPL-SCNC: 4.2 MMOL/L — SIGNIFICANT CHANGE UP (ref 3.5–5.3)
SODIUM SERPL-SCNC: 140 MMOL/L — SIGNIFICANT CHANGE UP (ref 135–145)
SODIUM UR-SCNC: 78 MMOL/L — SIGNIFICANT CHANGE UP

## 2022-08-09 PROCEDURE — 99233 SBSQ HOSP IP/OBS HIGH 50: CPT

## 2022-08-09 RX ORDER — DEXTROSE 50 % IN WATER 50 %
12.5 SYRINGE (ML) INTRAVENOUS ONCE
Refills: 0 | Status: DISCONTINUED | OUTPATIENT
Start: 2022-08-09 | End: 2022-08-15

## 2022-08-09 RX ORDER — INSULIN DETEMIR 100/ML (3)
10 INSULIN PEN (ML) SUBCUTANEOUS
Refills: 0 | Status: DISCONTINUED | OUTPATIENT
Start: 2022-08-09 | End: 2022-08-09

## 2022-08-09 RX ORDER — DEXTROSE 50 % IN WATER 50 %
15 SYRINGE (ML) INTRAVENOUS ONCE
Refills: 0 | Status: DISCONTINUED | OUTPATIENT
Start: 2022-08-09 | End: 2022-08-15

## 2022-08-09 RX ORDER — INSULIN DETEMIR 100/ML (3)
10 INSULIN PEN (ML) SUBCUTANEOUS
Refills: 0 | Status: DISCONTINUED | OUTPATIENT
Start: 2022-08-10 | End: 2022-08-11

## 2022-08-09 RX ORDER — HEPARIN SODIUM 5000 [USP'U]/ML
5000 INJECTION INTRAVENOUS; SUBCUTANEOUS EVERY 8 HOURS
Refills: 0 | Status: DISCONTINUED | OUTPATIENT
Start: 2022-08-09 | End: 2022-08-09

## 2022-08-09 RX ORDER — DEXTROSE 50 % IN WATER 50 %
25 SYRINGE (ML) INTRAVENOUS ONCE
Refills: 0 | Status: DISCONTINUED | OUTPATIENT
Start: 2022-08-09 | End: 2022-08-15

## 2022-08-09 RX ORDER — SODIUM CHLORIDE 9 MG/ML
1000 INJECTION, SOLUTION INTRAVENOUS
Refills: 0 | Status: DISCONTINUED | OUTPATIENT
Start: 2022-08-09 | End: 2022-08-09

## 2022-08-09 RX ORDER — INSULIN DETEMIR 100/ML (3)
12 INSULIN PEN (ML) SUBCUTANEOUS ONCE
Refills: 0 | Status: COMPLETED | OUTPATIENT
Start: 2022-08-09 | End: 2022-08-09

## 2022-08-09 RX ORDER — GLUCAGON INJECTION, SOLUTION 0.5 MG/.1ML
1 INJECTION, SOLUTION SUBCUTANEOUS ONCE
Refills: 0 | Status: DISCONTINUED | OUTPATIENT
Start: 2022-08-09 | End: 2022-08-09

## 2022-08-09 RX ORDER — KETOROLAC TROMETHAMINE 30 MG/ML
15 SYRINGE (ML) INJECTION ONCE
Refills: 0 | Status: DISCONTINUED | OUTPATIENT
Start: 2022-08-09 | End: 2022-08-09

## 2022-08-09 RX ORDER — INSULIN LISPRO 100/ML
VIAL (ML) SUBCUTANEOUS
Refills: 0 | Status: DISCONTINUED | OUTPATIENT
Start: 2022-08-09 | End: 2022-08-15

## 2022-08-09 RX ORDER — LANOLIN ALCOHOL/MO/W.PET/CERES
3 CREAM (GRAM) TOPICAL AT BEDTIME
Refills: 0 | Status: DISCONTINUED | OUTPATIENT
Start: 2022-08-09 | End: 2022-08-13

## 2022-08-09 RX ORDER — PIPERACILLIN AND TAZOBACTAM 4; .5 G/20ML; G/20ML
3.38 INJECTION, POWDER, LYOPHILIZED, FOR SOLUTION INTRAVENOUS EVERY 8 HOURS
Refills: 0 | Status: DISCONTINUED | OUTPATIENT
Start: 2022-08-09 | End: 2022-08-13

## 2022-08-09 RX ORDER — INSULIN DETEMIR 100/ML (3)
15 INSULIN PEN (ML) SUBCUTANEOUS AT BEDTIME
Refills: 0 | Status: DISCONTINUED | OUTPATIENT
Start: 2022-08-09 | End: 2022-08-15

## 2022-08-09 RX ORDER — ACETAMINOPHEN 500 MG
650 TABLET ORAL EVERY 6 HOURS
Refills: 0 | Status: DISCONTINUED | OUTPATIENT
Start: 2022-08-09 | End: 2022-08-10

## 2022-08-09 RX ORDER — ONDANSETRON 8 MG/1
4 TABLET, FILM COATED ORAL EVERY 8 HOURS
Refills: 0 | Status: DISCONTINUED | OUTPATIENT
Start: 2022-08-09 | End: 2022-08-13

## 2022-08-09 RX ORDER — IBUPROFEN 200 MG
400 TABLET ORAL ONCE
Refills: 0 | Status: DISCONTINUED | OUTPATIENT
Start: 2022-08-09 | End: 2022-08-10

## 2022-08-09 RX ORDER — ENOXAPARIN SODIUM 100 MG/ML
40 INJECTION SUBCUTANEOUS EVERY 24 HOURS
Refills: 0 | Status: DISCONTINUED | OUTPATIENT
Start: 2022-08-09 | End: 2022-08-15

## 2022-08-09 RX ORDER — INSULIN LISPRO 100/ML
VIAL (ML) SUBCUTANEOUS AT BEDTIME
Refills: 0 | Status: DISCONTINUED | OUTPATIENT
Start: 2022-08-09 | End: 2022-08-15

## 2022-08-09 RX ADMIN — HEPARIN SODIUM 5000 UNIT(S): 5000 INJECTION INTRAVENOUS; SUBCUTANEOUS at 05:53

## 2022-08-09 RX ADMIN — Medication 1: at 11:39

## 2022-08-09 RX ADMIN — PIPERACILLIN AND TAZOBACTAM 25 GRAM(S): 4; .5 INJECTION, POWDER, LYOPHILIZED, FOR SOLUTION INTRAVENOUS at 03:02

## 2022-08-09 RX ADMIN — HEPARIN SODIUM 5000 UNIT(S): 5000 INJECTION INTRAVENOUS; SUBCUTANEOUS at 14:45

## 2022-08-09 RX ADMIN — Medication 15 UNIT(S): at 22:36

## 2022-08-09 RX ADMIN — Medication 12 UNIT(S): at 03:16

## 2022-08-09 RX ADMIN — ENOXAPARIN SODIUM 40 MILLIGRAM(S): 100 INJECTION SUBCUTANEOUS at 22:35

## 2022-08-09 RX ADMIN — PIPERACILLIN AND TAZOBACTAM 25 GRAM(S): 4; .5 INJECTION, POWDER, LYOPHILIZED, FOR SOLUTION INTRAVENOUS at 11:16

## 2022-08-09 RX ADMIN — PIPERACILLIN AND TAZOBACTAM 25 GRAM(S): 4; .5 INJECTION, POWDER, LYOPHILIZED, FOR SOLUTION INTRAVENOUS at 18:31

## 2022-08-09 NOTE — PATIENT PROFILE ADULT - DO YOU FEEL THREATENED BY OTHERS?
----- Message from Chema Harris MD sent at 7/15/2019 11:29 AM CDT -----  Yes. Ok to add ERCP for me tomorrow.      ----- Message -----  From: Gricel Britton MA  Sent: 7/15/2019  11:17 AM  To: Chema Harris MD    Please review and advise if I can add tomorrow. They are getting labs now  ----- Message -----  From: Marie Foreman RN  Sent: 7/15/2019  11:08 AM  To: Gricel Britton MA    New pancreas cancer patient.  Path in care everywhere.    She needs ERCP and stent soon.    Getting labs today.    Thanks,,  Dorothea       no

## 2022-08-09 NOTE — H&P ADULT - NSHPSOCIALHISTORY_GEN_ALL_CORE
Smokes 1 cigarette daily. Denies alcohol use. Uses cocaine about once a month   Lives with mom and 2 brothers

## 2022-08-09 NOTE — H&P ADULT - PROBLEM SELECTOR PLAN 1
-p/w infected diabetic ulcer of great toe. Xray c/f for osteomyelitis  -elevated ESR noted   -MRI pending to further eval  -past wound cultures reviewed, MSSA, enterococcus. Will c/w zosyn for now. MRSA swab ordered  -repeat wound cultures sent, f/u  -appreciate pods recs, Pod plan for R foot partial first ray resection pending MR  -Pt's RCRI score of 1. Low-mod risk for moderate risk procedure. Pending EKG and BP management for optimization

## 2022-08-09 NOTE — H&P ADULT - HISTORY OF PRESENT ILLNESS
67 year old M with PMH DM c/b peripheral neuropathy, HLD, left 5th toe amputation (2016) presenting for evaluation of R toe wound. Patient follows with Dr. Bean Dowling (podiatry). Presented to ED 7/29 and found to have OM of distal phalanx of R great toe. Was offered admission for IV abx and possible amputation, however declined as he needed to take care of his mother. Returns today for evaluation. Was discharged with augmentin, but could not  due to insurance issues. States he has been changing the dressings and taking care of it. Has not noticed bleeding or pus drainage. Has chronic neuropathy, does not endorse increased pain. Denies fevers/chills, difficulty ambulating, CP, SOB, vomiting, RLE trauma. States the wound was a result of wearing tight shoes and subsequently became infected. Denies chest pain or SOB on exertion. Tolerated anesthesia in the past without adverse reaction.

## 2022-08-09 NOTE — PROGRESS NOTE ADULT - SUBJECTIVE AND OBJECTIVE BOX
Podiatry pager #: [phone] ([place])/ [phone] ([facility])    Patient is a 67y old  Male who presents with a chief complaint of toe wound (09 Aug 2022 01:31)       INTERVAL HPI/OVERNIGHT EVENTS:  Patient seen and evaluated at bedside.  Pt is resting comfortable in NAD. Denies N/V/F/C.     Allergies    bananas (Rash)  Cauliflower (Other)  No Known Drug Allergies    Intolerances        Vital Signs Last 24 Hrs  T(C): 36.6 (09 Aug 2022 10:27), Max: 37.1 (08 Aug 2022 13:49)  T(F): 97.8 (09 Aug 2022 10:27), Max: 98.8 (08 Aug 2022 13:49)  HR: 65 (09 Aug 2022 10:27) (63 - 77)  BP: 139/83 (09 Aug 2022 10:27) (116/89 - 157/92)  BP(mean): --  RR: 20 (09 Aug 2022 10:27) (16 - 20)  SpO2: 100% (09 Aug 2022 10:27) (100% - 100%)    Parameters below as of 09 Aug 2022 10:27  Patient On (Oxygen Delivery Method): room air        LABS:                        11.3   7.18  )-----------( 220      ( 08 Aug 2022 16:45 )             34.3     08-09    140  |  104  |  22  ----------------------------<  160<H>  4.2   |  26  |  1.09    Ca    9.1      09 Aug 2022 06:45  Phos  2.9     08-09  Mg     2.10     08-09    TPro  8.0  /  Alb  3.8  /  TBili  <0.2  /  DBili  x   /  AST  35  /  ALT  28  /  AlkPhos  106  08-08        CAPILLARY BLOOD GLUCOSE      POCT Blood Glucose.: 129 mg/dL (09 Aug 2022 07:34)  POCT Blood Glucose.: 201 mg/dL (09 Aug 2022 03:15)  POCT Blood Glucose.: 160 mg/dL (08 Aug 2022 21:50)  POCT Blood Glucose.: 158 mg/dL (08 Aug 2022 13:54)      Lower Extremity Physical Exam:    Vasular: DP/PT 2/4, B/L, CFT < 3 seconds B/L, Temperature gradient warm to cool, B/L.   Neuro: Epicritic sensation absent to the level of the digits, B/L.  Musculoskeletal/Ortho: s/p L foot partial fifth ray resection, healed  Skin: Right foot distal Hallux fibronecrotic wound to bone, tracking about 1cm proximally, dorsally. No pus, resolving erythema to IPJ, periwound edema, hperkeratotic lesion noted to submet 1 and plantar arch.   RADIOLOGY & ADDITIONAL TESTS:

## 2022-08-09 NOTE — H&P ADULT - ASSESSMENT
67 year old M with PMH DM c/b peripheral neuropathy, HLD, left 5th toe amputation (2016) presents for suspected left great toe osteomyelitis

## 2022-08-09 NOTE — PATIENT PROFILE ADULT - FALL HARM RISK - HARM RISK INTERVENTIONS

## 2022-08-09 NOTE — H&P ADULT - NSHPREVIEWOFSYSTEMS_GEN_ALL_CORE
CONSTITUTIONAL:  No weight loss, fever, chills, weakness or fatigue.  HEENT:  Eyes:  No visual loss, blurred vision, double vision or yellow sclerae. Ears, Nose, Throat:  No hearing loss, sneezing, congestion, runny nose or sore throat.  SKIN:  No rash or itching.  CARDIOVASCULAR:  No chest pain, chest pressure or chest discomfort. No palpitations or edema.  RESPIRATORY:  No shortness of breath, cough or sputum.  GASTROINTESTINAL:  No anorexia, nausea, vomiting or diarrhea. No abdominal pain or blood.  GENITOURINARY:  Denies hematuria, dysuria.   NEUROLOGICAL:  No headache, dizziness, syncope, paralysis, ataxia, numbness or tingling in the extremities. No change in bowel or bladder control.  MUSCULOSKELETAL: +right toe pain No muscle, back pain, joint pain or stiffness.  HEMATOLOGIC:  No anemia, bleeding or bruising.  LYMPHATICS:  No enlarged nodes. No history of splenectomy.  PSYCHIATRIC:  No history of depression or anxiety.  ENDOCRINOLOGIC:  No reports of sweating, cold or heat intolerance. No polyuria or polydipsia.  ALLERGIES:  No history of asthma, hives, eczema or rhinitis.

## 2022-08-09 NOTE — H&P ADULT - PROBLEM SELECTOR PLAN 3
-on levemir 15 units in AM, 20 in PM  -c/w 10 units in AM, 15 in PM  -f/u hga1c  -elevated BP reading noted. Trend. Can start on ACE if Cr improves   -monitor FS

## 2022-08-09 NOTE — PROGRESS NOTE ADULT - PROBLEM SELECTOR PLAN 3
poorly controlled w/ Hb A1c 9.2%, oddly on levemir 15 units in AM, 20 in PM  - c/w 10 am and 15 units in pm  - c/w DM diet, BUD packer

## 2022-08-09 NOTE — PROGRESS NOTE ADULT - ASSESSMENT
67M uncontrolled DM2 (HbA1c 9.2%) c/b peripheral neuropathy, HLD, left 5th toe amputation (2016) presents for suspected R great toe osteomyelitis

## 2022-08-09 NOTE — PROGRESS NOTE ADULT - ASSESSMENT
66 y/o M with right great toe wound  - Pt seen and evaluated  - Afebrile, no leukocytosis, ESR 81, CRP 0.32  - R foot XR: hallux distal phalanx OM, no tracking soft tissue air  - Right foot distal Hallux fibronecrotic wound to bone, tracking about 1cm proximally, dorsally. No pus, resolving erythema to IPJ, periwound edema, hyperkeratotic lesion noted to submet 1 and plantar arch.   - R foot wound culture pending  - Continue IV Vanc, zosyn  - Right foot MR to evaluate extent of OM pending  - Pod plan for R foot partial first ray resection pending MR  - Please document medical clearance for right foot surgery under light sedation and local anesthesia  - Discussed with attending

## 2022-08-09 NOTE — H&P ADULT - NSHPPHYSICALEXAM_GEN_ALL_CORE
GENERAL APPEARANCE: Well developed, well nourished, alert and cooperative. NAD.   HEENT:  PERRL, EOMI. External auditory canals normal, hearing grossly intact.  NECK: Neck supple, non-tender without lymphadenopathy, masses or thyromegaly.  CARDIAC: Normal S1 and S2. No S3, S4 or murmurs. Rhythm is regular.  LUNGS: Clear to auscultation and percussion without rales, rhonchi, wheezing or diminished breath sounds.  ABDOMEN: Positive bowel sounds. Soft, nondistended, nontender. No guarding or rebound.   MUSCULOSKELETAL: ROM intact spine and extremities. No joint erythema or tenderness.   BACK: normal gait and posture, no spinal deformity, symmetry of spinal muscles, without tenderness, decreased range of motion.  EXTREMITIES: Left foot first digit dressed, s/p amputation of left foot 5th digit. No significant deformity or joint abnormality. No edema. Peripheral pulses intact. No varicosities.  NEUROLOGICAL: CN II-XII intact. Strength and sensation symmetric and intact throughout.   SKIN: left toe wound   PSYCHIATRIC: AOx3.Normal affect and behavior.

## 2022-08-09 NOTE — PROGRESS NOTE ADULT - PROBLEM SELECTOR PLAN 1
Infected diabetic ulcer of great toe with x-ray concerning for OM  - MRI pending  - podiatry following   - c/w zosyn (8/9-)  - MRSA screen  - wound culture pending in lab   - podiatry following,  plan for R foot partial first ray resection pending MR  - RCRI score of 1, low risk for low risk procedure, f/u EKG Infected diabetic ulcer of great toe with x-ray concerning for OM, recent cx w/ MSSA, Proteus, Enterococcus all sensitive to zosyn   - MRI pending  - podiatry following   - s/p vanco, c/w zosyn (8/8-)  - MRSA screen  - wound culture pending in lab   - podiatry following,  plan for R foot partial first ray resection pending MR  - RCRI score of 1, low risk for low risk procedure, f/u EKG

## 2022-08-09 NOTE — PROGRESS NOTE ADULT - SUBJECTIVE AND OBJECTIVE BOX
Patient is a 67y old  Male who presents with a chief complaint of toe wound    SUBJECTIVE / OVERNIGHT EVENTS:    No CP, SOB, f/c/n/v  Reports foot is ok, was cleaned and dressing with podiatry this am    MEDICATIONS  (STANDING):  glucagon  Injectable 1 milliGRAM(s) IntraMuscular once  heparin   Injectable 5000 Unit(s) SubCutaneous every 8 hours  insulin detemir injectable (LEVEMIR) 15 Unit(s) SubCutaneous at bedtime  insulin lispro (ADMELOG) corrective regimen sliding scale   SubCutaneous three times a day before meals  insulin lispro (ADMELOG) corrective regimen sliding scale   SubCutaneous at bedtime  piperacillin/tazobactam IVPB.. 3.375 Gram(s) IV Intermittent every 8 hours    MEDICATIONS  (PRN):  acetaminophen     Tablet .. 650 milliGRAM(s) Oral every 6 hours PRN Temp greater or equal to 38C (100.4F), Mild Pain (1 - 3)  aluminum hydroxide/magnesium hydroxide/simethicone Suspension 30 milliLiter(s) Oral every 4 hours PRN Dyspepsia  dextrose Oral Gel 15 Gram(s) Oral once PRN Blood Glucose LESS THAN 70 milliGRAM(s)/deciliter  melatonin 3 milliGRAM(s) Oral at bedtime PRN Insomnia  ondansetron Injectable 4 milliGRAM(s) IV Push every 8 hours PRN Nausea and/or Vomiting    T(C): 36.7 (08-09-22 @ 14:27), Max: 36.8 (08-08-22 @ 20:59)  HR: 65 (08-09-22 @ 14:27) (63 - 75)  BP: 143/85 (08-09-22 @ 14:27) (121/63 - 157/92)  RR: 18 (08-09-22 @ 14:27) (16 - 20)  SpO2: 100% (08-09-22 @ 14:27) (100% - 100%)    CAPILLARY BLOOD GLUCOSE  POCT Blood Glucose.: 162 mg/dL (09 Aug 2022 11:12)  POCT Blood Glucose.: 129 mg/dL (09 Aug 2022 07:34)  POCT Blood Glucose.: 201 mg/dL (09 Aug 2022 03:15)  POCT Blood Glucose.: 160 mg/dL (08 Aug 2022 21:50)    PHYSICAL EXAM:  GENERAL: NAD, well-developed  CHEST/LUNG: Clear to auscultation bilaterally; No wheeze  HEART: Regular rate and rhythm; No murmurs, rubs, or gallops  ABDOMEN: Soft, Nontender, Nondistended; Bowel sounds present  EXTREMITIES:   warm and well perfused, No clubbing, cyanosis, or edema  PSYCH: AAOx3  NEUROLOGY: non-focal  SKIN: R foot wrapped w/ blood strike through     LABS:                        11.3   7.18  )-----------( 220      ( 08 Aug 2022 16:45 )             34.3     08-09    140  |  104  |  22  ----------------------------<  160<H>  4.2   |  26  |  1.09    Ca    9.1      09 Aug 2022 06:45  Phos  2.9     08-09  Mg     2.10     08-09    TPro  8.0  /  Alb  3.8  /  TBili  <0.2  /  DBili  x   /  AST  35  /  ALT  28  /  AlkPhos  106  08-08    Consultant(s) Notes Reviewed:  podiatry   Care Discussed with Consultants/Other Providers:

## 2022-08-09 NOTE — H&P ADULT - PROBLEM SELECTOR PLAN 2
-pt with baseline Cr 1.2-1.4  -today Cr noted to be 1.69. Possibly pre-renal  -check urine lytes. IVF challenge if Cr continues to uptrend  -encourage po intake  -renally dose meds

## 2022-08-09 NOTE — H&P ADULT - NSHPLABSRESULTS_GEN_ALL_CORE
(08-08 @ 16:45)                      11.3  7.18 )-----------( 220                 34.3    Neutrophils = 3.19 (44.4%)  Lymphocytes = 2.99 (41.6%)  Eosinophils = 0.38 (5.3%)  Basophils = 0.05 (0.7%)  Monocytes = 0.55 (7.7%)  Bands = --%    08-08    144  |  108<H>  |  30<H>  ----------------------------<  89  4.5   |  24  |  1.69<H>    Ca    9.6      08 Aug 2022 16:45  Mg     2.20     08-08    TPro  8.0  /  Alb  3.8  /  TBili  <0.2  /  DBili  x   /  AST  35  /  ALT  28  /  AlkPhos  106  08-08      < from: Xray Foot AP + Lateral + Oblique, Right (08.08.22 @ 15:41) >    IMPRESSION:  Imaging findings concerning for osteomyelitis of the distal phalanx of   the hallux.    < end of copied text >      Labs and imaging reviewed

## 2022-08-10 LAB
ANION GAP SERPL CALC-SCNC: 10 MMOL/L — SIGNIFICANT CHANGE UP (ref 7–14)
BUN SERPL-MCNC: 32 MG/DL — HIGH (ref 7–23)
CALCIUM SERPL-MCNC: 8.8 MG/DL — SIGNIFICANT CHANGE UP (ref 8.4–10.5)
CHLORIDE SERPL-SCNC: 107 MMOL/L — SIGNIFICANT CHANGE UP (ref 98–107)
CO2 SERPL-SCNC: 20 MMOL/L — LOW (ref 22–31)
CREAT SERPL-MCNC: 1.05 MG/DL — SIGNIFICANT CHANGE UP (ref 0.5–1.3)
EGFR: 79 ML/MIN/1.73M2 — SIGNIFICANT CHANGE UP
GLUCOSE BLDC GLUCOMTR-MCNC: 148 MG/DL — HIGH (ref 70–99)
GLUCOSE BLDC GLUCOMTR-MCNC: 172 MG/DL — HIGH (ref 70–99)
GLUCOSE BLDC GLUCOMTR-MCNC: 174 MG/DL — HIGH (ref 70–99)
GLUCOSE BLDC GLUCOMTR-MCNC: 229 MG/DL — HIGH (ref 70–99)
GLUCOSE SERPL-MCNC: 163 MG/DL — HIGH (ref 70–99)
HCT VFR BLD CALC: 34.1 % — LOW (ref 39–50)
HGB BLD-MCNC: 10.8 G/DL — LOW (ref 13–17)
MCHC RBC-ENTMCNC: 30.3 PG — SIGNIFICANT CHANGE UP (ref 27–34)
MCHC RBC-ENTMCNC: 31.7 GM/DL — LOW (ref 32–36)
MCV RBC AUTO: 95.5 FL — SIGNIFICANT CHANGE UP (ref 80–100)
NRBC # BLD: 0 /100 WBCS — SIGNIFICANT CHANGE UP
NRBC # FLD: 0 K/UL — SIGNIFICANT CHANGE UP
PLATELET # BLD AUTO: 209 K/UL — SIGNIFICANT CHANGE UP (ref 150–400)
POTASSIUM SERPL-MCNC: 4.2 MMOL/L — SIGNIFICANT CHANGE UP (ref 3.5–5.3)
POTASSIUM SERPL-SCNC: 4.2 MMOL/L — SIGNIFICANT CHANGE UP (ref 3.5–5.3)
RBC # BLD: 3.57 M/UL — LOW (ref 4.2–5.8)
RBC # FLD: 14 % — SIGNIFICANT CHANGE UP (ref 10.3–14.5)
SODIUM SERPL-SCNC: 137 MMOL/L — SIGNIFICANT CHANGE UP (ref 135–145)
WBC # BLD: 4.96 K/UL — SIGNIFICANT CHANGE UP (ref 3.8–10.5)
WBC # FLD AUTO: 4.96 K/UL — SIGNIFICANT CHANGE UP (ref 3.8–10.5)

## 2022-08-10 PROCEDURE — 73720 MRI LWR EXTREMITY W/O&W/DYE: CPT | Mod: 26,RT

## 2022-08-10 PROCEDURE — 99233 SBSQ HOSP IP/OBS HIGH 50: CPT

## 2022-08-10 PROCEDURE — 93010 ELECTROCARDIOGRAM REPORT: CPT

## 2022-08-10 RX ORDER — IBUPROFEN 200 MG
600 TABLET ORAL EVERY 8 HOURS
Refills: 0 | Status: DISCONTINUED | OUTPATIENT
Start: 2022-08-10 | End: 2022-08-15

## 2022-08-10 RX ORDER — ACETAMINOPHEN 500 MG
650 TABLET ORAL EVERY 6 HOURS
Refills: 0 | Status: DISCONTINUED | OUTPATIENT
Start: 2022-08-10 | End: 2022-08-15

## 2022-08-10 RX ADMIN — PIPERACILLIN AND TAZOBACTAM 25 GRAM(S): 4; .5 INJECTION, POWDER, LYOPHILIZED, FOR SOLUTION INTRAVENOUS at 21:09

## 2022-08-10 RX ADMIN — Medication 1: at 07:53

## 2022-08-10 RX ADMIN — Medication 10 UNIT(S): at 07:52

## 2022-08-10 RX ADMIN — PIPERACILLIN AND TAZOBACTAM 25 GRAM(S): 4; .5 INJECTION, POWDER, LYOPHILIZED, FOR SOLUTION INTRAVENOUS at 03:01

## 2022-08-10 RX ADMIN — Medication 15 UNIT(S): at 22:07

## 2022-08-10 RX ADMIN — ENOXAPARIN SODIUM 40 MILLIGRAM(S): 100 INJECTION SUBCUTANEOUS at 22:06

## 2022-08-10 RX ADMIN — Medication 1: at 17:27

## 2022-08-10 RX ADMIN — PIPERACILLIN AND TAZOBACTAM 25 GRAM(S): 4; .5 INJECTION, POWDER, LYOPHILIZED, FOR SOLUTION INTRAVENOUS at 13:19

## 2022-08-10 NOTE — CHART NOTE - NSCHARTNOTEFT_GEN_A_CORE
Nutrition Note:    Pt off the floor at the time of visit.  Will complete nutrition assessment when returns to the floor.    Dee Bella RDN #[id number]

## 2022-08-10 NOTE — PROGRESS NOTE ADULT - PROBLEM SELECTOR PLAN 5
[facility]  dispo pending possible podiatric procedure   RAVEN re: insurance lapse [facility]  dispo pending possible podiatric procedure   RAVEN re: insurance lapse for meds

## 2022-08-10 NOTE — PROGRESS NOTE ADULT - SUBJECTIVE AND OBJECTIVE BOX
Patient is a 67y old  Male who presents with a chief complaint of toe wound    SUBJECTIVE / OVERNIGHT EVENTS:    Some foot pain  No CP, SOB, f/c/n/v  Tolerating diet     MEDICATIONS  (STANDING):  acetaminophen Tablet 650 milliGRAM(s) Oral every 6 hours  enoxaparin Injectable 40 milliGRAM(s) SubCutaneous every 24 hours  insulin detemir injectable (LEVEMIR) 10 Unit(s) SubCutaneous before breakfast  insulin detemir injectable (LEVEMIR) 15 Unit(s) SubCutaneous at bedtime  insulin lispro (ADMELOG) corrective regimen sliding scale   SubCutaneous three times a day before meals  insulin lispro (ADMELOG) corrective regimen sliding scale   SubCutaneous at bedtime  piperacillin/tazobactam IVPB.. 3.375 Gram(s) IV Intermittent every 8 hours    MEDICATIONS  (PRN):  aluminum hydroxide/magnesium hydroxide/simethicone Suspension 30 milliLiter(s) Oral every 4 hours PRN Dyspepsia  dextrose Oral Gel 15 Gram(s) Oral once PRN Blood Glucose LESS THAN 70 milliGRAM(s)/deciliter  ibuprofen  Tablet. 600 milliGRAM(s) Oral every 8 hours PRN Moderate Pain (4 - 6), Severe Pain (7 - 10)  melatonin 3 milliGRAM(s) Oral at bedtime PRN Insomnia  ondansetron Injectable 4 milliGRAM(s) IV Push every 8 hours PRN Nausea and/or Vomiting    T(C): 36.7 (08-09-22 @ 17:53), Max: 36.7 (08-09-22 @ 14:27)  HR: 64 (08-09-22 @ 17:53) (64 - 65)  BP: 144/50 (08-09-22 @ 17:53) (143/85 - 144/50)  RR: 17 (08-09-22 @ 17:53) (17 - 18)  SpO2: 100% (08-09-22 @ 17:53) (100% - 100%)    CAPILLARY BLOOD GLUCOSE  POCT Blood Glucose.: 172 mg/dL (10 Aug 2022 07:39)  POCT Blood Glucose.: 150 mg/dL (09 Aug 2022 22:02)  POCT Blood Glucose.: 132 mg/dL (09 Aug 2022 17:34)    I&O's Summary    09 Aug 2022 07:01  -  10 Aug 2022 07:00  --------------------------------------------------------  IN: 240 mL / OUT: 200 mL / NET: 40 mL    PHYSICAL EXAM:  GENERAL: NAD, well-developed  CHEST/LUNG: Clear to auscultation bilaterally; No wheeze  HEART: Regular rate and rhythm; No murmurs, rubs, or gallops  ABDOMEN: Soft, Nontender, Nondistended; Bowel sounds present  EXTREMITIES:   warm and well perfused, No clubbing, cyanosis, or edema  PSYCH: AAOx3  NEUROLOGY: non-focal  SKIN: R foot wrapped     LABS:                        10.8   4.96  )-----------( 209      ( 10 Aug 2022 06:55 )             34.1     08-10    137  |  107  |  32<H>  ----------------------------<  163<H>  4.2   |  20<L>  |  1.05    Ca    8.8      10 Aug 2022 06:55  Phos  2.9     08-09  Mg     2.10     08-09    TPro  8.0  /  Alb  3.8  /  TBili  <0.2  /  DBili  x   /  AST  35  /  ALT  28  /  AlkPhos  106  08-08    Microbiology: Culture Results:   Moderate Staphylococcus aureus (08-08 @ 23:05)    Consultant(s) Notes Reviewed: podiatry   Care Discussed with Consultants/Other Providers:

## 2022-08-10 NOTE — PROGRESS NOTE ADULT - ASSESSMENT
66 y/o M with right great toe wound probe to bone  - Pt seen and evaluated  - Afebrile, no leukocytosis, ESR 81, CRP 0.32  - R foot XR: hallux distal phalanx OM, no tracking soft tissue air  - Right foot distal Hallux fibronecrotic wound to bone, tracking about 1cm proximally, dorsally. No pus, resolving erythema to IPJ, periwound edema, hyperkeratotic lesion noted to submet 1 and plantar arch.   - R foot wound culture: prelim result, Zulay SPRAGUE   - Pod plan for R foot partial first ray resection pending MRI  - Please document medical clearance for right foot surgery under light sedation and local anesthesia  - Discussed with attending   68 y/o M with right great toe wound probe to bone  - Pt seen and evaluated  - Afebrile, no leukocytosis, ESR 81, CRP 0.32  - R foot XR: hallux distal phalanx OM, no tracking soft tissue air  - Right foot distal Hallux fibronecrotic wound to bone, tracking about 1cm proximally, dorsally. No pus, resolving erythema to IPJ, periwound edema, hyperkeratotic lesion noted to submet 1 and plantar arch.   - R foot wound culture: prelim result, Zulay A.   - RF MRI: OM to the distal phalanx, and possible to head of proximal phalanx.   - Pod plan for R foot partial first ray resection on 8/12 Friday at 4pm.   - Medical optimization documented, appreciated.   - Discussed with attending

## 2022-08-10 NOTE — PROGRESS NOTE ADULT - PROBLEM SELECTOR PLAN 1
Infected diabetic ulcer of great toe with x-ray concerning for OM, recent cx w/ MSSA, Proteus, Enterococcus all sensitive to zosyn   - MRI pending, called MRI for today  - podiatry following   - s/p vanco, c/w zosyn (8/8-)  - MRSA screen pending  - wound culture pending in lab   - pain control  - podiatry following,  plan for R foot partial first ray resection pending MR  - RCRI score of 1, low risk for low risk procedure, EKG not in chart, reordered Infected diabetic ulcer of great toe with x-ray concerning for OM, recent cx w/ MSSA, Proteus, Enterococcus all sensitive to zosyn   - MRI pending, called MRI for today  - podiatry following   - s/p vanco, c/w zosyn (8/8-)  - MRSA screen pending  - wound culture: staph  - pain control  - podiatry following,  plan for R foot partial first ray resection pending MR  - RCRI score of 1, low risk for low risk procedure, EKG not in chart, reordered Infected diabetic ulcer of great toe with x-ray concerning for OM, recent cx w/ MSSA, Proteus, Enterococcus all sensitive to zosyn   - MRI pending, called MRI for today  - podiatry following   - s/p vanco, c/w zosyn (8/8-)  - MRSA screen pending  - wound culture: staph  - pain control  - podiatry following,  plan for R foot partial first ray resection pending MR  - RCRI score of 1, low risk for low risk procedure, EKG TWI in avr/avl, no CP/SOB/HF optimized for light sedation and local for amputation w/ podiatry Infected diabetic ulcer of great toe with x-ray concerning for OM, recent cx w/ MSSA, Proteus, Enterococcus all sensitive to zosyn   - MRI pending, called MRI for today  - podiatry following   - s/p vanco, c/w zosyn (8/8-)  - MRSA screen pending  - wound culture: staph  - pain control  - podiatry following,  plan for R foot partial first ray resection pending MR  - RCRI score of 1, low risk for low risk procedure, no CP/SOB/HF optimized for light sedation and local for amputation w/ podiatry

## 2022-08-10 NOTE — PROGRESS NOTE ADULT - PROBLEM SELECTOR PLAN 3
poorly controlled w/ Hb A1c 9.2%, oddly on levemir 15 units in AM, 20 in PM  - c/w 10 am and 15 units in pm  - c/w DM diet, BUD packer poorly controlled w/ Hb A1c 9.2%, oddly on Levemir 15 units in AM, 20 in PM  - c/w 10 am and 15 units in pm  - c/w DM diet, BUD packer

## 2022-08-10 NOTE — PROGRESS NOTE ADULT - SUBJECTIVE AND OBJECTIVE BOX
Patient is a 67y old  Male who presents with a chief complaint of toe wound (09 Aug 2022 10:51)       INTERVAL HPI/OVERNIGHT EVENTS:  Patient seen and evaluated at bedside.  Pt is resting comfortable in NAD. Denies N/V/F/C.    Allergies    bananas (Rash)  Cauliflower (Other)  No Known Drug Allergies    Intolerances        Vital Signs Last 24 Hrs  T(C): 36.7 (09 Aug 2022 17:53), Max: 36.7 (09 Aug 2022 14:27)  T(F): 98 (09 Aug 2022 17:53), Max: 98 (09 Aug 2022 14:27)  HR: 64 (09 Aug 2022 17:53) (64 - 65)  BP: 144/50 (09 Aug 2022 17:53) (139/83 - 144/50)  BP(mean): --  RR: 17 (09 Aug 2022 17:53) (17 - 20)  SpO2: 100% (09 Aug 2022 17:53) (100% - 100%)    Parameters below as of 09 Aug 2022 17:53  Patient On (Oxygen Delivery Method): room air        LABS:                        10.8   4.96  )-----------( 209      ( 10 Aug 2022 06:55 )             34.1     08-10    137  |  107  |  32<H>  ----------------------------<  163<H>  4.2   |  20<L>  |  1.05    Ca    8.8      10 Aug 2022 06:55  Phos  2.9     08-09  Mg     2.10     08-09    TPro  8.0  /  Alb  3.8  /  TBili  <0.2  /  DBili  x   /  AST  35  /  ALT  28  /  AlkPhos  106  08-08        CAPILLARY BLOOD GLUCOSE      POCT Blood Glucose.: 172 mg/dL (10 Aug 2022 07:39)  POCT Blood Glucose.: 150 mg/dL (09 Aug 2022 22:02)  POCT Blood Glucose.: 132 mg/dL (09 Aug 2022 17:34)  POCT Blood Glucose.: 162 mg/dL (09 Aug 2022 11:12)      Lower Extremity Physical Exam:      Vasular: DP/PT 2/4, B/L, CFT < 3 seconds B/L, Temperature gradient warm to cool, B/L.   Neuro: Epicritic sensation absent to the level of the digits, B/L.  Musculoskeletal/Ortho: s/p L foot partial fifth ray resection, healed  Skin: Right foot distal Hallux fibronecrotic wound to bone, tracking about 1cm proximally, dorsally. No pus, resolving erythema to IPJ, periwound edema, hperkeratotic lesion noted to submet 1 and plantar arch.     RADIOLOGY & ADDITIONAL TESTS:   Patient is a 67y old  Male who presents with a chief complaint of toe wound (09 Aug 2022 10:51)       INTERVAL HPI/OVERNIGHT EVENTS:  Patient seen and evaluated at bedside.  Pt is resting comfortable in NAD. Denies N/V/F/C.    Allergies    bananas (Rash)  Cauliflower (Other)  No Known Drug Allergies    Intolerances        Vital Signs Last 24 Hrs  T(C): 36.7 (09 Aug 2022 17:53), Max: 36.7 (09 Aug 2022 14:27)  T(F): 98 (09 Aug 2022 17:53), Max: 98 (09 Aug 2022 14:27)  HR: 64 (09 Aug 2022 17:53) (64 - 65)  BP: 144/50 (09 Aug 2022 17:53) (139/83 - 144/50)  BP(mean): --  RR: 17 (09 Aug 2022 17:53) (17 - 20)  SpO2: 100% (09 Aug 2022 17:53) (100% - 100%)    Parameters below as of 09 Aug 2022 17:53  Patient On (Oxygen Delivery Method): room air        LABS:                        10.8   4.96  )-----------( 209      ( 10 Aug 2022 06:55 )             34.1     08-10    137  |  107  |  32<H>  ----------------------------<  163<H>  4.2   |  20<L>  |  1.05    Ca    8.8      10 Aug 2022 06:55  Phos  2.9     08-09  Mg     2.10     08-09    TPro  8.0  /  Alb  3.8  /  TBili  <0.2  /  DBili  x   /  AST  35  /  ALT  28  /  AlkPhos  106  08-08        CAPILLARY BLOOD GLUCOSE      POCT Blood Glucose.: 172 mg/dL (10 Aug 2022 07:39)  POCT Blood Glucose.: 150 mg/dL (09 Aug 2022 22:02)  POCT Blood Glucose.: 132 mg/dL (09 Aug 2022 17:34)  POCT Blood Glucose.: 162 mg/dL (09 Aug 2022 11:12)      Lower Extremity Physical Exam:      Vasular: DP/PT 2/4, B/L, CFT < 3 seconds B/L, Temperature gradient warm to cool, B/L.   Neuro: Epicritic sensation absent to the level of the digits, B/L.  Musculoskeletal/Ortho: s/p L foot partial fifth ray resection, healed  Skin: Right foot distal Hallux fibronecrotic wound to bone, tracking about 1cm proximally, dorsally. No pus, resolving erythema to IPJ, periwound edema, hperkeratotic lesion noted to submet 1 and plantar arch.     RADIOLOGY & ADDITIONAL TESTS:      ACC: [id number] EXAM:  MR FOOT WAW IC RT                          PROCEDURE DATE:  08/10/2022          INTERPRETATION:  RIGHT FOOT MRI    CLINICAL INFORMATION: Foot pain and swelling in diabetic patient. Distal   right hallux wound extending to the level of the bone. Evaluate for   osteomyelitis.  TECHNIQUE: Multiplanar, multisequence MRI was obtained of the RIGHT foot   before and after the intravenous administration of 8 ml Gadavist (2 cc   discarded) .  COMPARISON: Right foot radiographs 8/8/2022.    FINDINGS:    PERIPHERAL SOFT TISSUES: Soft tissue wound in the dorsal aspect of the   hallux. Wound tracks to the cortex of the distal phalanx of the hallux.   There is no associated rim-enhancing fluid collection to suggest abscess.   Diffuseedema is seen in the subcutaneous fat of the hallux.  BONE MARROW: Increased STIR signal with loss of T1 hyperintense signal   and fragmentation of the distal phalanx of hallux consistent with acute   osteomyelitis. Mild increased STIR signal in the head of the proximal   phalanx of the hallux without loss of T1 hyperintense signal. Marrow   signal is otherwise maintained.    MUSCLES AND TENDONS: Tendons are intact. Diffuse muscle atrophy of the   intrinsic musculature of the foot.  LIGAMENTS ANDCAPSULAR STRUCTURES: Lisfranc ligament is intact.  CARTILAGE AND SUBCHONDRAL BONE: No focal chondral loss.  SYNOVIUM/JOINT FLUID: No large joint effusion.      IMPRESSION:  1.  Soft tissue wound in the hallux with associated acute osteomyelitis   of the distal phalanx of the hallux.  2.  Mild reactive edema in the head of the proximal phalanx of the hallux   without MR evidence of acute osteomyelitis at this time.    --- End of Report ---            RENUKA KEARNEY MD; Attending Radiologist  This document has been electronically signed. Aug 10 2022 12:58PM

## 2022-08-11 ENCOUNTER — TRANSCRIPTION ENCOUNTER (OUTPATIENT)
Age: 67
End: 2022-08-11

## 2022-08-11 LAB
-  AMPICILLIN/SULBACTAM: SIGNIFICANT CHANGE UP
-  CEFAZOLIN: SIGNIFICANT CHANGE UP
-  CLINDAMYCIN: SIGNIFICANT CHANGE UP
-  ERYTHROMYCIN: SIGNIFICANT CHANGE UP
-  GENTAMICIN: SIGNIFICANT CHANGE UP
-  OXACILLIN: SIGNIFICANT CHANGE UP
-  PENICILLIN: SIGNIFICANT CHANGE UP
-  RIFAMPIN: SIGNIFICANT CHANGE UP
-  TETRACYCLINE: SIGNIFICANT CHANGE UP
-  TRIMETHOPRIM/SULFAMETHOXAZOLE: SIGNIFICANT CHANGE UP
-  VANCOMYCIN: SIGNIFICANT CHANGE UP
ANION GAP SERPL CALC-SCNC: 10 MMOL/L — SIGNIFICANT CHANGE UP (ref 7–14)
BUN SERPL-MCNC: 29 MG/DL — HIGH (ref 7–23)
CALCIUM SERPL-MCNC: 9.3 MG/DL — SIGNIFICANT CHANGE UP (ref 8.4–10.5)
CHLORIDE SERPL-SCNC: 109 MMOL/L — HIGH (ref 98–107)
CO2 SERPL-SCNC: 20 MMOL/L — LOW (ref 22–31)
CREAT SERPL-MCNC: 1.01 MG/DL — SIGNIFICANT CHANGE UP (ref 0.5–1.3)
EGFR: 83 ML/MIN/1.73M2 — SIGNIFICANT CHANGE UP
GLUCOSE BLDC GLUCOMTR-MCNC: 176 MG/DL — HIGH (ref 70–99)
GLUCOSE BLDC GLUCOMTR-MCNC: 237 MG/DL — HIGH (ref 70–99)
GLUCOSE BLDC GLUCOMTR-MCNC: 247 MG/DL — HIGH (ref 70–99)
GLUCOSE BLDC GLUCOMTR-MCNC: 254 MG/DL — HIGH (ref 70–99)
GLUCOSE SERPL-MCNC: 168 MG/DL — HIGH (ref 70–99)
HCT VFR BLD CALC: 35.5 % — LOW (ref 39–50)
HGB BLD-MCNC: 11.6 G/DL — LOW (ref 13–17)
MAGNESIUM SERPL-MCNC: 2.1 MG/DL — SIGNIFICANT CHANGE UP (ref 1.6–2.6)
MCHC RBC-ENTMCNC: 30.8 PG — SIGNIFICANT CHANGE UP (ref 27–34)
MCHC RBC-ENTMCNC: 32.7 GM/DL — SIGNIFICANT CHANGE UP (ref 32–36)
MCV RBC AUTO: 94.2 FL — SIGNIFICANT CHANGE UP (ref 80–100)
METHOD TYPE: SIGNIFICANT CHANGE UP
MRSA PCR RESULT.: SIGNIFICANT CHANGE UP
NRBC # BLD: 0 /100 WBCS — SIGNIFICANT CHANGE UP
NRBC # FLD: 0 K/UL — SIGNIFICANT CHANGE UP
PHOSPHATE SERPL-MCNC: 3.7 MG/DL — SIGNIFICANT CHANGE UP (ref 2.5–4.5)
PLATELET # BLD AUTO: 214 K/UL — SIGNIFICANT CHANGE UP (ref 150–400)
POTASSIUM SERPL-MCNC: 4.4 MMOL/L — SIGNIFICANT CHANGE UP (ref 3.5–5.3)
POTASSIUM SERPL-SCNC: 4.4 MMOL/L — SIGNIFICANT CHANGE UP (ref 3.5–5.3)
RBC # BLD: 3.77 M/UL — LOW (ref 4.2–5.8)
RBC # FLD: 14.2 % — SIGNIFICANT CHANGE UP (ref 10.3–14.5)
S AUREUS DNA NOSE QL NAA+PROBE: SIGNIFICANT CHANGE UP
SARS-COV-2 RNA SPEC QL NAA+PROBE: SIGNIFICANT CHANGE UP
SODIUM SERPL-SCNC: 139 MMOL/L — SIGNIFICANT CHANGE UP (ref 135–145)
WBC # BLD: 4.99 K/UL — SIGNIFICANT CHANGE UP (ref 3.8–10.5)
WBC # FLD AUTO: 4.99 K/UL — SIGNIFICANT CHANGE UP (ref 3.8–10.5)

## 2022-08-11 PROCEDURE — 71045 X-RAY EXAM CHEST 1 VIEW: CPT | Mod: 26

## 2022-08-11 PROCEDURE — 99232 SBSQ HOSP IP/OBS MODERATE 35: CPT

## 2022-08-11 RX ORDER — OXYCODONE AND ACETAMINOPHEN 5; 325 MG/1; MG/1
1 TABLET ORAL EVERY 6 HOURS
Refills: 0 | Status: DISCONTINUED | OUTPATIENT
Start: 2022-08-11 | End: 2022-08-11

## 2022-08-11 RX ORDER — INSULIN DETEMIR 100/ML (3)
5 INSULIN PEN (ML) SUBCUTANEOUS
Refills: 0 | Status: DISCONTINUED | OUTPATIENT
Start: 2022-08-12 | End: 2022-08-12

## 2022-08-11 RX ORDER — INSULIN LISPRO 100/ML
2 VIAL (ML) SUBCUTANEOUS
Refills: 0 | Status: DISCONTINUED | OUTPATIENT
Start: 2022-08-11 | End: 2022-08-15

## 2022-08-11 RX ORDER — SODIUM CHLORIDE 9 MG/ML
1000 INJECTION, SOLUTION INTRAVENOUS
Refills: 0 | Status: DISCONTINUED | OUTPATIENT
Start: 2022-08-11 | End: 2022-08-13

## 2022-08-11 RX ORDER — OXYCODONE AND ACETAMINOPHEN 5; 325 MG/1; MG/1
1 TABLET ORAL ONCE
Refills: 0 | Status: DISCONTINUED | OUTPATIENT
Start: 2022-08-11 | End: 2022-08-11

## 2022-08-11 RX ADMIN — Medication 15 UNIT(S): at 22:52

## 2022-08-11 RX ADMIN — PIPERACILLIN AND TAZOBACTAM 25 GRAM(S): 4; .5 INJECTION, POWDER, LYOPHILIZED, FOR SOLUTION INTRAVENOUS at 21:07

## 2022-08-11 RX ADMIN — OXYCODONE AND ACETAMINOPHEN 1 TABLET(S): 5; 325 TABLET ORAL at 13:33

## 2022-08-11 RX ADMIN — PIPERACILLIN AND TAZOBACTAM 25 GRAM(S): 4; .5 INJECTION, POWDER, LYOPHILIZED, FOR SOLUTION INTRAVENOUS at 05:06

## 2022-08-11 RX ADMIN — Medication 2 UNIT(S): at 17:28

## 2022-08-11 RX ADMIN — Medication 2: at 12:12

## 2022-08-11 RX ADMIN — OXYCODONE AND ACETAMINOPHEN 1 TABLET(S): 5; 325 TABLET ORAL at 12:11

## 2022-08-11 RX ADMIN — Medication 2: at 17:27

## 2022-08-11 RX ADMIN — OXYCODONE AND ACETAMINOPHEN 1 TABLET(S): 5; 325 TABLET ORAL at 22:07

## 2022-08-11 RX ADMIN — OXYCODONE AND ACETAMINOPHEN 1 TABLET(S): 5; 325 TABLET ORAL at 21:07

## 2022-08-11 RX ADMIN — Medication 1: at 08:04

## 2022-08-11 RX ADMIN — PIPERACILLIN AND TAZOBACTAM 25 GRAM(S): 4; .5 INJECTION, POWDER, LYOPHILIZED, FOR SOLUTION INTRAVENOUS at 12:53

## 2022-08-11 RX ADMIN — Medication 1: at 22:53

## 2022-08-11 RX ADMIN — Medication 10 UNIT(S): at 08:04

## 2022-08-11 NOTE — DIETITIAN INITIAL EVALUATION ADULT - PERTINENT LABORATORY DATA
08-11    139  |  109<H>  |  29<H>  ----------------------------<  168<H>  4.4   |  20<L>  |  1.01    Ca    9.3      11 Aug 2022 06:45  Phos  3.7     08-11  Mg     2.10     08-11    POCT Blood Glucose.: 237 mg/dL (08-11-22 @ 11:50)  A1C with Estimated Average Glucose Result: 9.2 % (08-09-22 @ 06:45)

## 2022-08-11 NOTE — DIETITIAN INITIAL EVALUATION ADULT - NS FNS DIET ORDER
Diet, NPO after Midnight:      NPO Start Date: 11-Aug-2022,   NPO Start Time: 23:59 (08-11-22 @ 12:15)  Diet, NPO after Midnight:      NPO Start Date: 11-Aug-2022,   NPO Start Time: 23:59 (08-11-22 @ 11:50)

## 2022-08-11 NOTE — PROGRESS NOTE ADULT - SUBJECTIVE AND OBJECTIVE BOX
Patient is a 67y old  Male who presents with a chief complaint of toe wound (10 Aug 2022 09:04)       INTERVAL HPI/OVERNIGHT EVENTS:  Patient seen and evaluated at bedside.  Pt is resting comfortable in NAD. Denies N/V/F/C.    Allergies    bananas (Rash)  Cauliflower (Other)  No Known Drug Allergies    Intolerances        Vital Signs Last 24 Hrs  T(C): 36.6 (11 Aug 2022 05:11), Max: 37.1 (10 Aug 2022 13:27)  T(F): 97.8 (11 Aug 2022 05:11), Max: 98.8 (10 Aug 2022 13:27)  HR: 61 (11 Aug 2022 05:11) (61 - 69)  BP: 146/79 (11 Aug 2022 05:11) (146/79 - 159/80)  BP(mean): --  RR: 18 (11 Aug 2022 05:11) (17 - 18)  SpO2: 100% (11 Aug 2022 05:11) (100% - 100%)    Parameters below as of 11 Aug 2022 05:11  Patient On (Oxygen Delivery Method): room air        LABS:                        11.6   4.99  )-----------( 214      ( 11 Aug 2022 06:45 )             35.5     08-11    139  |  109<H>  |  29<H>  ----------------------------<  168<H>  4.4   |  20<L>  |  1.01    Ca    9.3      11 Aug 2022 06:45  Phos  3.7     08-11  Mg     2.10     08-11          CAPILLARY BLOOD GLUCOSE      POCT Blood Glucose.: 237 mg/dL (11 Aug 2022 11:50)  POCT Blood Glucose.: 176 mg/dL (11 Aug 2022 07:36)  POCT Blood Glucose.: 229 mg/dL (10 Aug 2022 21:34)  POCT Blood Glucose.: 174 mg/dL (10 Aug 2022 16:39)  POCT Blood Glucose.: 148 mg/dL (10 Aug 2022 12:42)      Lower Extremity Physical Exam:    Vasular: DP/PT 2/4, B/L, CFT < 3 seconds B/L, Temperature gradient warm to cool, B/L.   Neuro: Epicritic sensation absent to the level of the digits, B/L.  Musculoskeletal/Ortho: s/p L foot partial fifth ray resection, healed  Skin: Right foot distal Hallux fibronecrotic wound to bone, tracking about 1cm proximally, dorsally. No pus, resolving erythema to IPJ, periwound edema, hperkeratotic lesion noted to submet 1 and plantar arch.       RADIOLOGY & ADDITIONAL TESTS:

## 2022-08-11 NOTE — PROGRESS NOTE ADULT - PROBLEM SELECTOR PLAN 1
Infected diabetic ulcer of great toe with x-ray concerning for OM, recent cx w/ MSSA, Proteus, Enterococcus all sensitive to zosyn   - MRI 8/10, confirms OM  - podiatry following   - s/p vanco, c/w zosyn (8/8-)  - MRSA screen pending  - wound culture: staph, f/u sensitives   - pain control  - podiatry following,  plan for R foot partial first ray resection on 8/12  - RCRI score of 1, low risk for low risk procedure, no CP/SOB/HF optimized for light sedation and local for amputation w/ podiatry on 8/12

## 2022-08-11 NOTE — DIETITIAN INITIAL EVALUATION ADULT - ORAL INTAKE PTA/DIET HISTORY
Pt seen and reprots good appetite at home but reports wt. loss due to always very active at work around the house. Pt non- compliant to CHO consistent diet and explained to him that un controlled DM coud be cause of wt. loss as well. Pt willing to follow diet now.  Pt seen and reports good appetite at home but reports wt. loss due to always very active at work around the house. Pt non- compliant to CHO consistent diet and explained to him that likely uncontrolled DM resulted in wt. loss. Pt willing to follow diet now.

## 2022-08-11 NOTE — DIETITIAN NUTRITION RISK NOTIFICATION - TREATMENT: THE FOLLOWING DIET HAS BEEN RECOMMENDED
Diet, NPO after Midnight:      NPO Start Date: 11-Aug-2022,   NPO Start Time: 23:59 (08-11-22 @ 12:15) [Active]  Diet, NPO after Midnight:      NPO Start Date: 11-Aug-2022,   NPO Start Time: 23:59 (08-11-22 @ 11:50) [Active]  Diet, Regular:   Consistent Carbohydrate {Evening Snack} (CSTCHOSN) (08-09-22 @ 00:47) [Active]

## 2022-08-11 NOTE — PROGRESS NOTE ADULT - ASSESSMENT
68 y/o M with right great toe wound probe to bone  - Pt seen and evaluated  - Afebrile, no leukocytosis.  - R foot XR: hallux distal phalanx OM, no tracking soft tissue air  - Right foot distal Hallux fibronecrotic wound to bone, tracking about 1cm proximally, dorsally. No pus, resolving erythema to IPJ, periwound edema, hyperkeratotic lesion noted to submet 1 and plantar arch.   - R foot wound culture: prelim result, Staph A.   - RF MRI: OM to the distal phalanx, and possible to head of proximal phalanx.   - Pod plan for R foot partial first ray resection on 8/12 Friday at 4pm.   - Medical optimization documented, appreciated.   - Recommend holding Today's dose of Lovenox if no other contraindication.   - Preop labs ordered.   - Discussed with attending

## 2022-08-11 NOTE — PROGRESS NOTE ADULT - PROBLEM SELECTOR PLAN 3
poorly controlled w/ Hb A1c 9.2%, oddly on Levemir 15 units in AM, 20 in PM  - c/w 10 am and 15 units in pm  - c/w DM diet, BUD packer poorly controlled w/ Hb A1c 9.2%, oddly on Levemir 15 units in AM, 20 in PM  - c/w 10 am and 15 units in pm--> for OR 5/15 units  - add 2 units w/ meals   - c/w DM diet, BUD  - ANGELA consult

## 2022-08-11 NOTE — DIETITIAN INITIAL EVALUATION ADULT - PERTINENT MEDS FT
MEDICATIONS  (STANDING):  acetaminophen     Tablet .. 650 milliGRAM(s) Oral every 6 hours  dextrose 50% Injectable 25 Gram(s) IV Push once  dextrose 50% Injectable 12.5 Gram(s) IV Push once  dextrose 50% Injectable 25 Gram(s) IV Push once  enoxaparin Injectable 40 milliGRAM(s) SubCutaneous every 24 hours  insulin detemir injectable (LEVEMIR) 15 Unit(s) SubCutaneous at bedtime  insulin lispro (ADMELOG) corrective regimen sliding scale   SubCutaneous three times a day before meals  insulin lispro (ADMELOG) corrective regimen sliding scale   SubCutaneous at bedtime  insulin lispro Injectable (ADMELOG) 2 Unit(s) SubCutaneous three times a day before meals  piperacillin/tazobactam IVPB.. 3.375 Gram(s) IV Intermittent every 8 hours    MEDICATIONS  (PRN):  aluminum hydroxide/magnesium hydroxide/simethicone Suspension 30 milliLiter(s) Oral every 4 hours PRN Dyspepsia  dextrose Oral Gel 15 Gram(s) Oral once PRN Blood Glucose LESS THAN 70 milliGRAM(s)/deciliter  ibuprofen  Tablet. 600 milliGRAM(s) Oral every 8 hours PRN Moderate Pain (4 - 6), Severe Pain (7 - 10)  melatonin 3 milliGRAM(s) Oral at bedtime PRN Insomnia  ondansetron Injectable 4 milliGRAM(s) IV Push every 8 hours PRN Nausea and/or Vomiting  oxycodone    5 mG/acetaminophen 325 mG 1 Tablet(s) Oral every 6 hours PRN Severe Pain (7 - 10)

## 2022-08-11 NOTE — DIETITIAN INITIAL EVALUATION ADULT - OTHER INFO
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 Pt will be NPO tonight after MN for procedure tomorrow. Pt reports wt. loss of 30#/15% in past 3 months. Current wt- 127.8# (8/8/22)seems inaccurate as observed pt.  Pt stated his wt-165# 2 weeks ago per pt, UBW- 196# 3 months ago. Labs reviewed. A1c- 9.2% (8/9/22) shows uncontrolled DM. Noted skin with Rt foot wound but no edema per nursing flow sheet. Rec supplement Glucerna  3x daily (660kcals, 30g protein) to promote gradual wt. gain.  RD provided the patient with extensive verbal and written DM diet education; including, carb counting, label reading, meal planning, pre-prandial and post-prandial finger stick goals, and HbA1c goal. Patient was also made aware of the physiological implications of poor glycemic control. Also discussed Heart Healthy diet recommendations. Importance of having consistent carbohydrate with protein at each meals emphasized.

## 2022-08-11 NOTE — PROGRESS NOTE ADULT - SUBJECTIVE AND OBJECTIVE BOX
Patient is a 67y old  Male who presents with a chief complaint of toe wound    SUBJECTIVE / OVERNIGHT EVENTS:    Doing well  no CP, SOB, f/c/n/v    MEDICATIONS  (STANDING):  acetaminophen     Tablet .. 650 milliGRAM(s) Oral every 6 hours  enoxaparin Injectable 40 milliGRAM(s) SubCutaneous every 24 hours  insulin detemir injectable (LEVEMIR) 10 Unit(s) SubCutaneous before breakfast  insulin detemir injectable (LEVEMIR) 15 Unit(s) SubCutaneous at bedtime  insulin lispro (ADMELOG) corrective regimen sliding scale   SubCutaneous three times a day before meals  insulin lispro (ADMELOG) corrective regimen sliding scale   SubCutaneous at bedtime  piperacillin/tazobactam IVPB.. 3.375 Gram(s) IV Intermittent every 8 hours    MEDICATIONS  (PRN):  aluminum hydroxide/magnesium hydroxide/simethicone Suspension 30 milliLiter(s) Oral every 4 hours PRN Dyspepsia  dextrose Oral Gel 15 Gram(s) Oral once PRN Blood Glucose LESS THAN 70 milliGRAM(s)/deciliter  ibuprofen  Tablet. 600 milliGRAM(s) Oral every 8 hours PRN Moderate Pain (4 - 6), Severe Pain (7 - 10)  melatonin 3 milliGRAM(s) Oral at bedtime PRN Insomnia  ondansetron Injectable 4 milliGRAM(s) IV Push every 8 hours PRN Nausea and/or Vomiting  oxycodone    5 mG/acetaminophen 325 mG 1 Tablet(s) Oral every 6 hours PRN Severe Pain (7 - 10)    T(C): 36.6 (08-11-22 @ 05:11), Max: 37.1 (08-10-22 @ 13:27)  HR: 61 (08-11-22 @ 05:11) (61 - 69)  BP: 146/79 (08-11-22 @ 05:11) (146/79 - 159/80)  RR: 18 (08-11-22 @ 05:11) (17 - 18)  SpO2: 100% (08-11-22 @ 05:11) (100% - 100%)    CAPILLARY BLOOD GLUCOSE  POCT Blood Glucose.: 176 mg/dL (11 Aug 2022 07:36)  POCT Blood Glucose.: 229 mg/dL (10 Aug 2022 21:34)  POCT Blood Glucose.: 174 mg/dL (10 Aug 2022 16:39)  POCT Blood Glucose.: 148 mg/dL (10 Aug 2022 12:42)    PHYSICAL EXAM:  GENERAL: NAD, well-developed  CHEST/LUNG: Clear to auscultation bilaterally; No wheeze  HEART: Regular rate and rhythm; No murmurs, rubs, or gallops  ABDOMEN: Soft, Nontender, Nondistended; Bowel sounds present  EXTREMITIES:   warm and well perfused, No clubbing, cyanosis, or edema  PSYCH: AAOx3  NEUROLOGY: non-focal  SKIN: R foot wrapped     LABS:                        11.6   4.99  )-----------( 214      ( 11 Aug 2022 06:45 )             35.5     08-11    139  |  109<H>  |  29<H>  ----------------------------<  168<H>  4.4   |  20<L>  |  1.01    Ca    9.3      11 Aug 2022 06:45  Phos  3.7     08-11  Mg     2.10     08-11    Microbiology: Culture Results:   Moderate Staphylococcus aureus (08-08 @ 23:05)    Consultant(s) Notes Reviewed:    Care Discussed with Consultants/Other Providers:   Billing Type: Third-Party Bill

## 2022-08-12 ENCOUNTER — RESULT REVIEW (OUTPATIENT)
Age: 67
End: 2022-08-12

## 2022-08-12 ENCOUNTER — TRANSCRIPTION ENCOUNTER (OUTPATIENT)
Age: 67
End: 2022-08-12

## 2022-08-12 LAB
ANION GAP SERPL CALC-SCNC: 9 MMOL/L — SIGNIFICANT CHANGE UP (ref 7–14)
APTT BLD: 29.9 SEC — SIGNIFICANT CHANGE UP (ref 27–36.3)
BASOPHILS # BLD AUTO: 0.03 K/UL — SIGNIFICANT CHANGE UP (ref 0–0.2)
BASOPHILS NFR BLD AUTO: 0.5 % — SIGNIFICANT CHANGE UP (ref 0–2)
BLD GP AB SCN SERPL QL: POSITIVE — SIGNIFICANT CHANGE UP
BUN SERPL-MCNC: 27 MG/DL — HIGH (ref 7–23)
CALCIUM SERPL-MCNC: 9.4 MG/DL — SIGNIFICANT CHANGE UP (ref 8.4–10.5)
CHLORIDE SERPL-SCNC: 104 MMOL/L — SIGNIFICANT CHANGE UP (ref 98–107)
CO2 SERPL-SCNC: 22 MMOL/L — SIGNIFICANT CHANGE UP (ref 22–31)
CREAT SERPL-MCNC: 0.89 MG/DL — SIGNIFICANT CHANGE UP (ref 0.5–1.3)
EGFR: 95 ML/MIN/1.73M2 — SIGNIFICANT CHANGE UP
EOSINOPHIL # BLD AUTO: 0.2 K/UL — SIGNIFICANT CHANGE UP (ref 0–0.5)
EOSINOPHIL NFR BLD AUTO: 3.6 % — SIGNIFICANT CHANGE UP (ref 0–6)
GLUCOSE BLDC GLUCOMTR-MCNC: 100 MG/DL — HIGH (ref 70–99)
GLUCOSE BLDC GLUCOMTR-MCNC: 120 MG/DL — HIGH (ref 70–99)
GLUCOSE BLDC GLUCOMTR-MCNC: 161 MG/DL — HIGH (ref 70–99)
GLUCOSE BLDC GLUCOMTR-MCNC: 163 MG/DL — HIGH (ref 70–99)
GLUCOSE SERPL-MCNC: 159 MG/DL — HIGH (ref 70–99)
HCT VFR BLD CALC: 34.2 % — LOW (ref 39–50)
HGB BLD-MCNC: 11.1 G/DL — LOW (ref 13–17)
IANC: 2.4 K/UL — SIGNIFICANT CHANGE UP (ref 1.8–7.4)
IMM GRANULOCYTES NFR BLD AUTO: 0.4 % — SIGNIFICANT CHANGE UP (ref 0–1.5)
INR BLD: 0.92 RATIO — SIGNIFICANT CHANGE UP (ref 0.88–1.16)
LYMPHOCYTES # BLD AUTO: 2.25 K/UL — SIGNIFICANT CHANGE UP (ref 1–3.3)
LYMPHOCYTES # BLD AUTO: 40.8 % — SIGNIFICANT CHANGE UP (ref 13–44)
MCHC RBC-ENTMCNC: 31.4 PG — SIGNIFICANT CHANGE UP (ref 27–34)
MCHC RBC-ENTMCNC: 32.5 GM/DL — SIGNIFICANT CHANGE UP (ref 32–36)
MCV RBC AUTO: 96.6 FL — SIGNIFICANT CHANGE UP (ref 80–100)
MONOCYTES # BLD AUTO: 0.61 K/UL — SIGNIFICANT CHANGE UP (ref 0–0.9)
MONOCYTES NFR BLD AUTO: 11.1 % — SIGNIFICANT CHANGE UP (ref 2–14)
NEUTROPHILS # BLD AUTO: 2.4 K/UL — SIGNIFICANT CHANGE UP (ref 1.8–7.4)
NEUTROPHILS NFR BLD AUTO: 43.6 % — SIGNIFICANT CHANGE UP (ref 43–77)
NRBC # BLD: 0 /100 WBCS — SIGNIFICANT CHANGE UP
NRBC # FLD: 0 K/UL — SIGNIFICANT CHANGE UP
PLATELET # BLD AUTO: 215 K/UL — SIGNIFICANT CHANGE UP (ref 150–400)
POTASSIUM SERPL-MCNC: 4.3 MMOL/L — SIGNIFICANT CHANGE UP (ref 3.5–5.3)
POTASSIUM SERPL-SCNC: 4.3 MMOL/L — SIGNIFICANT CHANGE UP (ref 3.5–5.3)
PROTHROM AB SERPL-ACNC: 10.7 SEC — SIGNIFICANT CHANGE UP (ref 10.5–13.4)
RBC # BLD: 3.54 M/UL — LOW (ref 4.2–5.8)
RBC # FLD: 14.4 % — SIGNIFICANT CHANGE UP (ref 10.3–14.5)
RH IG SCN BLD-IMP: POSITIVE — SIGNIFICANT CHANGE UP
SODIUM SERPL-SCNC: 135 MMOL/L — SIGNIFICANT CHANGE UP (ref 135–145)
WBC # BLD: 5.51 K/UL — SIGNIFICANT CHANGE UP (ref 3.8–10.5)
WBC # FLD AUTO: 5.51 K/UL — SIGNIFICANT CHANGE UP (ref 3.8–10.5)

## 2022-08-12 PROCEDURE — 86077 PHYS BLOOD BANK SERV XMATCH: CPT

## 2022-08-12 PROCEDURE — 88305 TISSUE EXAM BY PATHOLOGIST: CPT | Mod: 26

## 2022-08-12 PROCEDURE — 99232 SBSQ HOSP IP/OBS MODERATE 35: CPT

## 2022-08-12 PROCEDURE — 73630 X-RAY EXAM OF FOOT: CPT | Mod: 26,RT

## 2022-08-12 PROCEDURE — 88311 DECALCIFY TISSUE: CPT | Mod: 26

## 2022-08-12 RX ORDER — HYDRALAZINE HCL 50 MG
10 TABLET ORAL ONCE
Refills: 0 | Status: COMPLETED | OUTPATIENT
Start: 2022-08-12 | End: 2022-08-12

## 2022-08-12 RX ORDER — INSULIN DETEMIR 100/ML (3)
5 INSULIN PEN (ML) SUBCUTANEOUS
Refills: 0 | Status: DISCONTINUED | OUTPATIENT
Start: 2022-08-12 | End: 2022-08-12

## 2022-08-12 RX ORDER — FENTANYL CITRATE 50 UG/ML
25 INJECTION INTRAVENOUS
Refills: 0 | Status: DISCONTINUED | OUTPATIENT
Start: 2022-08-12 | End: 2022-08-12

## 2022-08-12 RX ORDER — INSULIN DETEMIR 100/ML (3)
10 INSULIN PEN (ML) SUBCUTANEOUS
Refills: 0 | Status: DISCONTINUED | OUTPATIENT
Start: 2022-08-13 | End: 2022-08-15

## 2022-08-12 RX ORDER — ACETAMINOPHEN 500 MG
650 TABLET ORAL EVERY 6 HOURS
Refills: 0 | Status: DISCONTINUED | OUTPATIENT
Start: 2022-08-12 | End: 2022-08-15

## 2022-08-12 RX ADMIN — Medication 10 MILLIGRAM(S): at 20:04

## 2022-08-12 RX ADMIN — Medication 5 UNIT(S): at 08:01

## 2022-08-12 RX ADMIN — Medication 15 UNIT(S): at 23:43

## 2022-08-12 RX ADMIN — Medication 1: at 08:02

## 2022-08-12 RX ADMIN — Medication 650 MILLIGRAM(S): at 12:43

## 2022-08-12 RX ADMIN — PIPERACILLIN AND TAZOBACTAM 25 GRAM(S): 4; .5 INJECTION, POWDER, LYOPHILIZED, FOR SOLUTION INTRAVENOUS at 23:03

## 2022-08-12 RX ADMIN — FENTANYL CITRATE 25 MICROGRAM(S): 50 INJECTION INTRAVENOUS at 19:50

## 2022-08-12 RX ADMIN — SODIUM CHLORIDE 75 MILLILITER(S): 9 INJECTION, SOLUTION INTRAVENOUS at 15:26

## 2022-08-12 RX ADMIN — PIPERACILLIN AND TAZOBACTAM 25 GRAM(S): 4; .5 INJECTION, POWDER, LYOPHILIZED, FOR SOLUTION INTRAVENOUS at 05:33

## 2022-08-12 RX ADMIN — FENTANYL CITRATE 25 MICROGRAM(S): 50 INJECTION INTRAVENOUS at 19:33

## 2022-08-12 RX ADMIN — SODIUM CHLORIDE 75 MILLILITER(S): 9 INJECTION, SOLUTION INTRAVENOUS at 01:15

## 2022-08-12 RX ADMIN — PIPERACILLIN AND TAZOBACTAM 25 GRAM(S): 4; .5 INJECTION, POWDER, LYOPHILIZED, FOR SOLUTION INTRAVENOUS at 12:44

## 2022-08-12 RX ADMIN — Medication 650 MILLIGRAM(S): at 13:43

## 2022-08-12 RX ADMIN — SODIUM CHLORIDE 75 MILLILITER(S): 9 INJECTION, SOLUTION INTRAVENOUS at 20:05

## 2022-08-12 NOTE — DISCHARGE NOTE PROVIDER - NSDCCPCAREPLAN_GEN_ALL_CORE_FT
PRINCIPAL DISCHARGE DIAGNOSIS  Diagnosis: Chronic osteomyelitis  Assessment and Plan of Treatment:       SECONDARY DISCHARGE DIAGNOSES  Diagnosis: Hyperlipidemia  Assessment and Plan of Treatment: You were started on a statin. Continue cholesterol control medications. Continue DASH diet. Follow up with your PCP within 1 week of discharge for further management and monitoring of lipid and cholesterol panels. Please call to make an appointment    Diagnosis: Diabetic foot ulcer  Assessment and Plan of Treatment:     Diagnosis: Type 2 diabetes mellitus  Assessment and Plan of Treatment: Your HgbA1c: 9.2  Continue your medication regimen and a consistent carbohydrate diet (Meaning eating the same amount of carbohydrates at the same time each day). Monitor blood glucose levels throughout the day before meals and at bedtime. Record blood sugars and bring to outpatient providers appointment in order to be reviewed by your doctor for management modifications. If your sugars are more than 400 or less than 70 you should contact your PCP immediately. Monitor for signs/symptoms of low blood glucose, such as, dizziness, altered mental status, or cool/clammy skin. In addition, monitor for signs/symptoms of high blood glucose, such as, feeling hot, dry, fatigued, or with increased thirst/urination. Make regular podiatry appointments in order to have feet checked for wounds and uncontrolled toe nail growth to prevent infections, as well as, appointments with an ophthalmologist to monitor your vision.      Diagnosis: IDA (acute kidney injury)  Assessment and Plan of Treatment:      PRINCIPAL DISCHARGE DIAGNOSIS  Diagnosis: Diabetic foot ulcer  Assessment and Plan of Treatment: You were found to have an infected diabetic ulcer of your right great toe and your x-ray was concerning for osteomyelitis (bone infection).  You had an MRI done which showed osteomyelitis. You were treated with antibiotics.  You were followed by podiatry. You had a right foot partial hallux amputation on 8/12. Your antibiotics were discontinued given low concern for post-op residual infection and your bone cultures have been negtaive so far.  Podiatry recommends you be non-weight bearing with crutches and wear CAM boot to keep your right foot offloaded and allow good healing. Please follow up at Podiatry clinic with Dr. Dowling within 1 week of discharge.      SECONDARY DISCHARGE DIAGNOSES  Diagnosis: Hyperlipidemia  Assessment and Plan of Treatment: You were started on a statin. Continue cholesterol control medications. Continue DASH diet. Follow up with your PCP within 1 week of discharge for further management and monitoring of lipid and cholesterol panels. Please call to make an appointment    Diagnosis: Type 2 diabetes mellitus  Assessment and Plan of Treatment: Your HgbA1c:   9.2%  Continue your medication regimen and a consistent carbohydrate diet (Meaning eating the same amount of carbohydrates at the same time each day). Monitor blood glucose levels throughout the day before meals and at bedtime. Record blood sugars and bring to outpatient providers appointment in order to be reviewed by your doctor for management modifications. If your sugars are more than 400 or less than 70 you should contact your PCP immediately. Monitor for signs/symptoms of low blood glucose, such as, dizziness, altered mental status, or cool/clammy skin. In addition, monitor for signs/symptoms of high blood glucose, such as, feeling hot, dry, fatigued, or with increased thirst/urination. Make regular podiatry appointments in order to have feet checked for wounds and uncontrolled toe nail growth to prevent infections, as well as, appointments with an ophthalmologist to monitor your vision.  PLEASE DISCUSS WITH YOUR PRIMARY CARE PHYSICIAN THE NEED FOR AN ENDOCRINOLOGIST REFERRAL FOR BETTER DIABETES CONTROL.  *********************************************************************  YOUR BLOOD PRESSURE WAS FOUND TO BE ABOVE THE GOAL, YOU WERE STARTED ON LOSARTAN 25MG DAILY. Continue blood pressure medication regimen as directed. Monitor for any visual changes, headaches or dizziness.  Monitor blood pressure regularly.  Follow up with your primary care provider or cardiologist for further management for high blood pressure.      Diagnosis: IDA (acute kidney injury)  Assessment and Plan of Treatment: You were found to have an elevation in your creatinine of 1.69. You creatinine has now been back to normal.

## 2022-08-12 NOTE — PROVIDER CONTACT NOTE (CHANGE IN STATUS NOTIFICATION) - RECOMMENDATIONS
Pt signed out from anesthesia, please advise. Second dose hydralazine?   PA aware of history and days events.   2035 PA at bedside, aware of EKG from 8/9/22 in chart, no labs from PACU. Per PA pt able to go to floor with current BP. PA spoke with patient and requested transfer to telemetry.

## 2022-08-12 NOTE — DISCHARGE NOTE PROVIDER - DETAILS OF MALNUTRITION DIAGNOSIS/DIAGNOSES
This patient has been assessed with a concern for Malnutrition and was treated during this hospitalization for the following Nutrition diagnosis/diagnoses:     -  08/11/2022: Severe protein-calorie malnutrition

## 2022-08-12 NOTE — PROGRESS NOTE ADULT - SUBJECTIVE AND OBJECTIVE BOX
Patient is a 67y old  Male who presents with a chief complaint of toe wound    SUBJECTIVE / OVERNIGHT EVENTS:    No CP, SOB, f/c/n/v  Voiding, ate a ton last night in anticipation for NPO    MEDICATIONS  (STANDING):  acetaminophen     Tablet .. 650 milliGRAM(s) Oral every 6 hours  enoxaparin Injectable 40 milliGRAM(s) SubCutaneous every 24 hours  insulin detemir injectable (LEVEMIR) 5 Unit(s) SubCutaneous before breakfast  insulin detemir injectable (LEVEMIR) 15 Unit(s) SubCutaneous at bedtime  insulin lispro (ADMELOG) corrective regimen sliding scale   SubCutaneous three times a day before meals  insulin lispro (ADMELOG) corrective regimen sliding scale   SubCutaneous at bedtime  insulin lispro Injectable (ADMELOG) 2 Unit(s) SubCutaneous three times a day before meals  lactated ringers. 1000 milliLiter(s) (75 mL/Hr) IV Continuous <Continuous>  piperacillin/tazobactam IVPB.. 3.375 Gram(s) IV Intermittent every 8 hours    MEDICATIONS  (PRN):  aluminum hydroxide/magnesium hydroxide/simethicone Suspension 30 milliLiter(s) Oral every 4 hours PRN Dyspepsia  dextrose Oral Gel 15 Gram(s) Oral once PRN Blood Glucose LESS THAN 70 milliGRAM(s)/deciliter  ibuprofen  Tablet. 600 milliGRAM(s) Oral every 8 hours PRN Moderate Pain (4 - 6), Severe Pain (7 - 10)  melatonin 3 milliGRAM(s) Oral at bedtime PRN Insomnia  ondansetron Injectable 4 milliGRAM(s) IV Push every 8 hours PRN Nausea and/or Vomiting    T(C): 36.4 (08-12-22 @ 05:38), Max: 36.7 (08-11-22 @ 13:19)  HR: 72 (08-12-22 @ 05:38) (61 - 88)  BP: 121/73 (08-12-22 @ 05:38) (121/73 - 179/74)  RR: 18 (08-12-22 @ 05:38) (17 - 18)  SpO2: 100% (08-12-22 @ 05:38) (100% - 100%)    CAPILLARY BLOOD GLUCOSE  POCT Blood Glucose.: 120 mg/dL (12 Aug 2022 11:46)  POCT Blood Glucose.: 163 mg/dL (12 Aug 2022 07:31)  POCT Blood Glucose.: 254 mg/dL (11 Aug 2022 22:47)  POCT Blood Glucose.: 247 mg/dL (11 Aug 2022 17:16)    PHYSICAL EXAM:  GENERAL: NAD, well-developed  CHEST/LUNG: Clear to auscultation bilaterally; No wheeze  HEART: Regular rate and rhythm; No murmurs, rubs, or gallops  ABDOMEN: Soft, Nontender, Nondistended; Bowel sounds present  EXTREMITIES:   warm and well perfused, No clubbing, cyanosis, or edema  PSYCH: AAOx3  NEUROLOGY: non-focal  SKIN: R foot wrapped       LABS:                        11.1   5.51  )-----------( 215      ( 12 Aug 2022 07:26 )             34.2     08-12    135  |  104  |  27<H>  ----------------------------<  159<H>  4.3   |  22  |  0.89    Ca    9.4      12 Aug 2022 07:26  Phos  3.7     08-11  Mg     2.10     08-11    PT/INR - ( 12 Aug 2022 07:26 )   PT: 10.7 sec;   INR: 0.92 ratio    PTT - ( 12 Aug 2022 07:26 )  PTT:29.9 sec    Microbiology: Culture Results:   Moderate Staphylococcus aureus (08-08 @ 23:05)    Consultant(s) Notes Reviewed:    Care Discussed with Consultants/Other Providers:

## 2022-08-12 NOTE — PROVIDER CONTACT NOTE (CHANGE IN STATUS NOTIFICATION) - ASSESSMENT
Per anesthesia pt had frequent PVCs in OR, PVCs resolved in PACU and have now returned. Per team at PACU arrival, no labs at this time. Lab orders for 8/13/22 0600. Pt now hypertensive (see vitals),per anesthesia PA 10mg hydralazine IVP given w/ minimal response. Pt HR 50s-60s, denies chest pain, shortness of breath, pain. Maintaining >98% O2 sat on room air. Pt offers no complaints, tolerating PO intake. Dressing intact, no S&S of bleeding.

## 2022-08-12 NOTE — PROGRESS NOTE ADULT - PROBLEM SELECTOR PLAN 1
Infected diabetic ulcer of great toe with x-ray concerning for OM, recent cx w/ MSSA, Proteus, Enterococcus all sensitive to zosyn   - MRI 8/10, confirms OM  - podiatry following   - s/p vanco, c/w zosyn (8/8-)  - MRSA screen neg  - wound culture: staph, not MRSA, can change to unasyn  - pain control  - podiatry following,  plan for R foot partial first ray resection on 8/12  - RCRI score of 1, low risk for low risk procedure, no CP/SOB/HF optimized for light sedation and local for amputation w/ podiatry on 8/12

## 2022-08-12 NOTE — BRIEF OPERATIVE NOTE - SPECIMENS
microbiology: right foot proximal phalanx clean bone. Pathology: right foot dirty bone, right foot proximal phalanx clean bone.

## 2022-08-12 NOTE — PROGRESS NOTE ADULT - PROBLEM SELECTOR PLAN 5
H  dispo OR on 8/12   re: insurance lapse for meds  states needs 24 hours notice to arrange ride from family in [place] for dc

## 2022-08-12 NOTE — DISCHARGE NOTE PROVIDER - PROVIDER TOKENS
PROVIDER:[TOKEN:[[id number]],FOLLOWUP:[1 week]],FREE:[LAST:[Otugh],FIRST:[El],PHONE:[phone]],FAX:[(   )    -],ADDRESS:[[place]],FOLLOWUP:[2 weeks]]

## 2022-08-12 NOTE — DISCHARGE NOTE PROVIDER - NSDCMRMEDTOKEN_GEN_ALL_CORE_FT
Levemir FlexTouch 100 units/mL subcutaneous solution: 15 unit(s) subcutaneous in the morning, 20 units at bedtime  Vitamin D2 50,000 intl units (1.25 mg) oral capsule: 1 cap(s) orally every 7 days    Levemir FlexTouch 100 units/mL subcutaneous solution: 15 unit(s) subcutaneous in the morning, 20 units at bedtime  Right CAM walker boot.: Please provide right CAM walker boot.  Ind: To wear daily as instructed.  Dx: Right foot partial first ray amputation.  Vitamin D2 50,000 intl units (1.25 mg) oral capsule: 1 cap(s) orally every 7 days    atorvastatin 20 mg oral tablet: 1 tab(s) orally once a day (at bedtime)  Levemir FlexTouch 100 units/mL subcutaneous solution: 15 unit(s) subcutaneous in the morning, 20 units at bedtime  Levemir FlexTouch 100 units/mL subcutaneous solution: 25 TOTAL unit(s) subcutaneous once a day, continue with 10 units before breakfast and 15 units at bedtime, please test coverage [facility] [id number]  losartan 25 mg oral tablet: 1 tab(s) orally once a day  Right CAM walker boot.: Please provide right CAM walker boot.  Ind: To wear daily as instructed.  Dx: Right foot partial first ray amputation.  Vitamin D2 50,000 intl units (1.25 mg) oral capsule: 1 cap(s) orally every 7 days    atorvastatin 20 mg oral tablet: 1 tab(s) orally once a day (at bedtime)  Levemir FlexTouch 100 units/mL subcutaneous solution: 15 unit(s) subcutaneous in the morning, 20 units at bedtime  losartan 25 mg oral tablet: 1 tab(s) orally once a day  Right CAM walker boot.: Please provide right CAM walker boot.  Ind: To wear daily as instructed.  Dx: Right foot partial first ray amputation.  Vitamin D2 50,000 intl units (1.25 mg) oral capsule: 1 cap(s) orally every 7 days

## 2022-08-12 NOTE — DISCHARGE NOTE PROVIDER - NSDCFUADDINST_GEN_ALL_CORE_FT
Right Foot: non-weightbearing with crutches and a CAM boot  Right Lower Extremity: non-weightbearing with crutches and a CAM boot

## 2022-08-12 NOTE — PROGRESS NOTE ADULT - PROBLEM SELECTOR PLAN 3
poorly controlled w/ Hb A1c 9.2%, oddly on Levemir 15 units in AM, 20 in PM  - c/w 10 am and 15 units in pm--> for OR 5/15 units  - add 2 units w/ meals   - c/w DM diet, BUD  - ANGELA consult

## 2022-08-12 NOTE — DISCHARGE NOTE PROVIDER - CARE PROVIDER_API CALL
Bean Dowling  PODIATRIC MEDICINE AND SURGERY  [address], [address]  [place] [address]  Phone: [phone]  Fax: [phone]  Follow Up Time: 1 week    El Horne  [place]  Phone: [phone]  Fax: (   )    -  Follow Up Time: 2 weeks

## 2022-08-12 NOTE — PROGRESS NOTE ADULT - ASSESSMENT
Plan:   To OR today at 1600 with Dr. Dowling for RIGHT foot Partial First Ray Resection .   CXR on sunrise.  EKG on sunrise.  Medical clearance since 8/11 and documented in chart.  Consent signed and in chart.  Procedure was explained to patient in detail. All alternatives, risks and complications were discussed. All questions answered.

## 2022-08-12 NOTE — BRIEF OPERATIVE NOTE - OPERATION/FINDINGS
s/p R foot partial hallux amputation, bone at proximal was hard and of good quality. Adequate intra- op bleeding, no purulence or evidence of soft tissue infection. close with 3.0 vicryl and 4.0 prolene

## 2022-08-12 NOTE — DISCHARGE NOTE PROVIDER - NSDCFUADDAPPT_GEN_ALL_CORE_FT
Podiatry Discharge Instructions:  Follow up: Please follow up with Dr. Dowling within 1 week of discharge from the hospital, please call [phone] for appointment and discuss that you recently were seen in the hospital.  Wound Care: Please leave your dressing clean dry intact until your follow up appointment  Weight bearing: Please weight bear as tolerated in a surgical shoe.  Antibiotics: Please continue as instructed. Podiatry Discharge Instructions:  Follow up: Please follow up with Dr. Dowling within 1 week of discharge from the hospital, please call [phone] for appointment and discuss that you recently were seen in the hospital.  Wound Care: Please leave your dressing clean dry intact until your follow up appointment  Weight bearing: Please keep right foot non-weightbearing and ALWAYS in a CAM walker.  Antibiotics: Please continue as instructed.

## 2022-08-12 NOTE — PROGRESS NOTE ADULT - SUBJECTIVE AND OBJECTIVE BOX
Podiatry Pager #: [phone]    Patient is a 67y old  Male who presents with a chief complaint of Other chronic osteomyelitis     (11 Aug 2022 14:01)      INTERVAL HPI/OVERNIGHT EVENTS:   Pt is scheduled for RIGHT foot Partial First Ray Resection with Dr. Dowling at 1600 . Patient is aware of procedure and is NPO since midnight.    MEDICATIONS  (STANDING):  acetaminophen     Tablet .. 650 milliGRAM(s) Oral every 6 hours  dextrose 50% Injectable 25 Gram(s) IV Push once  dextrose 50% Injectable 12.5 Gram(s) IV Push once  dextrose 50% Injectable 25 Gram(s) IV Push once  enoxaparin Injectable 40 milliGRAM(s) SubCutaneous every 24 hours  insulin detemir injectable (LEVEMIR) 5 Unit(s) SubCutaneous before breakfast  insulin detemir injectable (LEVEMIR) 15 Unit(s) SubCutaneous at bedtime  insulin lispro (ADMELOG) corrective regimen sliding scale   SubCutaneous three times a day before meals  insulin lispro (ADMELOG) corrective regimen sliding scale   SubCutaneous at bedtime  insulin lispro Injectable (ADMELOG) 2 Unit(s) SubCutaneous three times a day before meals  lactated ringers. 1000 milliLiter(s) (75 mL/Hr) IV Continuous <Continuous>  piperacillin/tazobactam IVPB.. 3.375 Gram(s) IV Intermittent every 8 hours    MEDICATIONS  (PRN):  aluminum hydroxide/magnesium hydroxide/simethicone Suspension 30 milliLiter(s) Oral every 4 hours PRN Dyspepsia  dextrose Oral Gel 15 Gram(s) Oral once PRN Blood Glucose LESS THAN 70 milliGRAM(s)/deciliter  ibuprofen  Tablet. 600 milliGRAM(s) Oral every 8 hours PRN Moderate Pain (4 - 6), Severe Pain (7 - 10)  melatonin 3 milliGRAM(s) Oral at bedtime PRN Insomnia  ondansetron Injectable 4 milliGRAM(s) IV Push every 8 hours PRN Nausea and/or Vomiting      Allergies    bananas (Rash)  Cauliflower (Other)  No Known Drug Allergies    Intolerances        Vital Signs Last 24 Hrs  T(C): 36.4 (12 Aug 2022 05:38), Max: 36.7 (11 Aug 2022 13:19)  T(F): 97.5 (12 Aug 2022 05:38), Max: 98 (11 Aug 2022 13:19)  HR: 72 (12 Aug 2022 05:38) (61 - 88)  BP: 121/73 (12 Aug 2022 05:38) (121/73 - 179/74)  BP(mean): --  RR: 18 (12 Aug 2022 05:38) (17 - 18)  SpO2: 100% (12 Aug 2022 05:38) (100% - 100%)    Parameters below as of 12 Aug 2022 05:38  Patient On (Oxygen Delivery Method): room air        LABS:                        11.6   4.99  )-----------( 214      ( 11 Aug 2022 06:45 )             35.5     08-11    139  |  109<H>  |  29<H>  ----------------------------<  168<H>  4.4   |  20<L>  |  1.01    Ca    9.3      11 Aug 2022 06:45  Phos  3.7     08-11  Mg     2.10     08-11          CAPILLARY BLOOD GLUCOSE      POCT Blood Glucose.: 254 mg/dL (11 Aug 2022 22:47)  POCT Blood Glucose.: 247 mg/dL (11 Aug 2022 17:16)  POCT Blood Glucose.: 237 mg/dL (11 Aug 2022 11:50)  POCT Blood Glucose.: 176 mg/dL (11 Aug 2022 07:36)      RADIOLOGY & ADDITIONAL TESTS:    Plan:   To OR today at 1600 with Dr. Dowling for RIGHT foot Partial First Ray Resection .   CXR on sunrise.  EKG on sunrise.  Medical clearance since 8/11 and documented in chart.  Consent signed and in chart.  Procedure was explained to patient in detail. All alternatives, risks and complications were discussed. All questions answered.

## 2022-08-12 NOTE — BRIEF OPERATIVE NOTE - COMMENTS
low concern for residual soft tissue or bone infection  low concern for viability  -f/u OR data for 48 hours of no growth  -stable for d.c from podiatry after 48 hrs of no growth OR data

## 2022-08-13 LAB
ANION GAP SERPL CALC-SCNC: 12 MMOL/L — SIGNIFICANT CHANGE UP (ref 7–14)
BUN SERPL-MCNC: 22 MG/DL — SIGNIFICANT CHANGE UP (ref 7–23)
CALCIUM SERPL-MCNC: 9.5 MG/DL — SIGNIFICANT CHANGE UP (ref 8.4–10.5)
CHLORIDE SERPL-SCNC: 105 MMOL/L — SIGNIFICANT CHANGE UP (ref 98–107)
CHOLEST SERPL-MCNC: 166 MG/DL — SIGNIFICANT CHANGE UP
CO2 SERPL-SCNC: 23 MMOL/L — SIGNIFICANT CHANGE UP (ref 22–31)
CREAT SERPL-MCNC: 0.9 MG/DL — SIGNIFICANT CHANGE UP (ref 0.5–1.3)
EGFR: 95 ML/MIN/1.73M2 — SIGNIFICANT CHANGE UP
GLUCOSE BLDC GLUCOMTR-MCNC: 113 MG/DL — HIGH (ref 70–99)
GLUCOSE BLDC GLUCOMTR-MCNC: 122 MG/DL — HIGH (ref 70–99)
GLUCOSE BLDC GLUCOMTR-MCNC: 149 MG/DL — HIGH (ref 70–99)
GLUCOSE BLDC GLUCOMTR-MCNC: 192 MG/DL — HIGH (ref 70–99)
GLUCOSE SERPL-MCNC: 168 MG/DL — HIGH (ref 70–99)
GRAM STN FLD: SIGNIFICANT CHANGE UP
HCT VFR BLD CALC: 37.1 % — LOW (ref 39–50)
HDLC SERPL-MCNC: 39 MG/DL — LOW
HGB BLD-MCNC: 11.7 G/DL — LOW (ref 13–17)
LIPID PNL WITH DIRECT LDL SERPL: 86 MG/DL — SIGNIFICANT CHANGE UP
MAGNESIUM SERPL-MCNC: 2.1 MG/DL — SIGNIFICANT CHANGE UP (ref 1.6–2.6)
MCHC RBC-ENTMCNC: 29.9 PG — SIGNIFICANT CHANGE UP (ref 27–34)
MCHC RBC-ENTMCNC: 31.5 GM/DL — LOW (ref 32–36)
MCV RBC AUTO: 94.9 FL — SIGNIFICANT CHANGE UP (ref 80–100)
NON HDL CHOLESTEROL: 127 MG/DL — SIGNIFICANT CHANGE UP
NRBC # BLD: 0 /100 WBCS — SIGNIFICANT CHANGE UP
NRBC # FLD: 0 K/UL — SIGNIFICANT CHANGE UP
PHOSPHATE SERPL-MCNC: 3.7 MG/DL — SIGNIFICANT CHANGE UP (ref 2.5–4.5)
PLATELET # BLD AUTO: 220 K/UL — SIGNIFICANT CHANGE UP (ref 150–400)
POTASSIUM SERPL-MCNC: 3.6 MMOL/L — SIGNIFICANT CHANGE UP (ref 3.5–5.3)
POTASSIUM SERPL-SCNC: 3.6 MMOL/L — SIGNIFICANT CHANGE UP (ref 3.5–5.3)
RBC # BLD: 3.91 M/UL — LOW (ref 4.2–5.8)
RBC # FLD: 14.2 % — SIGNIFICANT CHANGE UP (ref 10.3–14.5)
SODIUM SERPL-SCNC: 140 MMOL/L — SIGNIFICANT CHANGE UP (ref 135–145)
SPECIMEN SOURCE: SIGNIFICANT CHANGE UP
TRIGL SERPL-MCNC: 204 MG/DL — HIGH
WBC # BLD: 7.32 K/UL — SIGNIFICANT CHANGE UP (ref 3.8–10.5)
WBC # FLD AUTO: 7.32 K/UL — SIGNIFICANT CHANGE UP (ref 3.8–10.5)

## 2022-08-13 PROCEDURE — 99232 SBSQ HOSP IP/OBS MODERATE 35: CPT

## 2022-08-13 RX ORDER — POTASSIUM CHLORIDE 20 MEQ
40 PACKET (EA) ORAL ONCE
Refills: 0 | Status: COMPLETED | OUTPATIENT
Start: 2022-08-13 | End: 2022-08-13

## 2022-08-13 RX ORDER — OXYCODONE AND ACETAMINOPHEN 5; 325 MG/1; MG/1
1 TABLET ORAL ONCE
Refills: 0 | Status: DISCONTINUED | OUTPATIENT
Start: 2022-08-13 | End: 2022-08-13

## 2022-08-13 RX ORDER — ATORVASTATIN CALCIUM 80 MG/1
20 TABLET, FILM COATED ORAL AT BEDTIME
Refills: 0 | Status: DISCONTINUED | OUTPATIENT
Start: 2022-08-13 | End: 2022-08-15

## 2022-08-13 RX ORDER — POTASSIUM CHLORIDE 20 MEQ
20 PACKET (EA) ORAL ONCE
Refills: 0 | Status: DISCONTINUED | OUTPATIENT
Start: 2022-08-13 | End: 2022-08-13

## 2022-08-13 RX ADMIN — Medication 1: at 13:10

## 2022-08-13 RX ADMIN — Medication 15 UNIT(S): at 21:46

## 2022-08-13 RX ADMIN — PIPERACILLIN AND TAZOBACTAM 25 GRAM(S): 4; .5 INJECTION, POWDER, LYOPHILIZED, FOR SOLUTION INTRAVENOUS at 06:46

## 2022-08-13 RX ADMIN — Medication 40 MILLIEQUIVALENT(S): at 14:56

## 2022-08-13 RX ADMIN — Medication 40 MILLIEQUIVALENT(S): at 21:37

## 2022-08-13 RX ADMIN — Medication 600 MILLIGRAM(S): at 21:36

## 2022-08-13 RX ADMIN — Medication 600 MILLIGRAM(S): at 22:30

## 2022-08-13 RX ADMIN — PIPERACILLIN AND TAZOBACTAM 25 GRAM(S): 4; .5 INJECTION, POWDER, LYOPHILIZED, FOR SOLUTION INTRAVENOUS at 14:57

## 2022-08-13 RX ADMIN — ATORVASTATIN CALCIUM 20 MILLIGRAM(S): 80 TABLET, FILM COATED ORAL at 21:39

## 2022-08-13 RX ADMIN — Medication 2 UNIT(S): at 18:37

## 2022-08-13 RX ADMIN — ENOXAPARIN SODIUM 40 MILLIGRAM(S): 100 INJECTION SUBCUTANEOUS at 21:46

## 2022-08-13 RX ADMIN — Medication 2 UNIT(S): at 09:27

## 2022-08-13 RX ADMIN — OXYCODONE AND ACETAMINOPHEN 1 TABLET(S): 5; 325 TABLET ORAL at 12:25

## 2022-08-13 RX ADMIN — Medication 2 UNIT(S): at 13:10

## 2022-08-13 NOTE — PROGRESS NOTE ADULT - SUBJECTIVE AND OBJECTIVE BOX
Patient is a 67y old  Male who presents with a chief complaint of toe wound    SUBJECTIVE / OVERNIGHT EVENTS:    No CP, SOB, f/c/n/v     MEDICATIONS  (STANDING):  acetaminophen     Tablet .. 650 milliGRAM(s) Oral every 6 hours  enoxaparin Injectable 40 milliGRAM(s) SubCutaneous every 24 hours  insulin detemir injectable (LEVEMIR) 15 Unit(s) SubCutaneous at bedtime  insulin detemir injectable (LEVEMIR) 10 Unit(s) SubCutaneous before breakfast  insulin lispro (ADMELOG) corrective regimen sliding scale   SubCutaneous three times a day before meals  insulin lispro (ADMELOG) corrective regimen sliding scale   SubCutaneous at bedtime  insulin lispro Injectable (ADMELOG) 2 Unit(s) SubCutaneous three times a day before meals  lactated ringers. 1000 milliLiter(s) (75 mL/Hr) IV Continuous <Continuous>  piperacillin/tazobactam IVPB.. 3.375 Gram(s) IV Intermittent every 8 hours  potassium chloride    Tablet ER 40 milliEquivalent(s) Oral once    MEDICATIONS  (PRN):  acetaminophen     Tablet .. 650 milliGRAM(s) Oral every 6 hours PRN Mild Pain (1 - 3)  aluminum hydroxide/magnesium hydroxide/simethicone Suspension 30 milliLiter(s) Oral every 4 hours PRN Dyspepsia  dextrose Oral Gel 15 Gram(s) Oral once PRN Blood Glucose LESS THAN 70 milliGRAM(s)/deciliter  ibuprofen  Tablet. 600 milliGRAM(s) Oral every 8 hours PRN Moderate Pain (4 - 6), Severe Pain (7 - 10)  melatonin 3 milliGRAM(s) Oral at bedtime PRN Insomnia  ondansetron Injectable 4 milliGRAM(s) IV Push every 8 hours PRN Nausea and/or Vomiting    T(C): 36.8 (08-13-22 @ 06:00), Max: 36.8 (08-13-22 @ 06:00)  HR: 65 (08-13-22 @ 06:00) (54 - 82)  BP: 129/63 (08-13-22 @ 06:00) (129/63 - 190/80)  RR: 18 (08-13-22 @ 06:00) (12 - 24)  SpO2: 100% (08-13-22 @ 06:00) (96% - 100%)    CAPILLARY BLOOD GLUCOSE  POCT Blood Glucose.: 192 mg/dL (13 Aug 2022 12:46)  POCT Blood Glucose.: 122 mg/dL (13 Aug 2022 08:51)  POCT Blood Glucose.: 161 mg/dL (12 Aug 2022 22:03)  POCT Blood Glucose.: 100 mg/dL (12 Aug 2022 19:22)    I&O's Summary    12 Aug 2022 07:01  -  13 Aug 2022 07:00  --------------------------------------------------------  IN: 630 mL / OUT: 1250 mL / NET: -620 mL    PHYSICAL EXAM:  GENERAL: NAD, well-developed  CHEST/LUNG: Clear to auscultation bilaterally; No wheeze  HEART: Regular rate and rhythm; No murmurs, rubs, or gallops  ABDOMEN: Soft, Nontender, Nondistended; Bowel sounds present  EXTREMITIES:   warm and well perfused, No clubbing, cyanosis, or edema  PSYCH: AAOx3  NEUROLOGY: non-focal  SKIN: R foot wrapped     LABS:                        11.7   7.32  )-----------( 220      ( 13 Aug 2022 01:46 )             37.1     08-13    140  |  105  |  22  ----------------------------<  168<H>  3.6   |  23  |  0.90    Ca    9.5      13 Aug 2022 01:46  Phos  3.7     08-13  Mg     2.10     08-13    PT/INR - ( 12 Aug 2022 07:26 )   PT: 10.7 sec;   INR: 0.92 ratio    PTT - ( 12 Aug 2022 07:26 )  PTT:29.9 sec    Microbiology: Culture Results:   Moderate Staphylococcus aureus (08-08 @ 23:05)    Consultant(s) Notes Reviewed:  podiatry   Care Discussed with Consultants/Other Providers:

## 2022-08-13 NOTE — CHART NOTE - NSCHARTNOTEFT_GEN_A_CORE
PACU RN reported elevated blood pressure     PACU RN reported blood pressure of 190/80 while patient was in PACU. Patient also had multiple episodes of Chart reviewed, Patient assessed at bedside. PACU RN reported elevated blood pressure     PACU RN reported blood pressure of 190/80 while patient was in PACU, ASx, vital signs otherwise stable. Patient also had multiple runs of Bigeminy and trigeminy intraoperatively as per RN. Chart reviewed, Patient assessed at bedside, he offers no complaints and states he feels well. Pt denies chest pain, palpitations, SOB, cough, wheezing, abdominal pain, nausea, vomiting. AOx4, NAD, Heart S1 S2 no MRC, lungs CTABL.       Vital Signs Last 24 Hrs  T(C): 36.5 (12 Aug 2022 22:28), Max: 36.6 (12 Aug 2022 15:47)  T(F): 97.7 (12 Aug 2022 22:28), Max: 97.9 (12 Aug 2022 15:47)  HR: 82 (12 Aug 2022 22:28) (54 - 82)  BP: 158/71 (12 Aug 2022 22:28) (121/73 - 190/80)  BP(mean): 94 (12 Aug 2022 21:00) (90 - 107)  RR: 18 (12 Aug 2022 22:28) (12 - 24)  SpO2: 100% (12 Aug 2022 22:28) (96% - 100%)  Parameters below as of 12 Aug 2022 22:28  Patient On (Oxygen Delivery Method): room air      -Blood pressure improved (see above) s/p hydralazine 10 mg IVP x1 ordered by anesthesia PA in PACU  -PVCs seen on cardiac monitor in PACU. Pt has no history of any arrhthymias and there were no PVCs on his initial EKG.   -Pt transferred to telemetry floor from PACU for close monitoring given new PVCs

## 2022-08-13 NOTE — PROGRESS NOTE ADULT - SUBJECTIVE AND OBJECTIVE BOX
Patient is a 67y old  Male who presents with a chief complaint of toe wound (12 Aug 2022 19:57)       INTERVAL HPI/OVERNIGHT EVENTS:  Patient seen and evaluated at bedside.  Pt is resting comfortable in NAD. Denies N/V/F/C.    Allergies    bananas (Rash)  Cauliflower (Other)  No Known Drug Allergies    Intolerances        Vital Signs Last 24 Hrs  T(C): 36.8 (13 Aug 2022 06:00), Max: 36.8 (13 Aug 2022 06:00)  T(F): 98.3 (13 Aug 2022 06:00), Max: 98.3 (13 Aug 2022 06:00)  HR: 65 (13 Aug 2022 06:00) (54 - 82)  BP: 129/63 (13 Aug 2022 06:00) (129/63 - 190/80)  BP(mean): 94 (12 Aug 2022 21:00) (90 - 107)  RR: 18 (13 Aug 2022 06:00) (12 - 24)  SpO2: 100% (13 Aug 2022 06:00) (96% - 100%)    Parameters below as of 13 Aug 2022 06:00  Patient On (Oxygen Delivery Method): room air        LABS:                        11.7   7.32  )-----------( 220      ( 13 Aug 2022 01:46 )             37.1     08-13    140  |  105  |  22  ----------------------------<  168<H>  3.6   |  23  |  0.90    Ca    9.5      13 Aug 2022 01:46  Phos  3.7     08-13  Mg     2.10     08-13      PT/INR - ( 12 Aug 2022 07:26 )   PT: 10.7 sec;   INR: 0.92 ratio         PTT - ( 12 Aug 2022 07:26 )  PTT:29.9 sec    CAPILLARY BLOOD GLUCOSE      POCT Blood Glucose.: 122 mg/dL (13 Aug 2022 08:51)  POCT Blood Glucose.: 161 mg/dL (12 Aug 2022 22:03)  POCT Blood Glucose.: 100 mg/dL (12 Aug 2022 19:22)  POCT Blood Glucose.: 120 mg/dL (12 Aug 2022 11:46)      Lower Extremity Physical Exam:    Vasular: DP/PT 2/4, B/L, CFT < 3 seconds B/L, Temperature gradient warm to cool, B/L.   Neuro: Epicritic sensation absent to the level of the digits, B/L.  Musculoskeletal/Ortho: s/p L foot partial fifth ray resection, healed  Skin:   - s/p R foot partial hallux amputation, low concern for residual soft tissue or bone infection, low concern for viability.   - Skin: Sutures intact, skin well coapted, no hematoma expressed, mild teresa-wound erythema within normal post -op course, no pus nor drainage noted, skin flap appears warm, medial distal corner presents reduced cap refill.  - Left foot no signs of infection.     RADIOLOGY & ADDITIONAL TESTS:

## 2022-08-13 NOTE — PROGRESS NOTE ADULT - ASSESSMENT
67y Male s/p R foot partial hallux amputation POD 1  - Patient seen and evaluated  - Afebrile, WBC 7.32  - s/p R foot partial hallux amputation, low concern for residual soft tissue or bone infection, low concern for viability.   - Skin: Sutures intact, skin well coapted, no hematoma expressed, mild teresa-wound erythema within normal post -op course, no pus nor drainage noted, skin flap appears warm, medial distal corner presents reduced cap refill.  - Left foot no signs of infection.   - Pod standpoint stable for discharge once clean bone margin culture has no growth of 48 hours.   - Keep dressing Clean Dry and Intact until follow up in the clinic/office, follow up info in the discharge provider note.   - Discussed with attending.    67y Male s/p R foot partial hallux amputation POD 1  - Patient seen and evaluated  - Afebrile, WBC 7.32  - s/p R foot partial hallux amputation, low concern for residual soft tissue or bone infection, low concern for viability.   - Skin: Sutures intact, skin well coapted, no hematoma expressed, mild teresa-wound erythema within normal post -op course, no pus nor drainage noted, skin flap appears warm, medial distal corner presents reduced cap refill.  - Left foot no signs of infection.   - Clean Bone culture pending.   - Pod standpoint stable for discharge once clean bone margin culture has no growth of 48 hours.   - Keep dressing Clean Dry and Intact until follow up in the clinic/office, follow up info in the discharge provider note.   - Discussed with attending.

## 2022-08-13 NOTE — PROGRESS NOTE ADULT - PROBLEM SELECTOR PLAN 3
poorly controlled w/ Hb A1c 9.2%, oddly on Levemir 15 units in AM, 20 in PM  - c/w 10 am and 15 units in pm  - c/w 2 units w/ meals   - c/w DM diet, BUD packer

## 2022-08-13 NOTE — PROGRESS NOTE ADULT - PROBLEM SELECTOR PLAN 5
[facility]  dispo pending OR cx NG x 48 hours   SW re: insurance lapse for meds  PT no needs, cane given   states needs 24 hours notice to arrange ride from family in [place] for dc

## 2022-08-13 NOTE — PROGRESS NOTE ADULT - PROBLEM SELECTOR PLAN 1
Infected diabetic ulcer of great toe with x-ray concerning for OM, recent cx w/ MSSA, Proteus, Enterococcus all sensitive to zosyn   - MRI 8/10, confirms OM  - podiatry following   - s/p vanco, c/w zosyn (8/8-8/13), given s/p OR w/ low concern for residual infection will dc abx  - MRSA screen neg  - wound culture: staph, not MRSA   - pain control  - s/p R foot partial first ray resection on 8/12  - bigeminy post op on monitor, no sx, cannot BB given low HR at baseline

## 2022-08-14 LAB
CULTURE RESULTS: SIGNIFICANT CHANGE UP
GLUCOSE BLDC GLUCOMTR-MCNC: 137 MG/DL — HIGH (ref 70–99)
GLUCOSE BLDC GLUCOMTR-MCNC: 146 MG/DL — HIGH (ref 70–99)
GLUCOSE BLDC GLUCOMTR-MCNC: 189 MG/DL — HIGH (ref 70–99)
GLUCOSE BLDC GLUCOMTR-MCNC: 218 MG/DL — HIGH (ref 70–99)
ORGANISM # SPEC MICROSCOPIC CNT: SIGNIFICANT CHANGE UP
ORGANISM # SPEC MICROSCOPIC CNT: SIGNIFICANT CHANGE UP
SPECIMEN SOURCE: SIGNIFICANT CHANGE UP

## 2022-08-14 PROCEDURE — 99232 SBSQ HOSP IP/OBS MODERATE 35: CPT

## 2022-08-14 RX ORDER — LOSARTAN POTASSIUM 100 MG/1
25 TABLET, FILM COATED ORAL DAILY
Refills: 0 | Status: DISCONTINUED | OUTPATIENT
Start: 2022-08-14 | End: 2022-08-15

## 2022-08-14 RX ADMIN — ATORVASTATIN CALCIUM 20 MILLIGRAM(S): 80 TABLET, FILM COATED ORAL at 22:26

## 2022-08-14 RX ADMIN — Medication 15 UNIT(S): at 22:26

## 2022-08-14 RX ADMIN — Medication 10 UNIT(S): at 09:15

## 2022-08-14 RX ADMIN — ENOXAPARIN SODIUM 40 MILLIGRAM(S): 100 INJECTION SUBCUTANEOUS at 22:28

## 2022-08-14 RX ADMIN — Medication 600 MILLIGRAM(S): at 23:40

## 2022-08-14 RX ADMIN — Medication 2 UNIT(S): at 18:38

## 2022-08-14 RX ADMIN — Medication 2: at 09:15

## 2022-08-14 RX ADMIN — LOSARTAN POTASSIUM 25 MILLIGRAM(S): 100 TABLET, FILM COATED ORAL at 17:13

## 2022-08-14 RX ADMIN — Medication 600 MILLIGRAM(S): at 22:40

## 2022-08-14 RX ADMIN — Medication 2: at 13:00

## 2022-08-14 NOTE — PROGRESS NOTE ADULT - ASSESSMENT
67y Male s/p R foot partial hallux amputation POD 2  - Patient seen and evaluated.  - Afebrile, WBC 7.32.  - Skin: Sutures intact, incision dehiscing centrally, mild sanguinous drainage expressed, mild teresa-wound erythema within normal post-op limits, no pus noted, skin flap warm, lateral distal corner presents reduced cap refill.  - Left foot no wounds, no clinical signs of infection.   - Pt is non-compliant with weight bearing as tolerated with weight distributed to the right heel with a surgical shoe (he was walking without a surgical shoe this morning) so weightbearing status is now changing to non-weightbearing with crutches and a CAM boot to keep the right foot offloaded and allow adequate incision site healing.   - s/p R foot partial hallux amputation (DOS 8/12/22), low concern for residual soft tissue or bone infection, low concern for viability.   - Clean Bone Culture: No growth (preliminary).   - Pod standpoint stable for discharge now that the clean bone margin has no growth for 2 days since surgery.  - Keep dressing Clean Dry and Intact until follow up in the clinic/office, follow up info in the discharge provider note.   - Discussed with attending.

## 2022-08-14 NOTE — PROGRESS NOTE ADULT - SUBJECTIVE AND OBJECTIVE BOX
Patient is a 67y old  Male who presents with a chief complaint of toe wound    SUBJECTIVE / OVERNIGHT EVENTS:    No CP, SOB, f/c/n/v  Reports no SW has seen him    MEDICATIONS  (STANDING):  acetaminophen     Tablet .. 650 milliGRAM(s) Oral every 6 hours  atorvastatin 20 milliGRAM(s) Oral at bedtime  enoxaparin Injectable 40 milliGRAM(s) SubCutaneous every 24 hours  insulin detemir injectable (LEVEMIR) 15 Unit(s) SubCutaneous at bedtime  insulin detemir injectable (LEVEMIR) 10 Unit(s) SubCutaneous before breakfast  insulin lispro (ADMELOG) corrective regimen sliding scale   SubCutaneous three times a day before meals  insulin lispro (ADMELOG) corrective regimen sliding scale   SubCutaneous at bedtime  insulin lispro Injectable (ADMELOG) 2 Unit(s) SubCutaneous three times a day before meals    MEDICATIONS  (PRN):  acetaminophen     Tablet .. 650 milliGRAM(s) Oral every 6 hours PRN Mild Pain (1 - 3)  dextrose Oral Gel 15 Gram(s) Oral once PRN Blood Glucose LESS THAN 70 milliGRAM(s)/deciliter  ibuprofen  Tablet. 600 milliGRAM(s) Oral every 8 hours PRN Moderate Pain (4 - 6), Severe Pain (7 - 10)    T(C): 36.4 (08-14-22 @ 08:15), Max: 36.8 (08-13-22 @ 17:01)  HR: 57 (08-14-22 @ 08:15) (57 - 70)  BP: 148/69 (08-14-22 @ 08:15) (141/66 - 156/60)  RR: 16 (08-14-22 @ 08:15) (16 - 18)  SpO2: 100% (08-14-22 @ 08:15) (100% - 100%)    CAPILLARY BLOOD GLUCOSE  POCT Blood Glucose.: 218 mg/dL (14 Aug 2022 12:42)  POCT Blood Glucose.: 137 mg/dL (14 Aug 2022 08:45)  POCT Blood Glucose.: 149 mg/dL (13 Aug 2022 21:26)  POCT Blood Glucose.: 113 mg/dL (13 Aug 2022 17:40)    I&O's Summary    13 Aug 2022 07:01  -  14 Aug 2022 07:00  --------------------------------------------------------  IN: 240 mL / OUT: 600 mL / NET: -360 mL    14 Aug 2022 07:01  -  14 Aug 2022 14:07  --------------------------------------------------------  IN: 240 mL / OUT: 500 mL / NET: -260 mL    PHYSICAL EXAM:  GENERAL: NAD, well-developed  CHEST/LUNG: Clear to auscultation bilaterally; No wheeze  HEART: Regular rate and rhythm; No murmurs, rubs, or gallops  ABDOMEN: Soft, Nontender, Nondistended; Bowel sounds present  EXTREMITIES:   warm and well perfused, No clubbing, cyanosis, or edema  PSYCH: AAOx3  NEUROLOGY: non-focal  SKIN: R foot wrapped     LABS:                        11.7   7.32  )-----------( 220      ( 13 Aug 2022 01:46 )             37.1     08-13    140  |  105  |  22  ----------------------------<  168<H>  3.6   |  23  |  0.90    Ca    9.5      13 Aug 2022 01:46  Phos  3.7     08-13  Mg     2.10     08-13      Microbiology: Culture Results:   No growth (08-12 @ 18:36)  Culture Results:   Moderate Staphylococcus aureus (08-08 @ 23:05)      Consultant(s) Notes Reviewed:    Care Discussed with Consultants/Other Providers:

## 2022-08-14 NOTE — PROGRESS NOTE ADULT - PROBLEM SELECTOR PLAN 5
[facility]  dispo home  needs PT re-eval, wants CM/SW to assess insurance and also needs transport or 24 hours prior to let cousin know to get him

## 2022-08-14 NOTE — PROGRESS NOTE ADULT - PROBLEM SELECTOR PLAN 1
Infected diabetic ulcer of great toe with x-ray concerning for OM, recent cx w/ MSSA, Proteus, Enterococcus all sensitive to zosyn   - MRI 8/10, confirms OM  - podiatry following   - s/p vanco, c/w zosyn (8/8-8/13), given s/p OR w/ low concern for residual infection will dc abx  - MRSA screen neg  - wound culture: staph, not MRSA   - pain control  - s/p R foot partial first ray resection on 8/12, prelim bone margin NG (not yet 48 hours exactly)  - bigeminy post op on monitor, no sx, cannot BB given low HR at baseline  - per pod now now NWB in RLE

## 2022-08-14 NOTE — PROGRESS NOTE ADULT - SUBJECTIVE AND OBJECTIVE BOX
Patient is a 67y old  Male who presents with a chief complaint of toe wound (13 Aug 2022 09:43)       INTERVAL HPI/OVERNIGHT EVENTS:  Patient seen and evaluated at bedside.  Pt is resting comfortable in NAD. Denies N/V/F/C.    Allergies    bananas (Rash)  Cauliflower (Other)  No Known Drug Allergies    Intolerances        Vital Signs Last 24 Hrs  T(C): 36.4 (14 Aug 2022 08:15), Max: 37 (13 Aug 2022 10:25)  T(F): 97.5 (14 Aug 2022 08:15), Max: 98.6 (13 Aug 2022 10:25)  HR: 57 (14 Aug 2022 08:15) (57 - 70)  BP: 148/69 (14 Aug 2022 08:15) (141/66 - 156/60)  BP(mean): --  RR: 16 (14 Aug 2022 08:15) (16 - 20)  SpO2: 100% (14 Aug 2022 08:15) (99% - 100%)    Parameters below as of 14 Aug 2022 08:15  Patient On (Oxygen Delivery Method): room air        LABS:                        11.7   7.32  )-----------( 220      ( 13 Aug 2022 01:46 )             37.1     08-13    140  |  105  |  22  ----------------------------<  168<H>  3.6   |  23  |  0.90    Ca    9.5      13 Aug 2022 01:46  Phos  3.7     08-13  Mg     2.10     08-13          CAPILLARY BLOOD GLUCOSE      POCT Blood Glucose.: 137 mg/dL (14 Aug 2022 08:45)  POCT Blood Glucose.: 149 mg/dL (13 Aug 2022 21:26)  POCT Blood Glucose.: 113 mg/dL (13 Aug 2022 17:40)  POCT Blood Glucose.: 192 mg/dL (13 Aug 2022 12:46)      Lower Extremity Physical Exam:  Vasular: DP/PT 2/4, B/L, CFT < 3 seconds B/L, Temperature gradient warm to cool, B/L.   Neuro: Epicritic sensation absent to the level of the digits, B/L.  Musculoskeletal/Ortho: s/p L foot partial fifth ray resection, healed  Skin:   - s/p R foot partial hallux amputation (DOS 8/12/22), low concern for residual soft tissue or bone infection, low concern for viability.   - Skin: Sutures intact, incision dehiscing centrally, mild sanguinous drainage expressed, mild teresa-wound erythema within normal post-op limits, no pus noted, skin flap warm, lateral distal corner presents reduced cap refill.  - Left foot no wounds, no clinical signs of infection.     RADIOLOGY & ADDITIONAL TESTS:

## 2022-08-15 ENCOUNTER — TRANSCRIPTION ENCOUNTER (OUTPATIENT)
Age: 67
End: 2022-08-15

## 2022-08-15 VITALS
RESPIRATION RATE: 17 BRPM | TEMPERATURE: 98 F | DIASTOLIC BLOOD PRESSURE: 63 MMHG | HEART RATE: 63 BPM | OXYGEN SATURATION: 100 % | SYSTOLIC BLOOD PRESSURE: 130 MMHG

## 2022-08-15 LAB
GLUCOSE BLDC GLUCOMTR-MCNC: 191 MG/DL — HIGH (ref 70–99)
GLUCOSE BLDC GLUCOMTR-MCNC: 205 MG/DL — HIGH (ref 70–99)

## 2022-08-15 PROCEDURE — 99239 HOSP IP/OBS DSCHRG MGMT >30: CPT

## 2022-08-15 RX ORDER — LOSARTAN POTASSIUM 100 MG/1
1 TABLET, FILM COATED ORAL
Qty: 30 | Refills: 0
Start: 2022-08-15 | End: 2022-09-13

## 2022-08-15 RX ORDER — ATORVASTATIN CALCIUM 80 MG/1
1 TABLET, FILM COATED ORAL
Qty: 30 | Refills: 0
Start: 2022-08-15 | End: 2022-09-13

## 2022-08-15 RX ORDER — INSULIN DETEMIR 100/ML (3)
25 INSULIN PEN (ML) SUBCUTANEOUS
Qty: 5 | Refills: 0
Start: 2022-08-15 | End: 2022-09-13

## 2022-08-15 RX ADMIN — LOSARTAN POTASSIUM 25 MILLIGRAM(S): 100 TABLET, FILM COATED ORAL at 05:05

## 2022-08-15 RX ADMIN — Medication 2: at 09:08

## 2022-08-15 RX ADMIN — Medication 2 UNIT(S): at 09:08

## 2022-08-15 RX ADMIN — Medication 1: at 13:11

## 2022-08-15 RX ADMIN — Medication 2 UNIT(S): at 13:11

## 2022-08-15 RX ADMIN — Medication 10 UNIT(S): at 09:04

## 2022-08-15 NOTE — PROGRESS NOTE ADULT - ASSESSMENT
67y Male s/p R foot partial hallux amputation POD 2  - Patient seen and evaluated.  - Afebrile, WBC 7.32.  - Skin: Sutures intact, incision dehiscing centrally, mild sanguinous drainage expressed, mild teresa-wound erythema within normal post-op limits, no pus noted, skin flap warm, lateral distal corner presents reduced cap refill.  - Left foot no wounds, no clinical signs of infection.   - Pt is non-compliant with weight bearing as tolerated with weight distributed to the right heel with a surgical shoe (he was walking without a surgical shoe this morning) so weightbearing status is now changing to non-weightbearing with crutches and a CAM boot to keep the right foot offloaded and allow adequate incision site healing.   - Central dehiscing site reinforced with one sterile steri strip, dressed with Betadine and 4X4 gauze, Rosina.   - s/p R foot partial hallux amputation (DOS 8/12/22), low concern for residual soft tissue or bone infection, low concern for viability.   - Clean Bone Culture: No growth (preliminary).   - Pod standpoint stable for discharge now that the clean bone margin has no growth for 2 days since surgery.  - Keep dressing Clean Dry and Intact until follow up in the clinic/office, follow up info in the discharge provider note.   - Discussed with attending.    67y Male s/p R foot partial hallux amputation POD 2  - Patient seen and evaluated.  - Afebrile, WBC 7.32.  - Skin: Sutures intact, incision dehiscing centrally, mild sanguinous drainage expressed, mild teresa-wound erythema within normal post-op limits, no pus noted, skin flap warm, lateral distal corner presents reduced cap refill.  - Left foot no wounds, no clinical signs of infection.   - Pt is non-compliant with weight bearing as tolerated with weight distributed to the right heel with a surgical shoe (he was walking without a surgical shoe this morning) so weightbearing status is now changing to non-weightbearing with crutches and a CAM boot to keep the right foot offloaded and allow adequate incision site healing.   - Central dehiscing site reinforced with one sterile steri strip, dressed with Betadine and 4X4 gauze, Rosina.   - s/p R foot partial hallux amputation (DOS 8/12/22), low concern for residual soft tissue or bone infection, low concern for viability.   - Clean Bone Culture: No growth (preliminary).   - Pod standpoint stable for discharge now that the clean bone margin has no growth for 2 days since surgery.  - Keep dressing Clean Dry and Intact until follow up in the clinic/office, follow up info in the discharge provider note.   - d/c with 1 wk of Augmentin 500mg BID   - Discussed with attending.

## 2022-08-15 NOTE — PROGRESS NOTE ADULT - PROBLEM SELECTOR PLAN 1
Infected diabetic ulcer of great toe with x-ray concerning for OM, recent cx w/ MSSA, Proteus, Enterococcus all sensitive to zosyn   - MRI 8/10, confirms OM  - podiatry following   - s/p vanco, c/w zosyn (8/8-8/13), given s/p OR w/ low concern for residual infection will dc abx  - MRSA screen neg  - wound culture: staph, not MRSA   - pain control  - s/p R foot partial first ray resection on 8/12, prelim bone margin NG (not yet 48 hours exactly)  - bigeminy post op on monitor, no sx, cannot BB given low HR at baseline  - per pod now now NWB in RLE  -dc planning, discussed with SW need to confirm if meds are covered by insurance Infected diabetic ulcer of great toe with x-ray concerning for OM, recent cx w/ MSSA, Proteus, Enterococcus all sensitive to zosyn   - MRI 8/10, confirms OM  - podiatry following   - s/p vanco, c/w zosyn (8/8-8/13), given s/p OR w/ low concern for residual infection will dc abx  - MRSA screen neg  - wound culture: staph, not MRSA   - pain control  - s/p R foot partial first ray resection on 8/12, prelim bone margin NG (not yet 48 hours exactly)  - bigeminy post op on monitor, no sx, cannot BB given low HR at baseline  - per pod now now NWB in RLE  -dc planning, discussed with SW need to confirm if meds are covered by insurance  - meds are covered by insurance and pt can be dc  - dc today, dc planning time spent in coordination 45 mts ( preparing dc summary and plan, discussion with CM, SW, ACP)

## 2022-08-15 NOTE — DISCHARGE NOTE NURSING/CASE MANAGEMENT/SOCIAL WORK - NSDCFUADDAPPT_GEN_ALL_CORE_FT
Podiatry Discharge Instructions:  Follow up: Please follow up with Dr. Dowling within 1 week of discharge from the hospital, please call [phone] for appointment and discuss that you recently were seen in the hospital.  Wound Care: Please leave your dressing clean dry intact until your follow up appointment  Weight bearing: Please keep right foot non-weightbearing and ALWAYS in a CAM walker.  Antibiotics: Please continue as instructed.

## 2022-08-15 NOTE — PROGRESS NOTE ADULT - PROBLEM SELECTOR PROBLEM 1
Diabetic ulcer of toe

## 2022-08-15 NOTE — PHYSICAL THERAPY INITIAL EVALUATION ADULT - WEIGHT-BEARING RESTRICTIONS: GAIT, REHAB EVAL
Right LE; however pt does not adhere to weightbearing restriction; pt required verbal cues throughout ambulation to not put any weight through right LE; pt prefers to place weight through right heel/nonweight-bearing

## 2022-08-15 NOTE — PHYSICAL THERAPY INITIAL EVALUATION ADULT - RANGE OF MOTION EXAMINATION, REHAB EVAL
bilateral upper extremity ROM was WFL (within functional limits)/bilateral lower extremity ROM was WFL (within functional limits)
bilateral lower extremity ROM was WFL (within functional limits)

## 2022-08-15 NOTE — PROGRESS NOTE ADULT - SUBJECTIVE AND OBJECTIVE BOX
Patient is a 67y old  Male who presents with a chief complaint of toe wound (14 Aug 2022 10:16)       INTERVAL HPI/OVERNIGHT EVENTS:  Patient seen and evaluated at bedside.  Pt is resting comfortable in NAD. Denies N/V/F/C.    Allergies    bananas (Rash)  Cauliflower (Other)  No Known Drug Allergies    Intolerances        Vital Signs Last 24 Hrs  T(C): 36.6 (15 Aug 2022 05:00), Max: 36.6 (14 Aug 2022 16:24)  T(F): 97.8 (15 Aug 2022 05:00), Max: 97.9 (14 Aug 2022 20:30)  HR: 75 (15 Aug 2022 05:00) (57 - 75)  BP: 145/82 (15 Aug 2022 05:00) (141/62 - 150/68)  BP(mean): --  RR: 18 (15 Aug 2022 05:00) (16 - 18)  SpO2: 100% (15 Aug 2022 05:00) (99% - 100%)    Parameters below as of 15 Aug 2022 05:00  Patient On (Oxygen Delivery Method): room air        LABS:      pending        CAPILLARY BLOOD GLUCOSE      POCT Blood Glucose.: 189 mg/dL (14 Aug 2022 22:00)  POCT Blood Glucose.: 146 mg/dL (14 Aug 2022 17:31)  POCT Blood Glucose.: 218 mg/dL (14 Aug 2022 12:42)  POCT Blood Glucose.: 137 mg/dL (14 Aug 2022 08:45)      Lower Extremity Physical Exam:    Vasular: DP/PT 2/4, B/L, CFT < 3 seconds B/L, Temperature gradient warm to cool, B/L.   Neuro: Epicritic sensation absent to the level of the digits, B/L.  Musculoskeletal/Ortho: s/p L foot partial fifth ray resection, healed  Skin:   - s/p R foot partial hallux amputation (DOS 8/12/22), low concern for residual soft tissue or bone infection, low concern for viability.   - Skin: Sutures intact, incision dehiscing centrally, mild sanguinous drainage expressed, mild teresa-wound erythema within normal post-op limits, no pus noted, skin flap warm, lateral distal corner presents reduced cap refill.  - Left foot no wounds, no clinical signs of infection.       RADIOLOGY & ADDITIONAL TESTS:

## 2022-08-15 NOTE — PROGRESS NOTE ADULT - PROBLEM SELECTOR PLAN 5
[facility]  dispo home   Pt wants CM/SW to assess insurance and also needs transport or 24 hours prior to let cousin know to get him  Plan discussed with ACP

## 2022-08-15 NOTE — PROGRESS NOTE ADULT - PROBLEM SELECTOR PLAN 2
Cr 0.97 now Cr noted to be 1.69, presumed pre-renal  - downtrending   - monitor, renally dose meds, avoid nephrotoxins

## 2022-08-15 NOTE — PHYSICAL THERAPY INITIAL EVALUATION ADULT - GAIT PATTERN USED, PT EVAL
Difficulty maintaining WB orders (WBAT to R heel in surgical boot), otherwise no issues ambulating/3-point gait
3-point gait

## 2022-08-15 NOTE — PROGRESS NOTE ADULT - PROVIDER SPECIALTY LIST ADULT
Podiatry
Hospitalist
Podiatry
Podiatry
Hospitalist

## 2022-08-15 NOTE — PROGRESS NOTE ADULT - PROBLEM SELECTOR PROBLEM 2
IDA (acute kidney injury)

## 2022-08-15 NOTE — PHYSICAL THERAPY INITIAL EVALUATION ADULT - DIAGNOSIS, PT EVAL
Pt s/p Right foot partial hallux amputation on 08/12/2022; pt presents with decreased balance
difficulty bearing weight  RLE

## 2022-08-15 NOTE — PHYSICAL THERAPY INITIAL EVALUATION ADULT - ACTIVE RANGE OF MOTION EXAMINATION, REHAB EVAL
left shoulder flexion 85 degrees, left elbow/hand/wrist WFL/Right UE Active ROM was WFL (within functional limits)/bilateral  lower extremity Active ROM was WFL (within functional limits)

## 2022-08-15 NOTE — PROGRESS NOTE ADULT - PROBLEM SELECTOR PLAN 3
poorly controlled w/ Hb A1c 9.2%, oddly on Levemir 15 units in AM, 20 in PM  - c/w 10 am and 15 units in pm  - c/w 2 units w/ meals   - c/w DM diet, BUD  - RD consult  - started on statin ()  - BP above goal, losartan 25 mg

## 2022-08-15 NOTE — PHYSICAL THERAPY INITIAL EVALUATION ADULT - GENERAL OBSERVATIONS, REHAB EVAL
Pt encountered in semisupine position, no distress, AxOX4, with +IV, and right foot wrapped in dressing dry/intact in surgical shoe. Pt agreeable to participate in PT evaluation.
Pt received standing up all lines intact

## 2022-08-15 NOTE — CHART NOTE - NSCHARTNOTEFT_GEN_A_CORE
NUTRITION FOLLOW-UP    Pt seen for Nutrition Consult for Assessment and Education. Pt was seen, assessed and given a nutrition education on his diet on 8/11/22.     Pt has a history of DM2. He reports his appetite and po intake have been good. He states he follows a consistent carb diet at home and did not need a diet instruction. He did request a written copy of the diet which was provided. Review diet with Pt.     Weight: Stated weight is 74.8 kg/165 lb. Weights in flow sheets are very inconsistent - 58kg/127.8 lb (8/9/22)  84.8 kg/186.9 lb (8/12/22)    Labs: POCT-205, 189, 146, 218, A1c-9.2    Current Diet: Diet, Regular:   Consistent Carbohydrate {Evening Snack} (CSTCHOSN)  Supplement Feeding Modality:  Oral  Glucerna Shake Cans or Servings Per Day:  2       Frequency:  Three Times a day (08-13-22 @ 08:37)    Edema: Edema not recorded in flow sheet    Skin: surgical incision right foot    Malnutrition Status: Ongoing severe malnutrition    RD to Remain Available: Tianna Bolanos MS, RDN, CDN pager [phone]     Additional Recommendations:   1) Monitor weight, labs, po intake and skin integrity.

## 2022-08-15 NOTE — PROGRESS NOTE ADULT - SUBJECTIVE AND OBJECTIVE BOX
Patient is a 67y old  Male who presents with a chief complaint of toe wound (15 Aug 2022 06:36)      SUBJECTIVE / OVERNIGHT EVENTS: Pt seen and examined at 11:55am, no overnight events, is concerned about dc and getting his meds, no other new issues reported.    MEDICATIONS  (STANDING):  acetaminophen     Tablet .. 650 milliGRAM(s) Oral every 6 hours  atorvastatin 20 milliGRAM(s) Oral at bedtime  dextrose 50% Injectable 25 Gram(s) IV Push once  dextrose 50% Injectable 12.5 Gram(s) IV Push once  dextrose 50% Injectable 25 Gram(s) IV Push once  enoxaparin Injectable 40 milliGRAM(s) SubCutaneous every 24 hours  insulin detemir injectable (LEVEMIR) 15 Unit(s) SubCutaneous at bedtime  insulin detemir injectable (LEVEMIR) 10 Unit(s) SubCutaneous before breakfast  insulin lispro (ADMELOG) corrective regimen sliding scale   SubCutaneous three times a day before meals  insulin lispro (ADMELOG) corrective regimen sliding scale   SubCutaneous at bedtime  insulin lispro Injectable (ADMELOG) 2 Unit(s) SubCutaneous three times a day before meals  losartan 25 milliGRAM(s) Oral daily    MEDICATIONS  (PRN):  acetaminophen     Tablet .. 650 milliGRAM(s) Oral every 6 hours PRN Mild Pain (1 - 3)  dextrose Oral Gel 15 Gram(s) Oral once PRN Blood Glucose LESS THAN 70 milliGRAM(s)/deciliter  ibuprofen  Tablet. 600 milliGRAM(s) Oral every 8 hours PRN Moderate Pain (4 - 6), Severe Pain (7 - 10)      Vital Signs Last 24 Hrs  T(C): 36.6 (15 Aug 2022 08:15), Max: 36.6 (14 Aug 2022 16:24)  T(F): 97.8 (15 Aug 2022 08:15), Max: 97.9 (14 Aug 2022 20:30)  HR: 63 (15 Aug 2022 08:15) (61 - 75)  BP: 130/63 (15 Aug 2022 08:15) (130/63 - 150/68)  BP(mean): --  RR: 17 (15 Aug 2022 08:15) (17 - 18)  SpO2: 100% (15 Aug 2022 08:15) (99% - 100%)    Parameters below as of 15 Aug 2022 08:15  Patient On (Oxygen Delivery Method): room air      CAPILLARY BLOOD GLUCOSE      POCT Blood Glucose.: 191 mg/dL (15 Aug 2022 13:08)  POCT Blood Glucose.: 205 mg/dL (15 Aug 2022 09:00)  POCT Blood Glucose.: 189 mg/dL (14 Aug 2022 22:00)  POCT Blood Glucose.: 146 mg/dL (14 Aug 2022 17:31)    I&O's Summary    14 Aug 2022 07:01  -  15 Aug 2022 07:00  --------------------------------------------------------  IN: 720 mL / OUT: 2500 mL / NET: -1780 mL    15 Aug 2022 07:01  -  15 Aug 2022 13:12  --------------------------------------------------------  IN: 597 mL / OUT: 700 mL / NET: -103 mL        PHYSICAL EXAM:  GENERAL: NAD, well-developed  CHEST/LUNG: Clear to auscultation bilaterally; No wheeze  HEART: Regular rate and rhythm  ABDOMEN: Soft, Nontender, Nondistended  EXTREMITIES: no LE edema  PSYCH: Calm  NEUROLOGY: AAOx3  SKIN: R foot wrapped     LABS:                    RADIOLOGY & ADDITIONAL TESTS:    Imaging Personally Reviewed:    Consultant(s) Notes Reviewed:      Care Discussed with Consultants/Other Providers:

## 2022-08-15 NOTE — PROGRESS NOTE ADULT - NUTRITIONAL ASSESSMENT
This patient has been assessed with a concern for Malnutrition and has been determined to have a diagnosis/diagnoses of Severe protein-calorie malnutrition.    This patient is being managed with:   Diet Regular-  Consistent Carbohydrate {Evening Snack} (CSTCHOSN)  Supplement Feeding Modality:  Oral  Glucerna Shake Cans or Servings Per Day:  2       Frequency:  Three Times a day  Entered: Aug 13 2022  8:37AM

## 2022-08-15 NOTE — PHYSICAL THERAPY INITIAL EVALUATION ADULT - PERTINENT HX OF CURRENT PROBLEM, REHAB EVAL
67 year old M with PMH DM c/b peripheral neuropathy, HLD, left 5th toe amputation (2016) presenting for evaluation of R toe wound. Patient follows with Dr. Bean Dowling (podiatry). Presented to ED 7/29 and found to have OM of distal phalanx of R great toe. Was offered admission for IV abx and possible amputation, however declined as he needed to take care of his mother. Returns today for evaluation.
67 year old M with PMH DM c/b peripheral neuropathy, HLD, left 5th toe amputation (2016) presenting for evaluation of Right toe wound. Patient follows with Dr. Bean Dowling (podiatry). Presented to ED 7/29 and found to have OM of distal phalanx of Right great toe. Was offered admission for IV abx and possible amputation, however declined as he needed to take care of his mother. Returns today for evaluation.

## 2022-08-15 NOTE — CHART NOTE - NSCHARTNOTEFT_GEN_A_CORE
Order received from podiatry resident to fit patient with camboot to be used after surgery  Pt instructed to don and cary  Follow up if needed  Pt may need diabetic shoe and prosthetic filler once his incision is closed.      Stone GAMA  [phone]  [facility]

## 2022-08-15 NOTE — DISCHARGE NOTE NURSING/CASE MANAGEMENT/SOCIAL WORK - PATIENT PORTAL LINK FT
You can access the FollowMyHealth Patient Portal offered by [facility] by registering at the following website: [email or web address]. By joining [facility]’s FollowMyHealth portal, you will also be able to view your health information using other applications (apps) compatible with our system.

## 2022-08-15 NOTE — PHYSICAL THERAPY INITIAL EVALUATION ADULT - PATIENT PROFILE REVIEW, REHAB EVAL
yes
No Activity Order in the Computer; Spoke with NIKO Rios prior to PT evaluation--> Pt OK for PT consult/OOB activity/yes

## 2022-08-15 NOTE — PHYSICAL THERAPY INITIAL EVALUATION ADULT - MANUAL MUSCLE TESTING RESULTS, REHAB EVAL
upper and lower body grossly assessed 4/5/grossly assessed due to
Right UE 5/5, Left shoulder 3-/5, left elbow/hand/wrist 4-/5, Bilateral LE 4/5

## 2022-08-15 NOTE — PHYSICAL THERAPY INITIAL EVALUATION ADULT - ADDITIONAL COMMENTS
Pt lives in  an apartment with his brother and cousin and mother with elevator access.  Pt takes care of his mother.  No history of falls.  Pt uses DME from previous toe amputation.
Pt reports that he lives in an apartment with his mother, brother, and cousin, with no steps to negotiate; elevator inside. Prior to hospital admission pt was completely independent and used no assistive device with ambulation. Pt denies any recent falls.    Pt left comfortable seated @ edge of bed, NAD, all lines intact, all precautions maintained, with call bell in reach, and RN aware of PT evaluation.

## 2022-08-16 LAB
-  AMPICILLIN/SULBACTAM: SIGNIFICANT CHANGE UP
-  CEFAZOLIN: SIGNIFICANT CHANGE UP
-  CLINDAMYCIN: SIGNIFICANT CHANGE UP
-  ERYTHROMYCIN: SIGNIFICANT CHANGE UP
-  GENTAMICIN: SIGNIFICANT CHANGE UP
-  OXACILLIN: SIGNIFICANT CHANGE UP
-  PENICILLIN: SIGNIFICANT CHANGE UP
-  RIFAMPIN: SIGNIFICANT CHANGE UP
-  TETRACYCLINE: SIGNIFICANT CHANGE UP
-  TRIMETHOPRIM/SULFAMETHOXAZOLE: SIGNIFICANT CHANGE UP
-  VANCOMYCIN: SIGNIFICANT CHANGE UP
CULTURE RESULTS: SIGNIFICANT CHANGE UP
METHOD TYPE: SIGNIFICANT CHANGE UP
ORGANISM # SPEC MICROSCOPIC CNT: SIGNIFICANT CHANGE UP
ORGANISM # SPEC MICROSCOPIC CNT: SIGNIFICANT CHANGE UP
SPECIMEN SOURCE: SIGNIFICANT CHANGE UP

## 2022-08-18 LAB — SURGICAL PATHOLOGY STUDY: SIGNIFICANT CHANGE UP

## 2022-09-10 LAB
CULTURE RESULTS: SIGNIFICANT CHANGE UP
SPECIMEN SOURCE: SIGNIFICANT CHANGE UP

## 2022-12-01 NOTE — DIETITIAN INITIAL EVALUATION ADULT - PERSON TAUGHT/METHOD
Patient/Child
verbal instruction/written material/teach back - (Patient repeats in own words)/patient instructed

## 2023-05-02 NOTE — ASU PREOP CHECKLIST - HEIGHT IN INCHES
4 Stelara Counseling:  I discussed with the patient the risks of ustekinumab including but not limited to immunosuppression, malignancy, posterior leukoencephalopathy syndrome, and serious infections.  The patient understands that monitoring is required including a PPD at baseline and must alert us or the primary physician if symptoms of infection or other concerning signs are noted.

## 2023-08-01 NOTE — PROGRESS NOTE ADULT - ASSESSMENT
List Prescription Topical Steroids You Tried (Separate Each Name With A Comma):: Triamcinolone · Assessment		  65 yo M s/p partial 5th ray resection (DOS 4/19)  - POD #3  - flap is viable however the prognosis is guarded, grafts intact at this time  - Still a high concern for residual osteomyelitis, pt will likely need a PICC, plan discussed with ID  - Awaiting OR data for abx plan   - Will defer to ID for abx plan once pathology and cultures from OR completed  - discussed w/ attending · Assessment		  63 yo M s/p partial 5th ray resection (DOS 4/19)  - POD #3  - flap is viable however the prognosis is guarded, grafts intact at this time  - according to OR findings high concern for residual osteomyelitis  - OR path: bone margin no acute OM  - Will defer to ID for abx plan once pathology and cultures from OR completed  - discussed w/ attending

## (undated) DEVICE — NDL HYPO REGULAR BEVEL 25G X 1.5" (BLUE)

## (undated) DEVICE — DRAPE TOWEL BLUE 17" X 24"

## (undated) DEVICE — SYR LUER LOK 10CC

## (undated) DEVICE — FRAZIER SUCTION TIP 8FR

## (undated) DEVICE — ANESTHESIA CIRCUIT ADULT HMEF

## (undated) DEVICE — DRSG ACE BANDAGE 4" NS

## (undated) DEVICE — BLADE SURGICAL #15 CARBON

## (undated) DEVICE — DRAPE SURGICAL #1010

## (undated) DEVICE — TOURNIQUET ESMARK 6"

## (undated) DEVICE — LABELS BLANK W PEN

## (undated) DEVICE — CANISTER DISPOSABLE THIN WALL 3000CC

## (undated) DEVICE — PREP CHLORAPREP HI-LITE ORANGE 26ML

## (undated) DEVICE — SOL IRR BAG NS 0.9% 3000ML

## (undated) DEVICE — PACK LIJ BASIC ORTHO

## (undated) DEVICE — VENODYNE/SCD SLEEVE CALF MEDIUM

## (undated) DEVICE — WARMING BLANKET FULL ADULT

## (undated) DEVICE — ELCTR GROUNDING PAD ADULT COVIDIEN

## (undated) DEVICE — DRSG KLING 4"

## (undated) DEVICE — DRSG STOCKINETTE IMPERVIOUS XL

## (undated) DEVICE — TOURNIQUET CUFF 18" DUAL PORT DUAL BLADDER W PLC (BLACK)

## (undated) DEVICE — DRSG XEROFORM 1 X 8"

## (undated) DEVICE — DRSG STOCKINETTE TUBULAR 6"